# Patient Record
Sex: MALE | Race: WHITE | NOT HISPANIC OR LATINO | Employment: OTHER | URBAN - METROPOLITAN AREA
[De-identification: names, ages, dates, MRNs, and addresses within clinical notes are randomized per-mention and may not be internally consistent; named-entity substitution may affect disease eponyms.]

---

## 2018-11-06 ENCOUNTER — OFFICE VISIT (OUTPATIENT)
Dept: ORTHOPEDIC SURGERY | Facility: CLINIC | Age: 53
End: 2018-11-06

## 2018-11-06 VITALS — HEART RATE: 88 BPM | BODY MASS INDEX: 32.65 KG/M2 | HEIGHT: 69 IN | OXYGEN SATURATION: 98 % | WEIGHT: 220.46 LBS

## 2018-11-06 DIAGNOSIS — G56.31 NEUROPATHY OF RIGHT RADIAL NERVE: Primary | ICD-10-CM

## 2018-11-06 PROCEDURE — 99203 OFFICE O/P NEW LOW 30 MIN: CPT | Performed by: PHYSICIAN ASSISTANT

## 2018-11-06 RX ORDER — TESTOSTERONE CYPIONATE 200 MG/ML
INJECTION, SOLUTION INTRAMUSCULAR
COMMUNITY
Start: 2018-10-31 | End: 2022-04-12 | Stop reason: SDUPTHER

## 2018-11-06 RX ORDER — CLOPIDOGREL BISULFATE 75 MG/1
TABLET ORAL
COMMUNITY
Start: 2018-10-22 | End: 2022-04-12 | Stop reason: SDUPTHER

## 2018-11-06 RX ORDER — LISINOPRIL 20 MG/1
TABLET ORAL
COMMUNITY
Start: 2018-11-01 | End: 2022-05-23

## 2018-11-06 RX ORDER — ISOSORBIDE MONONITRATE 30 MG/1
TABLET, EXTENDED RELEASE ORAL
COMMUNITY
Start: 2018-10-22 | End: 2021-10-22

## 2018-11-06 RX ORDER — NITROGLYCERIN 0.4 MG/1
TABLET SUBLINGUAL
COMMUNITY
Start: 2018-10-23

## 2018-11-06 RX ORDER — GABAPENTIN 600 MG/1
600 TABLET ORAL 4 TIMES DAILY PRN
COMMUNITY
Start: 2018-10-25 | End: 2021-10-22 | Stop reason: SDUPTHER

## 2018-11-06 RX ORDER — LANCETS 33 GAUGE
EACH MISCELLANEOUS
COMMUNITY
Start: 2018-10-24

## 2018-11-06 RX ORDER — ATORVASTATIN CALCIUM 40 MG/1
80 TABLET, FILM COATED ORAL
COMMUNITY
Start: 2018-10-22 | End: 2022-04-12 | Stop reason: SDUPTHER

## 2018-11-06 NOTE — PROGRESS NOTES
[facility] [place] Orthopaedic Surgery Clinic Note    Subjective     Chief Complaint   Patient presents with   • Right Hand - Pain        HPI      Ibrahima Martinez is a 53 y.o. male.  Right-hand-dominant.  Patient presents to orthopedic clinic for evaluation of his right thumb.  He states 3 or 4 months ago he slept with his elbow extended and hanging off the edge of the bed.  After that he had difficulty achieving full range of motion the elbow but after a few days that resolved and then he noticed he was no longer able to extend his thumb.  He reports no history of injury or trauma.  He has no pain, numbness or tingling into the thumb or into any part of the upper extremity.  No reported fever, chills, night sweats or other constitutional symptoms.  Symptoms do affect his job as a  making it difficult for him to do any gripping, twisting or twisting maneuvers.        Past Medical History:   Diagnosis Date   • Diabetes (CMS/HCC)    • Heart disease    • Hypertension       Past Surgical History:   Procedure Laterality Date   • CARDIAC CATHETERIZATION     • CORONARY STENT PLACEMENT        Family History   Problem Relation Age of Onset   • Heart attack Father    • Hypertension Father      Social History     Social History   • Marital status:      Spouse name: N/A   • Number of children: N/A   • Years of education: N/A     Occupational History   • Not on file.     Social History Main Topics   • Smoking status: Current Every Day Smoker     Packs/day: 1.00     Types: Cigarettes   • Smokeless tobacco: Never Used   • Alcohol use No   • Drug use: No   • Sexual activity: Defer     Other Topics Concern   • Not on file     Social History Narrative   • No narrative on file      No current outpatient prescriptions on file prior to visit.     No current facility-administered medications on file prior to visit.       No Known Allergies     The following portions of the patient's history were reviewed and updated as appropriate:  "allergies, current medications, past family history, past medical history, past social history, past surgical history and problem list.    Review of Systems   Constitutional: Negative.    HENT: Negative.    Eyes: Negative.    Respiratory: Negative.    Cardiovascular: Negative.    Gastrointestinal: Negative.    Endocrine: Negative.    Genitourinary: Negative.    Musculoskeletal: Positive for arthralgias.   Skin: Negative.    Allergic/Immunologic: Negative.    Neurological: Negative.    Hematological: Negative.    Psychiatric/Behavioral: Negative.         Objective      Physical Exam  Pulse 88   Ht 174 cm (68.5\")   Wt 100 kg (220 lb 7.4 oz)   SpO2 98%   BMI 33.03 kg/m²     Body mass index is 33.03 kg/m².      GENERAL APPEARANCE: awake, alert & oriented x 3, in no acute distress and well developed, well nourished  PSYCH: normal mood and affect  LUNGS:  breathing nonlabored, no wheezing  EYES: sclera anicteric, pupils equal  CARDIOVASCULAR: palpable pulses dorsalis pedis, palpable posterior tibial bilaterally. Capillary refill less than 2 seconds  INTEGUMENTARY: skin intact, no clubbing, cyanosis  NEUROLOGIC:  Normal Sensation and reflexes           Ortho Exam  Peripheral Vascular   Bilateral Upper Extremity    No cyanotic nail beds    Pink nail beds and rapid capillary refill   Palpation    Radial Pulse - Bilaterally normal    Neurologic   Sensory: Light touch intact- Right and left hand    Left Upper Extremity    Left wrist extensors: 5/5    Left wrist flexors: 5/5    Left intrinsics: 5/5   Right Upper Extremity    Right wrist extensors: 4+/5    Right wrist flexors: 4+/5    Right intrinsics: 5/5 with exception of 0/5 EPB and EPL    Musculoskeletal   Left Elbow    Forearm supination: AROM - 90 degrees    Forearm pronation: AROM - 90 degrees   Right Elbow    Forearm supination: AROM - 90 degrees    Forearm pronation: AROM - 90 degrees     Inspection and Palpation   Right Wrist      Tenderness - none    Swelling - " none    Crepitus - none    Muscle tone - no atrophy   Left Wrist    Tenderness - none    Swelling - none    Crepitus - none    Muscle tone - no atrophy     ROJM:   Left Wrist    Flexion: AROM - 90 degrees    Extension: AROM - 90 degrees   Right Wrist    Flexion: AROM - 90 degrees    Extension: AROM - 90 degrees     Deformities, Malalignments, Discrepancies    None     Functional Testing   Right Wrist    Tinel's Sign negative    Phalen's Sign negative    Carpal Compression Test negative   Left Wrist    Tinel's Sign negative    Phalen's Sign negative    Carpal Compression Test negative       Strength and Tone    Right  strength: fair because of EPB and EPL not functioning    Left  strength: good      Imaging/Studies  Imaging Results (last 7 days)     Procedure Component Value Units Date/Time    XR Hand 3+ View Right [[id number]] Updated:  11/06/18 1416      Ordered plain film imaging of the right hand.  Images reviewed by Dr. Hernandez.  No acute bony abnormality, fracture or dislocation.  Joint spaces are preserved.  No abnormal soft tissue findings.  See chart for official report.    Assessment/Plan        ICD-10-CM ICD-9-CM   1. Neuropathy of right radial nerve G56.31 354.3       Orders Placed This Encounter   Procedures   • XR Hand 3+ View Right   • EMG & Nerve Conduction Test        -Discussion was had with patient regarding exam, imaging, assessment and treatment options.  -Based on exam today I thought it would be prudent proceed on with EMG/NCS.  -If that study is negative for radiculopathy, neuropathy/palsy recommend proceeding on with MRI.  -If it any time he notes any pain recommend over-the-counter pain medication as needed.  -At this time patient will follow-up after EMG/NCS completed.  -Question and concerns answered.    Medical Decision Making  Management Options : over-the-counter medicine  Data/Risk: radiology tests    Shirley Dick PA-C  11/06/18  3:05 PM         EMR  Dragon/Transcription disclaimer:  Much of this encounter note is an electronic transcription of spoken language to printed text. Electronic transcription of spoken language may permit erroneous, or at times, nonsensical words or phrases to be inadvertently transcribed. Although I have reviewed the note for such errors, some may still exist.

## 2018-11-20 ENCOUNTER — OFFICE VISIT (OUTPATIENT)
Dept: ORTHOPEDIC SURGERY | Facility: CLINIC | Age: 53
End: 2018-11-20

## 2018-11-20 VITALS — WEIGHT: 216.05 LBS | HEART RATE: 90 BPM | BODY MASS INDEX: 32 KG/M2 | HEIGHT: 69 IN | OXYGEN SATURATION: 99 %

## 2018-11-20 DIAGNOSIS — G56.31 RADIAL NERVE PALSY, RIGHT: Primary | ICD-10-CM

## 2018-11-20 DIAGNOSIS — G56.11 MEDIAN NERVE NEUROPATHY, RIGHT: ICD-10-CM

## 2018-11-20 PROCEDURE — 99213 OFFICE O/P EST LOW 20 MIN: CPT | Performed by: PHYSICIAN ASSISTANT

## 2018-11-20 RX ORDER — SITAGLIPTIN 50 MG/1
TABLET, FILM COATED ORAL
COMMUNITY
Start: 2018-11-15 | End: 2021-10-22

## 2018-11-20 NOTE — PROGRESS NOTES
"    [facility] [place] Orthopaedic Surgery Clinic Note    Subjective     CC: Follow-up (2 week f/u Neuropathy of right radial nerve after EMG)      CARMELO Martinez is a 53 y.o. male.  Patient returns for follow-up of his right thumb following EMG/NCS.  Patient reports no change in that he still cannot extend his right thumb.  He does have a little bit of improvement with a reduction.  Once again no history of injury or trauma that he can recall.  He denies pain numbness or tingling into the extremity.     He states that following the EMG/NCS testing that provider has artery referred him on to an orthopedic surgeon at , he does not recall his name but states he was told the provider does upper extremity surgeries.    ROS:    Constiutional:Pt denies fever, chills, nausea, or vomiting.  MSK:as above    Objective      Past Medical History  Past Medical History:   Diagnosis Date   • Diabetes (CMS/HCC)    • Heart disease    • Hypertension          Physical Exam  Pulse 90   Ht 174 cm (68.5\")   Wt 98 kg (216 lb 0.8 oz)   SpO2 99%   BMI 32.37 kg/m²     Body mass index is 32.37 kg/m².    Patient is well nourished and well developed.        Ortho Exam  Peripheral Vascular              Bilateral Upper Extremity                          No cyanotic nail beds                          Pink nail beds and rapid capillary refill              Palpation                          Radial Pulse - Bilaterally normal     Neurologic              Sensory: Light touch intact- Right and left hand                  Right Upper Extremity                          Right wrist extensors: 4+/5                          Right wrist flexors: 4+/5                          Right intrinsics: 5/5 with exception of 0/5 EPB and EPL, 3-/5 APB     Musculoskeletal              Left Elbow                          Forearm supination: AROM - 90 degrees                          Forearm pronation: AROM - 90 degrees              Right Elbow                          Forearm " supination: AROM - 90 degrees                          Forearm pronation: AROM - 90 degrees                 Inspection and Palpation              Right Wrist                               Tenderness - none                          Swelling - none                          Crepitus - none                          Muscle tone - positive thenar atrophy                ROJM:              Left Wrist                          Flexion: AROM - 90 degrees                          Extension: AROM - 90 degrees              Right Wrist                          Flexion: AROM - 90 degrees                          Extension: AROM - 90 degrees                 Deformities, Malalignments, Discrepancies                          None                 Functional Testing              Right Wrist                          Tinel's Sign negative                          Phalen's Sign negative                          Carpal Compression Test negative              Left Wrist                          Tinel's Sign negative                          Phalen's Sign negative                          Carpal Compression Test negative                            Strength and Tone                          Right  strength: weak because of EPB and EPL not functioning                          Left  strength: good    Imaging/Labs/EMG Reviewed:  Imaging Results (last 24 hours)     ** No results found for the last 24 hours. **      EMG/NCS performed at [facility] on 11/15/18 was reviewed.  Per report evidence of proximal right median neuropathy with neurogenic changes noted to APB and pronator teres.  According to the report radial and ulnar motor studies were normal and median, ulnar, radial sensory studies were normal.    Assessment:  1. Radial nerve palsy, right    2. Median nerve neuropathy, right        Plan:  1. Secondary to the findings of the exam showing radial nerve palsy and EMG/NCS study showing evidence of median nerve  neuropathy I recommended patient keeps his appointment with  for further evaluation and definitive treatment as indicated.  2. He may require an MRI but at this time we will have the provider at  to order the studies that they deem appropriate.  3. He'll follow-up with us on an as-needed basis.  4. Question and concerns answered.        Shirley Dick PA-C  11/20/18  11:44 AM

## 2018-12-07 ENCOUNTER — TRANSCRIBE ORDERS (OUTPATIENT)
Dept: ADMINISTRATIVE | Facility: HOSPITAL | Age: 53
End: 2018-12-07

## 2018-12-07 DIAGNOSIS — G56.10: Primary | ICD-10-CM

## 2018-12-11 ENCOUNTER — TRANSCRIBE ORDERS (OUTPATIENT)
Dept: ADMINISTRATIVE | Facility: HOSPITAL | Age: 53
End: 2018-12-11

## 2018-12-11 DIAGNOSIS — G56.10: Primary | ICD-10-CM

## 2018-12-18 ENCOUNTER — HOSPITAL ENCOUNTER (OUTPATIENT)
Dept: MRI IMAGING | Facility: HOSPITAL | Age: 53
Discharge: HOME | End: 2018-12-18
Attending: PSYCHIATRY & NEUROLOGY | Admitting: PSYCHIATRY & NEUROLOGY

## 2018-12-18 ENCOUNTER — HOSPITAL ENCOUNTER (OUTPATIENT)
Dept: GENERAL RADIOLOGY | Facility: HOSPITAL | Age: 53
Discharge: HOME | End: 2018-12-18

## 2018-12-18 DIAGNOSIS — G56.10: ICD-10-CM

## 2018-12-18 DIAGNOSIS — Z13.89 ENCOUNTER FOR IMAGING TO SCREEN FOR METAL PRIOR TO MAGNETIC RESONANCE IMAGING (MRI): ICD-10-CM

## 2018-12-18 PROCEDURE — 73221 MRI JOINT UPR EXTREM W/O DYE: CPT

## 2018-12-18 PROCEDURE — 70200 X-RAY EXAM OF EYE SOCKETS: CPT

## 2021-10-22 ENCOUNTER — OFFICE VISIT (OUTPATIENT)
Dept: NEUROSURGERY | Facility: CLINIC | Age: 56
End: 2021-10-22

## 2021-10-22 VITALS — HEIGHT: 69 IN | TEMPERATURE: 97.3 F | BODY MASS INDEX: 33.62 KG/M2 | WEIGHT: 227 LBS

## 2021-10-22 DIAGNOSIS — M54.16 LUMBAR RADICULOPATHY: Primary | ICD-10-CM

## 2021-10-22 DIAGNOSIS — M51.36 DDD (DEGENERATIVE DISC DISEASE), LUMBAR: ICD-10-CM

## 2021-10-22 PROCEDURE — 99204 OFFICE O/P NEW MOD 45 MIN: CPT | Performed by: NEUROLOGICAL SURGERY

## 2021-10-22 RX ORDER — GABAPENTIN 600 MG/1
600 TABLET ORAL 4 TIMES DAILY
Qty: 120 TABLET | Refills: 0 | Status: SHIPPED | OUTPATIENT
Start: 2021-10-22 | End: 2021-11-24 | Stop reason: SDUPTHER

## 2021-10-22 RX ORDER — METOPROLOL SUCCINATE 25 MG/1
25 TABLET, EXTENDED RELEASE ORAL DAILY
COMMUNITY
Start: 2021-07-28 | End: 2022-04-12 | Stop reason: SDUPTHER

## 2021-10-22 NOTE — PROGRESS NOTES
Patient: Ibrahima Martinez  : 1965    Primary Care Provider: Michael Ruiz MD    Requesting Provider: As above        History    Chief Complaint: Low back and right lower extremity pain.    History of Present Illness: Mr. Martinez is a 56-year-old unemployed  who since March of this year has had bothersome low back pain that extends into the right posterior lateral calf.  He has no left leg symptoms.  He denies any inciting or precipitating events.  He has done chiropractic.  He had some leftover gabapentin which he is taken in recent days and that has been somewhat helpful.  He had an epidural injection which helped for approximately 1.5 days.  He has no bowel or bladder dysfunction.  His symptoms are worse with virtually all activity including lying down.  He saw orthopedic spine doctors in [place] in the spring of this year.    Review of Systems   Constitutional: Negative for activity change, appetite change, chills, diaphoresis, fatigue, fever and unexpected weight change.   HENT: Positive for dental problem. Negative for congestion, drooling, ear discharge, ear pain, facial swelling, hearing loss, mouth sores, nosebleeds, postnasal drip, rhinorrhea, sinus pressure, sinus pain, sneezing, sore throat, tinnitus, trouble swallowing and voice change.    Eyes: Negative for photophobia, pain, discharge, redness, itching and visual disturbance.   Respiratory: Positive for wheezing. Negative for apnea, cough, choking, chest tightness, shortness of breath and stridor.    Cardiovascular: Negative for chest pain, palpitations and leg swelling.   Gastrointestinal: Negative for abdominal distention, abdominal pain, anal bleeding, blood in stool, constipation, diarrhea, nausea, rectal pain and vomiting.   Endocrine: Negative for cold intolerance, heat intolerance, polydipsia, polyphagia and polyuria.   Genitourinary: Negative for decreased urine volume, difficulty urinating, dysuria, enuresis, flank pain,  "frequency, genital sores, hematuria and urgency.   Musculoskeletal: Negative for arthralgias, back pain, gait problem, joint swelling, myalgias, neck pain and neck stiffness.   Skin: Negative for color change, pallor, rash and wound.   Allergic/Immunologic: Negative for environmental allergies, food allergies and immunocompromised state.   Neurological: Negative for dizziness, tremors, seizures, syncope, facial asymmetry, speech difficulty, weakness, light-headedness, numbness and headaches.   Hematological: Negative for adenopathy. Does not bruise/bleed easily.   Psychiatric/Behavioral: Negative for agitation, behavioral problems, confusion, decreased concentration, dysphoric mood, hallucinations, self-injury, sleep disturbance and suicidal ideas. The patient is not nervous/anxious and is not hyperactive.    All other systems reviewed and are negative.      The patient's past medical history, past surgical history, family history, and social history have been reviewed at length in the electronic medical record.    Physical Exam:   Temp 97.3 °F (36.3 °C)   Ht 174 cm (68.5\")   Wt 103 kg (227 lb)   BMI 34.01 kg/m²   CONSTITUTIONAL: Patient is well-nourished, pleasant and appears stated age.  CV: Heart regular rate and rhythm without murmur, rub, or gallop.  PULMONARY: Lungs are clear to ascultation.  MUSCULOSKELETAL:  Straight leg raising is negative.  Aydin's Sign is negative.  ROM in the low back is normal.  Tenderness in the back to palpation is not observed.  NEUROLOGICAL:  Orientation, memory, attention span, language function, and cognition have been examined and are intact.  Strength is intact in the lower extremities to direct testing.  Muscle tone is normal throughout.  Station and gait are normal.  Sensation is intact to light touch testing throughout.  Deep tendon reflexes are 2+ and symmetrical except the right ankle reflex which is absent.  Coordination is intact.      Medical Decision Making    Data " Review:   (All imaging studies were personally reviewed unless stated otherwise)  The patient did not bring his imaging studies.  The report for a lumbar MRI study dated 4/7/2021 suggested moderate spinal stenosis at L4-5 and a left paracentral disc extrusion at L5-S1 in addition to diffuse degenerative change.    Diagnosis:   Right lower extremity radiculopathy.    Treatment Options:   I have ordered his gabapentin, 600 mg 4 times a day.  He will follow-up with his MRI images for my review.  Further recommendations will then ensue.  He is not vaccinated and does not want to be a guinea pig.       Diagnosis Plan   1. Lumbar radiculopathy     2. DDD (degenerative disc disease), lumbar         Scribed for Peter Mireles MD by KATIE Whitten 10/22/2021 10:34 EDT      I, Dr. Mireles, personally performed the services described in the documentation, as scribed in my presence, and it is both accurate and complete.

## 2021-11-03 ENCOUNTER — OFFICE VISIT (OUTPATIENT)
Dept: NEUROSURGERY | Facility: CLINIC | Age: 56
End: 2021-11-03

## 2021-11-03 VITALS — TEMPERATURE: 96.9 F | WEIGHT: 232 LBS | BODY MASS INDEX: 34.36 KG/M2 | HEIGHT: 69 IN

## 2021-11-03 DIAGNOSIS — M51.36 DDD (DEGENERATIVE DISC DISEASE), LUMBAR: ICD-10-CM

## 2021-11-03 DIAGNOSIS — M51.16 LUMBAR DISC HERNIATION WITH RADICULOPATHY: Primary | ICD-10-CM

## 2021-11-03 PROCEDURE — 99213 OFFICE O/P EST LOW 20 MIN: CPT | Performed by: NEUROLOGICAL SURGERY

## 2021-11-03 NOTE — PROGRESS NOTES
Patient: Ibrahima Martinez  : 1965    Primary Care Provider: Michael Ruiz MD    Requesting Provider: As above        History    Chief Complaint: Low back and right lower extremity pain.    History of Present Illness: Mr. Martinez is a 56-year-old unemployed  who since March of this year has had bothersome low back pain that extends into the right posterior lateral calf.  He has no left leg symptoms.  He denies any inciting or precipitating events.  He has done chiropractic.  He had some leftover gabapentin which he is taken in recent days and that has been somewhat helpful.  He had an epidural injection which helped for approximately 1.5 days.  He has no bowel or bladder dysfunction.  His symptoms are worse with virtually all activity including lying down.  He saw orthopedic spine doctors in [place] in the spring of this year.  He has been taking gabapentin since his last visit and his symptoms are much improved.    Review of Systems   Constitutional: Negative for activity change, appetite change, chills, diaphoresis, fatigue, fever and unexpected weight change.   HENT: Positive for dental problem. Negative for congestion, drooling, ear discharge, ear pain, facial swelling, hearing loss, mouth sores, nosebleeds, postnasal drip, rhinorrhea, sinus pressure, sinus pain, sneezing, sore throat, tinnitus, trouble swallowing and voice change.    Eyes: Negative for photophobia, pain, discharge, redness, itching and visual disturbance.   Respiratory: Positive for wheezing. Negative for apnea, cough, choking, chest tightness, shortness of breath and stridor.    Cardiovascular: Negative for chest pain, palpitations and leg swelling.   Gastrointestinal: Negative for abdominal distention, abdominal pain, anal bleeding, blood in stool, constipation, diarrhea, nausea, rectal pain and vomiting.   Endocrine: Negative for cold intolerance, heat intolerance, polydipsia, polyphagia and polyuria.   Genitourinary: Negative  "for decreased urine volume, difficulty urinating, dysuria, enuresis, flank pain, frequency, genital sores, hematuria, penile discharge, penile pain, penile swelling, scrotal swelling, testicular pain and urgency.   Musculoskeletal: Negative for arthralgias, back pain, gait problem, joint swelling, myalgias, neck pain and neck stiffness.   Skin: Negative for color change, pallor, rash and wound.   Allergic/Immunologic: Negative for environmental allergies, food allergies and immunocompromised state.   Neurological: Negative for dizziness, tremors, seizures, syncope, facial asymmetry, speech difficulty, weakness, light-headedness, numbness and headaches.   Hematological: Negative for adenopathy. Does not bruise/bleed easily.   Psychiatric/Behavioral: Negative for agitation, behavioral problems, confusion, decreased concentration, dysphoric mood, hallucinations, self-injury, sleep disturbance and suicidal ideas. The patient is not nervous/anxious and is not hyperactive.    All other systems reviewed and are negative.      The patient's past medical history, past surgical history, family history, and social history have been reviewed at length in the electronic medical record.    Physical Exam:   Temp 96.9 °F (36.1 °C)   Ht 174 cm (68.5\")   Wt 105 kg (232 lb)   BMI 34.76 kg/m²   MUSCULOSKELETAL:  Straight leg raising is negative.  Aydin's Sign is negative.  Tenderness in the back to palpation is not observed.  NEUROLOGICAL:  Strength is intact in the lower extremities to direct testing.  Muscle tone is normal throughout.  Station and gait are normal.  Sensation is intact to light touch testing throughout.    Medical Decision Making    Data Review:   (All imaging studies were personally reviewed unless stated otherwise)  MRI of the lumbar spine dated 4/7/2021 demonstrates some diffuse degenerative disc disease and facet arthropathy.  There is central disc protrusion slightly more prominent rightward at L4-5 and " L5-S1.  There is ligamentous overgrowth and possibly a small synovial cyst on the right at L4-5 that narrows the recess.    Diagnosis:   1.  Lumbar radiculopathy probably emanating from the L4-5 and/or L5-S1 levels.  2.  Lumbar degenerative disc disease.  3.  Lumbar degenerative joint disease.    Treatment Options:   The patient will continue his gabapentin.  I would probably do that for several more months and then try and wean down on the medicine.  He will follow-up with his primary care provider.  If his symptoms progress then I will be happy to reevaluate him.  Should he require surgical intervention I would probably check some flexion-extension upright lateral lumbar spine x-rays prior to intervention to rule out instability.       Diagnosis Plan   1. Lumbar disc herniation with radiculopathy     2. DDD (degenerative disc disease), lumbar         Scribed for Peter Mireles MD by Soraya Andersen [facility] 11/3/2021 09:03 EDT      I, Dr. Mireles, personally performed the services described in the documentation, as scribed in my presence, and it is both accurate and complete.

## 2021-11-24 ENCOUNTER — TELEPHONE (OUTPATIENT)
Dept: NEUROSURGERY | Facility: CLINIC | Age: 56
End: 2021-11-24

## 2021-11-24 DIAGNOSIS — M54.16 LUMBAR RADICULOPATHY: ICD-10-CM

## 2021-11-24 RX ORDER — GABAPENTIN 600 MG/1
600 TABLET ORAL 4 TIMES DAILY
Qty: 120 TABLET | Refills: 0 | Status: SHIPPED | OUTPATIENT
Start: 2021-11-24 | End: 2021-12-20 | Stop reason: SDUPTHER

## 2021-11-24 NOTE — TELEPHONE ENCOUNTER
Caller: Ibrahima Martinez    Relationship: Self    Best call back number: [phone]  Requested Prescriptions:   Requested Prescriptions     Pending Prescriptions Disp Refills   • gabapentin (NEURONTIN) 600 MG tablet 120 tablet 0     Sig: Take 1 tablet by mouth 4 (Four) Times a Day.        Pharmacy where request should be sent:  [facility]    Additional details provided by patient: PATIENT STATES A PRESCRIPTION WAS NEVER RECEIVED     Does the patient have less than a 3 day supply:  [x] Yes  [] No    King AL Rep   11/24/21 11:59 EST     SENDING HIGH PRIORITY DUE TO PATIENT NOT HAVING ANY LEFT.    THANK YOU

## 2021-11-24 NOTE — TELEPHONE ENCOUNTER
Provider:  Araceli  Caller: Ibrahima  Time of call:   11:58a ([facility])  Phone #:  [phone]  Surgery:  NA  Last visit:   Office Visit with Peter Mireles MD (11/03/2021)  Next visit: LILIANA WHITMAN:       10/22/2021 Gabapentin 600MG 1965 120 30 JOSU[facility] LIV[facility]STON [facility] Pharmacist  Group  [place] [id number]    Reason for call:       Requested Prescriptions     Pending Prescriptions Disp Refills   • gabapentin (NEURONTIN) 600 MG tablet 120 tablet 0     Sig: Take 1 tablet by mouth 4 (Four) Times a Day.

## 2021-12-20 DIAGNOSIS — M54.16 LUMBAR RADICULOPATHY: ICD-10-CM

## 2021-12-20 RX ORDER — GABAPENTIN 600 MG/1
600 TABLET ORAL 4 TIMES DAILY
Qty: 120 TABLET | Refills: 1 | Status: SHIPPED | OUTPATIENT
Start: 2021-12-20 | End: 2022-03-02

## 2021-12-20 NOTE — TELEPHONE ENCOUNTER
Caller: Ibrahima Martinez    Relationship: Self    Best call back number:[phone]      Requested Prescriptions: gabapentin (NEURONTIN) 600 MG tablet       Pharmacy where request should be sent:[facility]    Additional details provided by patient:PT WAS ASKING IF HE NEEDS TO CALL EVERY MONTH TO HAVE THIS REFILLED OR CAN HE BE ON AUTOMATIC REFILL-PLEASE ADVISE THANK YOU    Does the patient have less than a 3 day supply:  [x] Yes  [] No    King Devi Rep   12/20/21 14:22 EST

## 2021-12-20 NOTE — TELEPHONE ENCOUNTER
Provider:  Chi  Caller:  Patient  Surgery:  NA  Surgery Date: NA   Last visit: Office Visit with Peter Mireles MD (11/03/2021)   Next visit: NA    Reason for call:      Please see encounter from Sabrina.   Gabapentin pending, please sign if appropriate.         Requested Prescriptions     Pending Prescriptions Disp Refills   • gabapentin (NEURONTIN) 600 MG tablet 120 tablet 0     Sig: Take 1 tablet by mouth 4 (Four) Times a Day.     ANTONETTE:    10/22/2021 Gabapentin 600MG 1965 120 30 [facility] Pharmacist  Group  [facility] 1  11/24/2021 Gabapentin 600MG 1965 120 30 LUDA VILLARREAL [facility] Pharmacist  Group  [facility] 1

## 2022-03-01 DIAGNOSIS — M54.16 LUMBAR RADICULOPATHY: ICD-10-CM

## 2022-03-01 NOTE — TELEPHONE ENCOUNTER
Provider:  Chi  Caller:  Automated refill request  Surgery:  NA  Surgery Date: NA    Last visit:  Office Visit with Peter Mireles MD (11/03/2021)  Next visit: NA    Reason for call:        Automated refill request for Gabapentin.      Requested Prescriptions     Pending Prescriptions Disp Refills   • gabapentin (NEURONTIN) 600 MG tablet [Pharmacy Med Name: GABAPENTIN 600MG] 120 tablet 0     Sig: TAKE 1 TABLET BY MOUTH FOUR TIMES DAILY     ANTONETTE:      12/22/2021 Gabapentin 600MG 1965 120 30 Elma Ferreira  PHARMACIST GROUP  [facility] 1  01/21/2022 Gabapentin 600MG 1965 120 30 Elma Ferreira [facility]  PHARMACIST GROUP  [facility] 1  01/31/2022 Testosterone Cypionate  200MG/ML/9.45MG/ML/0.2ML/  1965 10 70 Michael Ruiz  PHARMACIST GROUP  [facility] 1

## 2022-03-01 NOTE — TELEPHONE ENCOUNTER
We'd like patient to start weaning off of this. If he does not think he can do that then we would ask he see his PCP to continue management as he is no longer followed in our office. We can give him a couple refills until he can get into his PCP if needed

## 2022-03-02 RX ORDER — GABAPENTIN 600 MG/1
TABLET ORAL
Qty: 120 TABLET | Refills: 0 | Status: SHIPPED | OUTPATIENT
Start: 2022-03-02 | End: 2022-04-12 | Stop reason: SDUPTHER

## 2022-04-12 ENCOUNTER — OFFICE VISIT (OUTPATIENT)
Dept: FAMILY MEDICINE CLINIC | Facility: CLINIC | Age: 57
End: 2022-04-12

## 2022-04-12 VITALS
HEIGHT: 70 IN | DIASTOLIC BLOOD PRESSURE: 90 MMHG | OXYGEN SATURATION: 94 % | BODY MASS INDEX: 32.21 KG/M2 | SYSTOLIC BLOOD PRESSURE: 156 MMHG | WEIGHT: 225 LBS | HEART RATE: 86 BPM

## 2022-04-12 DIAGNOSIS — M54.31 NEURALGIA OF RIGHT SCIATIC NERVE: ICD-10-CM

## 2022-04-12 DIAGNOSIS — I10 PRIMARY HYPERTENSION: ICD-10-CM

## 2022-04-12 DIAGNOSIS — Z79.899 ENCOUNTER FOR LONG-TERM (CURRENT) USE OF OTHER MEDICATIONS: ICD-10-CM

## 2022-04-12 DIAGNOSIS — K08.9 POOR DENTITION: ICD-10-CM

## 2022-04-12 DIAGNOSIS — M54.16 LUMBAR RADICULOPATHY: ICD-10-CM

## 2022-04-12 DIAGNOSIS — I25.10 ATHEROSCLEROSIS OF CORONARY ARTERY OF NATIVE HEART WITHOUT ANGINA PECTORIS, UNSPECIFIED VESSEL OR LESION TYPE: Primary | ICD-10-CM

## 2022-04-12 DIAGNOSIS — M51.37 DEGENERATION OF LUMBOSACRAL INTERVERTEBRAL DISC: ICD-10-CM

## 2022-04-12 DIAGNOSIS — J43.9 PULMONARY EMPHYSEMA, UNSPECIFIED EMPHYSEMA TYPE: ICD-10-CM

## 2022-04-12 DIAGNOSIS — N18.9 CHRONIC KIDNEY DISEASE, UNSPECIFIED CKD STAGE: ICD-10-CM

## 2022-04-12 DIAGNOSIS — E29.1 MALE HYPOGONADISM: ICD-10-CM

## 2022-04-12 PROBLEM — G62.9 PERIPHERAL NEUROPATHY: Status: ACTIVE | Noted: 2022-03-23

## 2022-04-12 PROBLEM — K21.9 GASTROESOPHAGEAL REFLUX DISEASE: Status: ACTIVE | Noted: 2022-03-28

## 2022-04-12 PROBLEM — F11.90 OPIOID USE DISORDER: Status: ACTIVE | Noted: 2022-03-23

## 2022-04-12 PROBLEM — J44.9 CHRONIC OBSTRUCTIVE PULMONARY DISEASE (HCC): Status: ACTIVE | Noted: 2022-03-28

## 2022-04-12 PROBLEM — M51.379 DEGENERATION OF LUMBOSACRAL INTERVERTEBRAL DISC: Status: ACTIVE | Noted: 2022-04-12

## 2022-04-12 LAB
POC AMPHETAMINES: NEGATIVE
POC BARBITURATES: NEGATIVE
POC BENZODIAZEPHINES: NEGATIVE
POC COCAINE: NEGATIVE
POC METHADONE: POSITIVE
POC METHAMPHETAMINE SCREEN URINE: NEGATIVE
POC OPIATES: NEGATIVE
POC OXYCODONE: POSITIVE
POC PHENCYCLIDINE: NEGATIVE
POC PROPOXYPHENE: NEGATIVE
POC THC: NEGATIVE
POC TRICYCLIC ANTIDEPRESSANTS: NEGATIVE

## 2022-04-12 PROCEDURE — 80305 DRUG TEST PRSMV DIR OPT OBS: CPT | Performed by: FAMILY MEDICINE

## 2022-04-12 PROCEDURE — 99204 OFFICE O/P NEW MOD 45 MIN: CPT | Performed by: FAMILY MEDICINE

## 2022-04-12 RX ORDER — ATORVASTATIN CALCIUM 80 MG/1
80 TABLET, FILM COATED ORAL DAILY
COMMUNITY
End: 2022-05-23 | Stop reason: SDUPTHER

## 2022-04-12 RX ORDER — FUROSEMIDE 40 MG/1
TABLET ORAL
COMMUNITY
Start: 2022-04-03 | End: 2022-05-23

## 2022-04-12 RX ORDER — CLOPIDOGREL BISULFATE 75 MG/1
75 TABLET ORAL DAILY
Qty: 30 TABLET | Refills: 3 | Status: SHIPPED | OUTPATIENT
Start: 2022-04-12 | End: 2022-05-23 | Stop reason: SDUPTHER

## 2022-04-12 RX ORDER — GABAPENTIN 600 MG/1
600 TABLET ORAL 4 TIMES DAILY
Qty: 120 TABLET | Refills: 5 | Status: SHIPPED | OUTPATIENT
Start: 2022-04-12 | End: 2023-03-27 | Stop reason: SDUPTHER

## 2022-04-12 RX ORDER — TESTOSTERONE CYPIONATE 200 MG/ML
200 INJECTION, SOLUTION INTRAMUSCULAR
Qty: 2 ML | Refills: 2 | Status: SHIPPED | OUTPATIENT
Start: 2022-04-12 | End: 2022-05-23 | Stop reason: SDUPTHER

## 2022-04-12 RX ORDER — METHOCARBAMOL 500 MG/1
500 TABLET, FILM COATED ORAL 4 TIMES DAILY
COMMUNITY
Start: 2022-04-03 | End: 2022-04-12

## 2022-04-12 RX ORDER — OXYCODONE HYDROCHLORIDE 5 MG/1
TABLET ORAL
COMMUNITY
Start: 2022-04-03 | End: 2022-05-23

## 2022-04-12 RX ORDER — ATORVASTATIN CALCIUM 40 MG/1
80 TABLET, FILM COATED ORAL DAILY
Qty: 30 TABLET | Refills: 3 | Status: SHIPPED | OUTPATIENT
Start: 2022-04-12 | End: 2022-04-12

## 2022-04-12 RX ORDER — DOCUSATE SODIUM 100 MG/1
TABLET ORAL
COMMUNITY
Start: 2022-04-03

## 2022-04-12 RX ORDER — PSEUDOEPHED/ACETAMINOPH/DIPHEN 30MG-500MG
TABLET ORAL
COMMUNITY
Start: 2022-04-03

## 2022-04-12 NOTE — PROGRESS NOTES
New Patient Office Visit      Date of Visit:  2022   Patient Name: Ibrahima Martinez  : 1965   MRN: [id number]     Chief Complaint:  No chief complaint on file.      History of Present Illness: Ibrahima Martinez is a 56 y.o. male who is here today to establish care.  Patient comes in today for multiple reasons.  Somewhat of a hospital follow-up.  Patient with recent CABG.  Surgery done at .  Normal cardiologist is in [place].  Patient has multiple problems.  He has a history of hypertension, chronic kidney disease, chronic low back pain, COPD.  He also has a history of low testosterone.  Medications and medical problems reviewed.  Patient currently under the care of cardiology for his coronary artery disease.  Currently sees addiction management for his methadone refills.  Started on gabapentin by neurosurgery.  Did also need a refill on this medication.  Tico and UDS reviewed.  Symptoms controlled with current medication.  HPI      Subjective      Review of Systems:   Review of Systems   Constitutional: Negative for fatigue and fever.   HENT: Negative for congestion and ear pain.    Respiratory: Negative for apnea, cough, chest tightness and shortness of breath.    Cardiovascular: Negative for chest pain.   Gastrointestinal: Negative for abdominal pain, constipation, diarrhea and nausea.   Musculoskeletal: Negative for arthralgias.   Psychiatric/Behavioral: Negative for depressed mood and stress.       Past Medical History:   Past Medical History:   Diagnosis Date   • Back problem    • Diabetes (HCC)    • Heart disease    • Hypertension        Past Surgical History:   Past Surgical History:   Procedure Laterality Date   • CARDIAC CATHETERIZATION     • CORONARY STENT PLACEMENT     • EPIDURAL BLOCK      For right-sided sciatica pain.        Family History:   Family History   Problem Relation Age of Onset   • Heart attack Father    • Hypertension Father    • Heart disease Father    • Arthritis Mother         Social History:   Social History     Socioeconomic History   • Marital status:    Tobacco Use   • Smoking status: Former Smoker     Packs/day: 1.00     Years: 15.00     Pack years: 15.00     Types: Cigarettes     Quit date: 2022     Years since quittin.0   • Smokeless tobacco: Never Used   Vaping Use   • Vaping Use: Never used   Substance and Sexual Activity   • Alcohol use: No   • Drug use: No   • Sexual activity: Defer       Medications:     Current Outpatient Medications:   •  aspirin 81 MG tablet, Take  by mouth Daily., Disp: , Rfl:   •  atorvastatin (LIPITOR) 80 MG tablet, Take 80 mg by mouth Daily., Disp: , Rfl:   •  clopidogrel (PLAVIX) 75 MG tablet, Take 1 tablet by mouth Daily., Disp: 30 tablet, Rfl: 3  •  furosemide (LASIX) 40 MG tablet, , Disp: , Rfl:   •  gabapentin (NEURONTIN) 600 MG tablet, Take 1 tablet by mouth 4 (Four) Times a Day., Disp: 120 tablet, Rfl: 5  •  METHADONE HCL PO, Take 100 mg by mouth Every Morning., Disp: , Rfl:   •  mometasone-formoterol (DULERA 100) 100-5 MCG/ACT inhaler, Inhale 2 puffs 2 (Two) Times a Day., Disp: , Rfl:   •  nitroglycerin (NITROSTAT) 0.4 MG SL tablet, , Disp: , Rfl:   •  ONETOUCH DELICA LANCETS 33G misc, , Disp: , Rfl:   •  oxyCODONE (ROXICODONE) 5 MG immediate release tablet, , Disp: , Rfl:   •  Stool Softener 100 MG capsule, , Disp: , Rfl:   •  Testosterone Cypionate (DEPOTESTOTERONE CYPIONATE) 200 MG/ML injection, Inject 1 mL into the appropriate muscle as directed by prescriber Every 14 (Fourteen) Days., Disp: 2 mL, Rfl: 2  •  tiotropium bromide monohydrate (SPIRIVA RESPIMAT) 2.5 MCG/ACT aerosol solution inhaler, Inhale 2 puffs Daily., Disp: 1 each, Rfl: 5  •  Acetaminophen Extra Strength 500 MG tablet, , Disp: , Rfl:   •  lisinopril (PRINIVIL,ZESTRIL) 20 MG tablet, , Disp: , Rfl:   •  metFORMIN (GLUCOPHAGE) 1000 MG tablet, Take 1,000 mg by mouth 2 (Two) Times a Day With Meals., Disp: , Rfl:   •  metoprolol tartrate (LOPRESSOR) 25 MG  "tablet, Take 1.5 tablets by mouth 2 (Two) Times a Day., Disp: 90 tablet, Rfl: 3    Allergies:   No Known Allergies    Objective     Physical Exam:  Vital Signs:   Vitals:    04/12/22 1422   BP: 156/90   Pulse: 86   SpO2: 94%   Weight: 102 kg (225 lb)   Height: 177.8 cm (70\")     Body mass index is 32.28 kg/m².     Physical Exam  Vitals and nursing note reviewed.   Constitutional:       General: He is not in acute distress.     Appearance: Normal appearance. He is not ill-appearing.   HENT:      Head: Normocephalic and atraumatic.      Right Ear: Tympanic membrane and ear canal normal.      Left Ear: Tympanic membrane and ear canal normal.      Nose: Nose normal.   Cardiovascular:      Rate and Rhythm: Normal rate and regular rhythm.      Heart sounds: Normal heart sounds.   Pulmonary:      Effort: Pulmonary effort is normal.      Breath sounds: Normal breath sounds.   Neurological:      Mental Status: He is alert and oriented to person, place, and time. Mental status is at baseline.   Psychiatric:         Mood and Affect: Mood normal.       BMI is above normal parameters. Recommendations: exercise counseling/recommendations and nutrition counseling/recommendations        Assessment / Plan      Assessment/Plan:   Diagnoses and all orders for this visit:    1. Atherosclerosis of coronary artery of native heart without angina pectoris, unspecified vessel or lesion type (Primary)  -     CBC Auto Differential; Future  -     Comprehensive Metabolic Panel; Future  -     Lipid Panel; Future  -     CBC Auto Differential  -     Comprehensive Metabolic Panel  -     Lipid Panel    2. Primary hypertension  -     CBC Auto Differential; Future  -     Comprehensive Metabolic Panel; Future  -     Lipid Panel; Future  -     TSH; Future  -     CBC Auto Differential  -     Comprehensive Metabolic Panel  -     Lipid Panel  -     TSH    3. Chronic kidney disease, unspecified CKD stage  -     CBC Auto Differential; Future  -     " Comprehensive Metabolic Panel; Future  -     Lipid Panel; Future  -     CBC Auto Differential  -     Comprehensive Metabolic Panel  -     Lipid Panel    4. Neuralgia of right sciatic nerve  -     POC Urine Drug Screen, Triage    5. Degeneration of lumbosacral intervertebral disc    6. Pulmonary emphysema, unspecified emphysema type (HCC)    7. Male hypogonadism  -     Testosterone Cypionate (DEPOTESTOTERONE CYPIONATE) 200 MG/ML injection; Inject 1 mL into the appropriate muscle as directed by prescriber Every 14 (Fourteen) Days.  Dispense: 2 mL; Refill: 2  -     Testosterone; Future  -     Testosterone; Future  -     Testosterone    8. Lumbar radiculopathy  -     gabapentin (NEURONTIN) 600 MG tablet; Take 1 tablet by mouth 4 (Four) Times a Day.  Dispense: 120 tablet; Refill: 5    9. Encounter for long-term (current) use of other medications  -     POC Urine Drug Screen, Triage    10. Poor dentition  -     Ambulatory Referral to Dentistry    Other orders  -     clopidogrel (PLAVIX) 75 MG tablet; Take 1 tablet by mouth Daily.  Dispense: 30 tablet; Refill: 3  -     tiotropium bromide monohydrate (SPIRIVA RESPIMAT) 2.5 MCG/ACT aerosol solution inhaler; Inhale 2 puffs Daily.  Dispense: 1 each; Refill: 5  -     Discontinue: atorvastatin (LIPITOR) 40 MG tablet; Take 2 tablets by mouth Daily.  Dispense: 30 tablet; Refill: 3  -     metoprolol tartrate (LOPRESSOR) 25 MG tablet; Take 1.5 tablets by mouth 2 (Two) Times a Day.  Dispense: 90 tablet; Refill: 3         Medication refills given today.  Labs done to reevaluate situation after his surgery.  I want to see him back in 6 weeks for reevaluation of his testosterone.  We will check labs again at that point.  Patient also requested a referral to  dentistry for evaluation.  Poor dentition.    Follow Up:   Return in about 6 weeks (around 5/24/2022) for Next scheduled follow up.    Chavo Duenas  [facility] [place]

## 2022-04-13 ENCOUNTER — TELEPHONE (OUTPATIENT)
Dept: FAMILY MEDICINE CLINIC | Facility: CLINIC | Age: 57
End: 2022-04-13

## 2022-04-13 LAB
ALBUMIN SERPL-MCNC: 3.9 G/DL (ref 3.8–4.9)
ALBUMIN/GLOB SERPL: 1.3 {RATIO} (ref 1.2–2.2)
ALP SERPL-CCNC: 122 IU/L (ref 44–121)
ALT SERPL-CCNC: 19 IU/L (ref 0–44)
AST SERPL-CCNC: 18 IU/L (ref 0–40)
BASOPHILS # BLD AUTO: 0.1 X10E3/UL (ref 0–0.2)
BASOPHILS NFR BLD AUTO: 1 %
BILIRUB SERPL-MCNC: 0.5 MG/DL (ref 0–1.2)
BUN SERPL-MCNC: 34 MG/DL (ref 6–24)
BUN/CREAT SERPL: 17 (ref 9–20)
CALCIUM SERPL-MCNC: 9.1 MG/DL (ref 8.7–10.2)
CHLORIDE SERPL-SCNC: 102 MMOL/L (ref 96–106)
CHOLEST SERPL-MCNC: 98 MG/DL (ref 100–199)
CO2 SERPL-SCNC: 22 MMOL/L (ref 20–29)
CREAT SERPL-MCNC: 2.01 MG/DL (ref 0.76–1.27)
EGFRCR SERPLBLD CKD-EPI 2021: 38 ML/MIN/1.73
EOSINOPHIL # BLD AUTO: 0.3 X10E3/UL (ref 0–0.4)
EOSINOPHIL NFR BLD AUTO: 3 %
ERYTHROCYTE [DISTWIDTH] IN BLOOD BY AUTOMATED COUNT: 13.6 % (ref 11.6–15.4)
GLOBULIN SER CALC-MCNC: 2.9 G/DL (ref 1.5–4.5)
GLUCOSE SERPL-MCNC: 154 MG/DL (ref 65–99)
HCT VFR BLD AUTO: 31.8 % (ref 37.5–51)
HDLC SERPL-MCNC: 44 MG/DL
HGB BLD-MCNC: 10.4 G/DL (ref 13–17.7)
IMM GRANULOCYTES # BLD AUTO: 0.1 X10E3/UL (ref 0–0.1)
IMM GRANULOCYTES NFR BLD AUTO: 1 %
LDLC SERPL CALC-MCNC: 35 MG/DL (ref 0–99)
LYMPHOCYTES # BLD AUTO: 1 X10E3/UL (ref 0.7–3.1)
LYMPHOCYTES NFR BLD AUTO: 10 %
MCH RBC QN AUTO: 30.1 PG (ref 26.6–33)
MCHC RBC AUTO-ENTMCNC: 32.7 G/DL (ref 31.5–35.7)
MCV RBC AUTO: 92 FL (ref 79–97)
MONOCYTES # BLD AUTO: 0.8 X10E3/UL (ref 0.1–0.9)
MONOCYTES NFR BLD AUTO: 8 %
NEUTROPHILS # BLD AUTO: 8.3 X10E3/UL (ref 1.4–7)
NEUTROPHILS NFR BLD AUTO: 77 %
PLATELET # BLD AUTO: 423 X10E3/UL (ref 150–450)
POTASSIUM SERPL-SCNC: 4.9 MMOL/L (ref 3.5–5.2)
PROT SERPL-MCNC: 6.8 G/DL (ref 6–8.5)
RBC # BLD AUTO: 3.45 X10E6/UL (ref 4.14–5.8)
SODIUM SERPL-SCNC: 141 MMOL/L (ref 134–144)
TESTOST SERPL-MCNC: 42 NG/DL (ref 264–916)
TRIGL SERPL-MCNC: 102 MG/DL (ref 0–149)
TSH SERPL DL<=0.005 MIU/L-ACNC: 1.22 UIU/ML (ref 0.45–4.5)
VLDLC SERPL CALC-MCNC: 19 MG/DL (ref 5–40)
WBC # BLD AUTO: 10.5 X10E3/UL (ref 3.4–10.8)

## 2022-04-14 ENCOUNTER — TELEPHONE (OUTPATIENT)
Dept: FAMILY MEDICINE CLINIC | Facility: CLINIC | Age: 57
End: 2022-04-14

## 2022-04-14 NOTE — TELEPHONE ENCOUNTER
Pt called stating at his last appt it had been discussed that Dr. Duenas would change up his testosterone med. Pt states when he got to the pharmacy he picked up the same med that he has been taking.

## 2022-05-16 ENCOUNTER — LAB (OUTPATIENT)
Dept: FAMILY MEDICINE CLINIC | Facility: CLINIC | Age: 57
End: 2022-05-16

## 2022-05-16 DIAGNOSIS — I25.10 ATHEROSCLEROSIS OF CORONARY ARTERY OF NATIVE HEART WITHOUT ANGINA PECTORIS, UNSPECIFIED VESSEL OR LESION TYPE: Primary | ICD-10-CM

## 2022-05-16 DIAGNOSIS — I25.10 ATHEROSCLEROSIS OF CORONARY ARTERY OF NATIVE HEART WITHOUT ANGINA PECTORIS, UNSPECIFIED VESSEL OR LESION TYPE: ICD-10-CM

## 2022-05-16 DIAGNOSIS — E29.1 MALE HYPOGONADISM: ICD-10-CM

## 2022-05-16 PROCEDURE — 36415 COLL VENOUS BLD VENIPUNCTURE: CPT | Performed by: FAMILY MEDICINE

## 2022-05-17 LAB
ALBUMIN SERPL-MCNC: 4.1 G/DL (ref 3.8–4.9)
ALBUMIN/GLOB SERPL: 1.4 {RATIO} (ref 1.2–2.2)
ALP SERPL-CCNC: 139 IU/L (ref 44–121)
ALT SERPL-CCNC: 13 IU/L (ref 0–44)
AST SERPL-CCNC: 11 IU/L (ref 0–40)
BASOPHILS # BLD AUTO: 0.1 X10E3/UL (ref 0–0.2)
BASOPHILS NFR BLD AUTO: 1 %
BILIRUB SERPL-MCNC: 0.3 MG/DL (ref 0–1.2)
BUN SERPL-MCNC: 22 MG/DL (ref 6–24)
BUN/CREAT SERPL: 12 (ref 9–20)
CALCIUM SERPL-MCNC: 8.9 MG/DL (ref 8.7–10.2)
CHLORIDE SERPL-SCNC: 107 MMOL/L (ref 96–106)
CO2 SERPL-SCNC: 20 MMOL/L (ref 20–29)
CREAT SERPL-MCNC: 1.8 MG/DL (ref 0.76–1.27)
EGFRCR SERPLBLD CKD-EPI 2021: 43 ML/MIN/1.73
EOSINOPHIL # BLD AUTO: 0.5 X10E3/UL (ref 0–0.4)
EOSINOPHIL NFR BLD AUTO: 6 %
ERYTHROCYTE [DISTWIDTH] IN BLOOD BY AUTOMATED COUNT: 13.9 % (ref 11.6–15.4)
GLOBULIN SER CALC-MCNC: 2.9 G/DL (ref 1.5–4.5)
GLUCOSE SERPL-MCNC: 167 MG/DL (ref 65–99)
HCT VFR BLD AUTO: 39.8 % (ref 37.5–51)
HGB BLD-MCNC: 12.6 G/DL (ref 13–17.7)
IMM GRANULOCYTES # BLD AUTO: 0 X10E3/UL (ref 0–0.1)
IMM GRANULOCYTES NFR BLD AUTO: 0 %
LYMPHOCYTES # BLD AUTO: 1.5 X10E3/UL (ref 0.7–3.1)
LYMPHOCYTES NFR BLD AUTO: 17 %
MCH RBC QN AUTO: 28.6 PG (ref 26.6–33)
MCHC RBC AUTO-ENTMCNC: 31.7 G/DL (ref 31.5–35.7)
MCV RBC AUTO: 90 FL (ref 79–97)
MONOCYTES # BLD AUTO: 0.8 X10E3/UL (ref 0.1–0.9)
MONOCYTES NFR BLD AUTO: 9 %
NEUTROPHILS # BLD AUTO: 5.8 X10E3/UL (ref 1.4–7)
NEUTROPHILS NFR BLD AUTO: 67 %
PLATELET # BLD AUTO: 251 X10E3/UL (ref 150–450)
POTASSIUM SERPL-SCNC: 4.7 MMOL/L (ref 3.5–5.2)
PROT SERPL-MCNC: 7 G/DL (ref 6–8.5)
RBC # BLD AUTO: 4.41 X10E6/UL (ref 4.14–5.8)
SODIUM SERPL-SCNC: 145 MMOL/L (ref 134–144)
TESTOST SERPL-MCNC: 595 NG/DL (ref 264–916)
WBC # BLD AUTO: 8.7 X10E3/UL (ref 3.4–10.8)

## 2022-05-23 ENCOUNTER — OFFICE VISIT (OUTPATIENT)
Dept: FAMILY MEDICINE CLINIC | Facility: CLINIC | Age: 57
End: 2022-05-23

## 2022-05-23 VITALS
DIASTOLIC BLOOD PRESSURE: 106 MMHG | BODY MASS INDEX: 31.64 KG/M2 | SYSTOLIC BLOOD PRESSURE: 170 MMHG | WEIGHT: 221 LBS | OXYGEN SATURATION: 96 % | HEIGHT: 70 IN | HEART RATE: 72 BPM

## 2022-05-23 DIAGNOSIS — E11.9 TYPE 2 DIABETES MELLITUS WITHOUT COMPLICATION, WITHOUT LONG-TERM CURRENT USE OF INSULIN: Primary | ICD-10-CM

## 2022-05-23 DIAGNOSIS — N18.9 CHRONIC KIDNEY DISEASE, UNSPECIFIED CKD STAGE: ICD-10-CM

## 2022-05-23 DIAGNOSIS — M54.16 LUMBAR RADICULOPATHY: ICD-10-CM

## 2022-05-23 DIAGNOSIS — E29.1 MALE HYPOGONADISM: ICD-10-CM

## 2022-05-23 PROCEDURE — 99214 OFFICE O/P EST MOD 30 MIN: CPT | Performed by: FAMILY MEDICINE

## 2022-05-23 RX ORDER — TESTOSTERONE CYPIONATE 200 MG/ML
200 INJECTION, SOLUTION INTRAMUSCULAR
Qty: 10 ML | Refills: 0 | Status: SHIPPED | OUTPATIENT
Start: 2022-05-23 | End: 2022-11-07

## 2022-05-23 RX ORDER — CLOPIDOGREL BISULFATE 75 MG/1
75 TABLET ORAL DAILY
Qty: 90 TABLET | Refills: 1 | Status: SHIPPED | OUTPATIENT
Start: 2022-05-23 | End: 2022-11-07

## 2022-05-23 RX ORDER — ALBUTEROL SULFATE 90 UG/1
2 AEROSOL, METERED RESPIRATORY (INHALATION) EVERY 4 HOURS PRN
Qty: 18 G | Refills: 2 | Status: SHIPPED | OUTPATIENT
Start: 2022-05-23

## 2022-05-23 RX ORDER — ATORVASTATIN CALCIUM 80 MG/1
80 TABLET, FILM COATED ORAL DAILY
Qty: 90 TABLET | Refills: 1 | Status: SHIPPED | OUTPATIENT
Start: 2022-05-23 | End: 2022-11-07

## 2022-05-23 RX ORDER — METOPROLOL TARTRATE 50 MG/1
50 TABLET, FILM COATED ORAL 2 TIMES DAILY
Qty: 180 TABLET | Refills: 1 | Status: SHIPPED | OUTPATIENT
Start: 2022-05-23 | End: 2022-11-07

## 2022-05-23 NOTE — PROGRESS NOTES
Follow Up Office Visit      Date of Visit:  2022   Patient Name: Ibrahima Martinez  : 1965   MRN: [id number]     Chief Complaint:    Chief Complaint   Patient presents with   • go over labs       History of Present Illness: Ibrahima Martinez is a 57 y.o. male who is here today for follow up.  Patient here for follow-up.        Subjective      Review of Systems:   Review of Systems   Constitutional: Negative for fatigue and fever.   HENT: Negative for congestion and ear pain.    Respiratory: Negative for apnea, cough, chest tightness and shortness of breath.    Cardiovascular: Negative for chest pain.   Gastrointestinal: Negative for abdominal pain, constipation, diarrhea and nausea.   Musculoskeletal: Negative for arthralgias.   Psychiatric/Behavioral: Negative for depressed mood and stress.       Past Medical History:   Past Medical History:   Diagnosis Date   • Back problem    • Diabetes (HCC)    • Heart disease    • Hypertension        Past Surgical History:   Past Surgical History:   Procedure Laterality Date   • CARDIAC CATHETERIZATION     • CORONARY STENT PLACEMENT     • EPIDURAL BLOCK      For right-sided sciatica pain.        Family History:   Family History   Problem Relation Age of Onset   • Heart attack Father    • Hypertension Father    • Heart disease Father    • Arthritis Mother        Social History:   Social History     Socioeconomic History   • Marital status:    Tobacco Use   • Smoking status: Former Smoker     Packs/day: 1.00     Years: 15.00     Pack years: 15.00     Types: Cigarettes     Quit date: 2022     Years since quittin.1   • Smokeless tobacco: Never Used   Vaping Use   • Vaping Use: Never used   Substance and Sexual Activity   • Alcohol use: No   • Drug use: No   • Sexual activity: Defer       Medications:     Current Outpatient Medications:   •  aspirin 81 MG tablet, Take  by mouth Daily., Disp: , Rfl:   •  atorvastatin (LIPITOR) 80 MG tablet, Take 1 tablet by  "mouth Daily., Disp: 90 tablet, Rfl: 1  •  clopidogrel (PLAVIX) 75 MG tablet, Take 1 tablet by mouth Daily., Disp: 90 tablet, Rfl: 1  •  metoprolol tartrate (LOPRESSOR) 50 MG tablet, Take 1 tablet by mouth 2 (Two) Times a Day., Disp: 180 tablet, Rfl: 1  •  mometasone-formoterol (DULERA 100) 100-5 MCG/ACT inhaler, Inhale 2 puffs 2 (Two) Times a Day., Disp: 1 each, Rfl: 5  •  Testosterone Cypionate (DEPOTESTOTERONE CYPIONATE) 200 MG/ML injection, Inject 1 mL into the appropriate muscle as directed by prescriber Every 14 (Fourteen) Days., Disp: 10 mL, Rfl: 0  •  Acetaminophen Extra Strength 500 MG tablet, , Disp: , Rfl:   •  albuterol sulfate  (90 Base) MCG/ACT inhaler, Inhale 2 puffs Every 4 (Four) Hours As Needed for Wheezing., Disp: 18 g, Rfl: 2  •  gabapentin (NEURONTIN) 600 MG tablet, Take 1 tablet by mouth 4 (Four) Times a Day., Disp: 120 tablet, Rfl: 5  •  METHADONE HCL PO, Take 100 mg by mouth Every Morning., Disp: , Rfl:   •  nitroglycerin (NITROSTAT) 0.4 MG SL tablet, , Disp: , Rfl:   •  ONETOUCH DELICA LANCETS 33G misc, , Disp: , Rfl:   •  Stool Softener 100 MG capsule, , Disp: , Rfl:   •  tiotropium bromide monohydrate (SPIRIVA RESPIMAT) 2.5 MCG/ACT aerosol solution inhaler, Inhale 2 puffs Daily., Disp: 1 each, Rfl: 5    Allergies:   No Known Allergies    Objective     Physical Exam:  Vital Signs:   Vitals:    05/23/22 1349   BP: (!) 170/106   Pulse: 72   SpO2: 96%   Weight: 100 kg (221 lb)   Height: 177.8 cm (70\")     Body mass index is 31.71 kg/m².     Physical Exam  Vitals and nursing note reviewed.   Constitutional:       General: He is not in acute distress.     Appearance: Normal appearance. He is not ill-appearing.   HENT:      Head: Normocephalic and atraumatic.      Right Ear: Tympanic membrane and ear canal normal.      Left Ear: Tympanic membrane and ear canal normal.      Nose: Nose normal.   Cardiovascular:      Rate and Rhythm: Normal rate and regular rhythm.      Heart sounds: Normal " heart sounds.   Pulmonary:      Effort: Pulmonary effort is normal.      Breath sounds: Normal breath sounds.   Neurological:      Mental Status: He is alert and oriented to person, place, and time. Mental status is at baseline.   Psychiatric:         Mood and Affect: Mood normal.         Procedures    BMI is >= 30 and <= 34.9 (Class 1 obesity). The following options were offered after discussion: exercise counseling/recommendations and nutrition counseling/recommendations       Assessment / Plan      Assessment/Plan:   Diagnoses and all orders for this visit:    1. Type 2 diabetes mellitus without complication, without long-term current use of insulin (HCC) (Primary)  -     CBC Auto Differential; Future  -     Comprehensive Metabolic Panel; Future  -     Lipid Panel; Future  -     Hemoglobin A1c; Future    2. Male hypogonadism  -     Testosterone Cypionate (DEPOTESTOTERONE CYPIONATE) 200 MG/ML injection; Inject 1 mL into the appropriate muscle as directed by prescriber Every 14 (Fourteen) Days.  Dispense: 10 mL; Refill: 0    3. Chronic kidney disease, unspecified CKD stage    4. Lumbar radiculopathy    Other orders  -     metoprolol tartrate (LOPRESSOR) 50 MG tablet; Take 1 tablet by mouth 2 (Two) Times a Day.  Dispense: 180 tablet; Refill: 1  -     atorvastatin (LIPITOR) 80 MG tablet; Take 1 tablet by mouth Daily.  Dispense: 90 tablet; Refill: 1  -     clopidogrel (PLAVIX) 75 MG tablet; Take 1 tablet by mouth Daily.  Dispense: 90 tablet; Refill: 1  -     albuterol sulfate  (90 Base) MCG/ACT inhaler; Inhale 2 puffs Every 4 (Four) Hours As Needed for Wheezing.  Dispense: 18 g; Refill: 2  -     mometasone-formoterol (DULERA 100) 100-5 MCG/ACT inhaler; Inhale 2 puffs 2 (Two) Times a Day.  Dispense: 1 each; Refill: 5         Recent labs show his renal function to be stable.  His testosterone level is appropriate.  His blood pressure is still high so we are going to increase his dose to 50 mg of the metoprolol  twice daily.  Refilled his Dulera Plavix and gave albuterol.    Follow Up:   Return in about 3 months (around 8/23/2022) for Recheck.    Chavo Duenas  [facility] [place]

## 2022-09-20 ENCOUNTER — TELEPHONE (OUTPATIENT)
Dept: FAMILY MEDICINE CLINIC | Facility: CLINIC | Age: 57
End: 2022-09-20

## 2022-10-03 ENCOUNTER — TELEPHONE (OUTPATIENT)
Dept: FAMILY MEDICINE CLINIC | Facility: CLINIC | Age: 57
End: 2022-10-03

## 2022-11-05 DIAGNOSIS — E29.1 MALE HYPOGONADISM: ICD-10-CM

## 2022-11-07 RX ORDER — CLOPIDOGREL BISULFATE 75 MG/1
TABLET ORAL
Qty: 90 TABLET | Refills: 0 | Status: SHIPPED | OUTPATIENT
Start: 2022-11-07 | End: 2023-02-21 | Stop reason: SDUPTHER

## 2022-11-07 RX ORDER — ATORVASTATIN CALCIUM 80 MG/1
TABLET, FILM COATED ORAL
Qty: 90 TABLET | Refills: 0 | Status: SHIPPED | OUTPATIENT
Start: 2022-11-07 | End: 2023-02-21 | Stop reason: SDUPTHER

## 2022-11-07 RX ORDER — METOPROLOL TARTRATE 50 MG/1
TABLET, FILM COATED ORAL
Qty: 180 TABLET | Refills: 0 | Status: SHIPPED | OUTPATIENT
Start: 2022-11-07 | End: 2023-02-21 | Stop reason: SDUPTHER

## 2022-11-07 RX ORDER — TESTOSTERONE CYPIONATE 200 MG/ML
INJECTION, SOLUTION INTRAMUSCULAR
Qty: 10 ML | Refills: 0 | Status: SHIPPED | OUTPATIENT
Start: 2022-11-07 | End: 2023-03-27 | Stop reason: SDUPTHER

## 2023-02-17 ENCOUNTER — TELEPHONE (OUTPATIENT)
Dept: FAMILY MEDICINE CLINIC | Facility: CLINIC | Age: 58
End: 2023-02-17
Payer: COMMERCIAL

## 2023-02-21 ENCOUNTER — TELEPHONE (OUTPATIENT)
Dept: FAMILY MEDICINE CLINIC | Facility: CLINIC | Age: 58
End: 2023-02-21
Payer: COMMERCIAL

## 2023-02-21 RX ORDER — ATORVASTATIN CALCIUM 80 MG/1
80 TABLET, FILM COATED ORAL DAILY
Qty: 90 TABLET | Refills: 0 | Status: SHIPPED | OUTPATIENT
Start: 2023-02-21

## 2023-02-21 RX ORDER — METOPROLOL TARTRATE 50 MG/1
50 TABLET, FILM COATED ORAL 2 TIMES DAILY
Qty: 180 TABLET | Refills: 0 | Status: SHIPPED | OUTPATIENT
Start: 2023-02-21

## 2023-02-21 RX ORDER — CLOPIDOGREL BISULFATE 75 MG/1
75 TABLET ORAL DAILY
Qty: 90 TABLET | Refills: 0 | Status: SHIPPED | OUTPATIENT
Start: 2023-02-21

## 2023-02-21 NOTE — TELEPHONE ENCOUNTER
Informed patient he needed an appointment to see  to get re-established on his controlled medications. I scheduled him for his next available but he will be out of his other medications before then. Wants them sent to [facility].

## 2023-03-06 ENCOUNTER — OFFICE VISIT (OUTPATIENT)
Dept: FAMILY MEDICINE CLINIC | Facility: CLINIC | Age: 58
End: 2023-03-06
Payer: COMMERCIAL

## 2023-03-06 VITALS
HEART RATE: 76 BPM | OXYGEN SATURATION: 95 % | DIASTOLIC BLOOD PRESSURE: 90 MMHG | BODY MASS INDEX: 29.35 KG/M2 | WEIGHT: 205 LBS | SYSTOLIC BLOOD PRESSURE: 154 MMHG | HEIGHT: 70 IN

## 2023-03-06 DIAGNOSIS — E11.65 TYPE 2 DIABETES MELLITUS WITH HYPERGLYCEMIA, WITHOUT LONG-TERM CURRENT USE OF INSULIN: Primary | ICD-10-CM

## 2023-03-06 DIAGNOSIS — E29.1 MALE HYPOGONADISM: ICD-10-CM

## 2023-03-06 DIAGNOSIS — M54.16 LUMBAR RADICULOPATHY: ICD-10-CM

## 2023-03-06 DIAGNOSIS — Z79.899 HIGH RISK MEDICATION USE: ICD-10-CM

## 2023-03-06 DIAGNOSIS — I10 HYPERTENSION, ESSENTIAL: ICD-10-CM

## 2023-03-06 DIAGNOSIS — E78.2 HYPERLIPIDEMIA, MIXED: ICD-10-CM

## 2023-03-06 DIAGNOSIS — Z12.5 PROSTATE CANCER SCREENING: ICD-10-CM

## 2023-03-06 LAB
POC AMPHETAMINES: NEGATIVE
POC BARBITURATES: NEGATIVE
POC BENZODIAZEPHINES: NEGATIVE
POC COCAINE: NEGATIVE
POC METHADONE: POSITIVE
POC METHAMPHETAMINE SCREEN URINE: NEGATIVE
POC OPIATES: NEGATIVE
POC OXYCODONE: NEGATIVE
POC PHENCYCLIDINE: NEGATIVE
POC PROPOXYPHENE: NEGATIVE
POC THC: NEGATIVE
POC TRICYCLIC ANTIDEPRESSANTS: NEGATIVE

## 2023-03-06 PROCEDURE — 80305 DRUG TEST PRSMV DIR OPT OBS: CPT | Performed by: PHYSICIAN ASSISTANT

## 2023-03-06 PROCEDURE — 99214 OFFICE O/P EST MOD 30 MIN: CPT | Performed by: PHYSICIAN ASSISTANT

## 2023-03-06 PROCEDURE — 36415 COLL VENOUS BLD VENIPUNCTURE: CPT | Performed by: PHYSICIAN ASSISTANT

## 2023-03-06 NOTE — PROGRESS NOTES
"Chief Complaint  Med Refill    Subjective          Ibrahima Martinez presents to [facility]  History of Present Illness  Patient in today wanting to get refills on his testosterone and his gabapentin for chronic lower back pain.   He had to cancel his upcoming appointment with his regular PCP and wanted to see about getting a short fill until he was able to reschedule.  He is overdue for fasting labs and would like to get those done today.  He states has been off the testosterone for about 6 weeks and sweating symptoms and fatigue have returned. He states he just recently ran out of gabapentin as was not having to take four times a day every day. He states he does check his bp at home and it is in normal range- states always goes up when here. Denies any chest pain or shortness of breath. States was diagnosed with daibetes but was taken off medication last year. He admits does not check his sugar often. He states has never had colonoscopy and declines the cscpy and the cologuard today.   Hypertension  This is a chronic problem. Pertinent negatives include no chest pain or shortness of breath. Current antihypertension treatment includes beta blockers.   Diabetes  He presents for his follow-up diabetic visit. He has type 2 diabetes mellitus. Pertinent negatives for hypoglycemia include no dizziness. Pertinent negatives for diabetes include no chest pain, no fatigue, no polydipsia, no polyphagia and no polyuria. Eye exam is not current.       Objective   Vital Signs:   /90 (BP Location: Left arm, Patient Position: Sitting, Cuff Size: Large Adult)   Pulse 76   Ht 177.8 cm (70\")   Wt 93 kg (205 lb)   SpO2 95%   BMI 29.41 kg/m²     Body mass index is 29.41 kg/m².    Review of Systems   Constitutional: Negative for fatigue.   Respiratory: Negative for cough and shortness of breath.    Cardiovascular: Negative for chest pain.   Gastrointestinal: Negative for abdominal pain, blood in " stool, diarrhea, nausea and vomiting.   Endocrine: Negative for polydipsia, polyphagia and polyuria.   Musculoskeletal: Positive for back pain.   Neurological: Negative for dizziness and headache.       Past History:  Medical History: has a past medical history of Back problem, Diabetes (HCC), Heart disease, and Hypertension.   Surgical History: has a past surgical history that includes Cardiac catheterization; Coronary stent placement; and Epidural block injection (2021).   Family History: family history includes Arthritis in his mother; Heart attack in his father; Heart disease in his father; Hypertension in his father.   Social History: reports that he has quit smoking. His smoking use included cigarettes. He has a 15.00 pack-year smoking history. He has never used smokeless tobacco. He reports that he does not drink alcohol and does not use drugs.      Current Outpatient Medications:   •  Acetaminophen Extra Strength 500 MG tablet, , Disp: , Rfl:   •  albuterol sulfate  (90 Base) MCG/ACT inhaler, Inhale 2 puffs Every 4 (Four) Hours As Needed for Wheezing., Disp: 18 g, Rfl: 2  •  aspirin 81 MG tablet, Take  by mouth Daily., Disp: , Rfl:   •  atorvastatin (LIPITOR) 80 MG tablet, Take 1 tablet by mouth Daily., Disp: 90 tablet, Rfl: 0  •  clopidogrel (PLAVIX) 75 MG tablet, Take 1 tablet by mouth Daily., Disp: 90 tablet, Rfl: 0  •  gabapentin (NEURONTIN) 600 MG tablet, Take 1 tablet by mouth 4 (Four) Times a Day., Disp: 120 tablet, Rfl: 5  •  metoprolol tartrate (LOPRESSOR) 50 MG tablet, Take 1 tablet by mouth 2 (Two) Times a Day., Disp: 180 tablet, Rfl: 0  •  mometasone-formoterol (DULERA 100) 100-5 MCG/ACT inhaler, Inhale 2 puffs 2 (Two) Times a Day., Disp: 1 each, Rfl: 5  •  nitroglycerin (NITROSTAT) 0.4 MG SL tablet, , Disp: , Rfl:   •  ONETOUCH DELICA LANCETS 33G misc, , Disp: , Rfl:   •  Oxymetazoline HCl (NASAL SPRAY NA), into the nostril(s) as directed by provider., Disp: , Rfl:   •  Stool Softener 100  MG capsule, , Disp: , Rfl:   •  Testosterone Cypionate (DEPOTESTOTERONE CYPIONATE) 200 MG/ML injection, INJECT 1 ML INTRAMUSCULAR AS DIRECTED BY PRESCRIBER EVERY 14 (FOURTEEN) DAYS., Disp: 10 mL, Rfl: 0  •  tiotropium bromide monohydrate (SPIRIVA RESPIMAT) 2.5 MCG/ACT aerosol solution inhaler, Inhale 2 puffs Daily., Disp: 1 each, Rfl: 5  Allergies: Patient has no known allergies.    Physical Exam  Constitutional:       Appearance: Normal appearance.   HENT:      Right Ear: Tympanic membrane normal.      Left Ear: Tympanic membrane normal.      Mouth/Throat:      Pharynx: Oropharynx is clear.   Eyes:      Conjunctiva/sclera: Conjunctivae normal.      Pupils: Pupils are equal, round, and reactive to light.   Cardiovascular:      Rate and Rhythm: Normal rate and regular rhythm.      Heart sounds: Normal heart sounds.   Pulmonary:      Effort: Pulmonary effort is normal.      Breath sounds: Normal breath sounds.   Abdominal:      Palpations: Abdomen is soft.      Tenderness: There is no abdominal tenderness.   Musculoskeletal:      Comments: Walks with nml gait. Tenderness noted to lower back and into legs.    Neurological:      Mental Status: He is oriented to person, place, and time.   Psychiatric:         Mood and Affect: Mood normal.         Behavior: Behavior normal.             Assessment and Plan   Diagnoses and all orders for this visit:    1. Type 2 diabetes mellitus with hyperglycemia, without long-term current use of insulin (HCC) (Primary)  -     Hemoglobin A1c; Future  -     Hemoglobin A1c  Will check hga1c today. Encouraged healthy diet and exercise. Will check fasting labs today.   2. Lumbar radiculopathy  I discussed medication refill with PCP and he will need to see him in office for refill so appointment was scheduled. Urine drug screen and ANTONETTE today.   3. Male hypogonadism  -     Testosterone; Future  -     Testosterone  Will check testosterone level . I discussed with PCP and he will need to see  him in the office for refill so appointment was scheduled.  Have scheduled f/up appt to discuss further medication refills.   4. Hypertension, essential  -     CBC & Differential; Future  -     Comprehensive Metabolic Panel; Future  -     TSH; Future  -     CBC & Differential  -     Comprehensive Metabolic Panel  -     TSH  Patients states he monitors his bp at home and is in normal range so does not feel needs to have any adjustment to medication. Recommend for him to keep bp log and f/up at next appt. RTC prior if staying elevated.   5. Hyperlipidemia, mixed  -     Lipid Panel; Future  -     Lipid Panel  Fasting lipid panel today- he is taking atorvastatin.   6. Prostate cancer screening  -     PSA Screen; Future  -     PSA Screen    7. High risk medication use  -     POC Urine Drug Screen, Triage; Future            Follow Up   No follow-ups on file.  Patient was given instructions and counseling regarding his condition or for health maintenance advice. Please see specific information pulled into the AVS if appropriate.     Yandy Cleveland PA-C

## 2023-03-07 LAB
ALBUMIN SERPL-MCNC: 4.4 G/DL (ref 3.8–4.9)
ALBUMIN/GLOB SERPL: 1.5 {RATIO} (ref 1.2–2.2)
ALP SERPL-CCNC: 132 IU/L (ref 44–121)
ALT SERPL-CCNC: 18 IU/L (ref 0–44)
AST SERPL-CCNC: 14 IU/L (ref 0–40)
BASOPHILS # BLD AUTO: 0 X10E3/UL (ref 0–0.2)
BASOPHILS NFR BLD AUTO: 1 %
BILIRUB SERPL-MCNC: 0.5 MG/DL (ref 0–1.2)
BUN SERPL-MCNC: 34 MG/DL (ref 6–24)
BUN/CREAT SERPL: 19 (ref 9–20)
CALCIUM SERPL-MCNC: 9.5 MG/DL (ref 8.7–10.2)
CHLORIDE SERPL-SCNC: 103 MMOL/L (ref 96–106)
CHOLEST SERPL-MCNC: 100 MG/DL (ref 100–199)
CO2 SERPL-SCNC: 22 MMOL/L (ref 20–29)
CREAT SERPL-MCNC: 1.79 MG/DL (ref 0.76–1.27)
EGFRCR SERPLBLD CKD-EPI 2021: 43 ML/MIN/1.73
EOSINOPHIL # BLD AUTO: 0.2 X10E3/UL (ref 0–0.4)
EOSINOPHIL NFR BLD AUTO: 3 %
ERYTHROCYTE [DISTWIDTH] IN BLOOD BY AUTOMATED COUNT: 13.1 % (ref 11.6–15.4)
GLOBULIN SER CALC-MCNC: 2.9 G/DL (ref 1.5–4.5)
GLUCOSE SERPL-MCNC: 184 MG/DL (ref 70–99)
HBA1C MFR BLD: 6.5 % (ref 4.8–5.6)
HCT VFR BLD AUTO: 47.1 % (ref 37.5–51)
HDLC SERPL-MCNC: 42 MG/DL
HGB BLD-MCNC: 15.9 G/DL (ref 13–17.7)
IMM GRANULOCYTES # BLD AUTO: 0 X10E3/UL (ref 0–0.1)
IMM GRANULOCYTES NFR BLD AUTO: 0 %
LDLC SERPL CALC-MCNC: 38 MG/DL (ref 0–99)
LYMPHOCYTES # BLD AUTO: 1.3 X10E3/UL (ref 0.7–3.1)
LYMPHOCYTES NFR BLD AUTO: 15 %
MCH RBC QN AUTO: 31.9 PG (ref 26.6–33)
MCHC RBC AUTO-ENTMCNC: 33.8 G/DL (ref 31.5–35.7)
MCV RBC AUTO: 95 FL (ref 79–97)
MONOCYTES # BLD AUTO: 0.6 X10E3/UL (ref 0.1–0.9)
MONOCYTES NFR BLD AUTO: 7 %
NEUTROPHILS # BLD AUTO: 6.6 X10E3/UL (ref 1.4–7)
NEUTROPHILS NFR BLD AUTO: 74 %
PLATELET # BLD AUTO: 166 X10E3/UL (ref 150–450)
POTASSIUM SERPL-SCNC: 4.8 MMOL/L (ref 3.5–5.2)
PROT SERPL-MCNC: 7.3 G/DL (ref 6–8.5)
PSA SERPL-MCNC: 0.9 NG/ML (ref 0–4)
RBC # BLD AUTO: 4.98 X10E6/UL (ref 4.14–5.8)
SODIUM SERPL-SCNC: 141 MMOL/L (ref 134–144)
TESTOST SERPL-MCNC: 91 NG/DL (ref 264–916)
TRIGL SERPL-MCNC: 109 MG/DL (ref 0–149)
TSH SERPL DL<=0.005 MIU/L-ACNC: 0.62 UIU/ML (ref 0.45–4.5)
VLDLC SERPL CALC-MCNC: 20 MG/DL (ref 5–40)
WBC # BLD AUTO: 8.8 X10E3/UL (ref 3.4–10.8)

## 2023-03-08 DIAGNOSIS — R74.8 ELEVATED ALKALINE PHOSPHATASE LEVEL: Primary | ICD-10-CM

## 2023-03-27 ENCOUNTER — OFFICE VISIT (OUTPATIENT)
Dept: FAMILY MEDICINE CLINIC | Facility: CLINIC | Age: 58
End: 2023-03-27
Payer: COMMERCIAL

## 2023-03-27 VITALS
HEART RATE: 69 BPM | SYSTOLIC BLOOD PRESSURE: 146 MMHG | WEIGHT: 198 LBS | OXYGEN SATURATION: 96 % | HEIGHT: 70 IN | DIASTOLIC BLOOD PRESSURE: 80 MMHG | BODY MASS INDEX: 28.35 KG/M2

## 2023-03-27 DIAGNOSIS — M54.16 LUMBAR RADICULOPATHY: ICD-10-CM

## 2023-03-27 DIAGNOSIS — I25.10 ATHEROSCLEROSIS OF CORONARY ARTERY OF NATIVE HEART WITHOUT ANGINA PECTORIS, UNSPECIFIED VESSEL OR LESION TYPE: ICD-10-CM

## 2023-03-27 DIAGNOSIS — B35.1 NAIL FUNGUS: ICD-10-CM

## 2023-03-27 DIAGNOSIS — J43.9 PULMONARY EMPHYSEMA, UNSPECIFIED EMPHYSEMA TYPE: ICD-10-CM

## 2023-03-27 DIAGNOSIS — K21.00 GASTROESOPHAGEAL REFLUX DISEASE WITH ESOPHAGITIS WITHOUT HEMORRHAGE: ICD-10-CM

## 2023-03-27 DIAGNOSIS — G60.9 HEREDITARY PERIPHERAL NEUROPATHY: Primary | ICD-10-CM

## 2023-03-27 DIAGNOSIS — M51.37 DEGENERATION OF LUMBOSACRAL INTERVERTEBRAL DISC: ICD-10-CM

## 2023-03-27 DIAGNOSIS — E29.1 MALE HYPOGONADISM: ICD-10-CM

## 2023-03-27 DIAGNOSIS — I10 PRIMARY HYPERTENSION: ICD-10-CM

## 2023-03-27 PROCEDURE — 99214 OFFICE O/P EST MOD 30 MIN: CPT | Performed by: FAMILY MEDICINE

## 2023-03-27 PROCEDURE — 3079F DIAST BP 80-89 MM HG: CPT | Performed by: FAMILY MEDICINE

## 2023-03-27 PROCEDURE — 1159F MED LIST DOCD IN RCRD: CPT | Performed by: FAMILY MEDICINE

## 2023-03-27 PROCEDURE — 3077F SYST BP >= 140 MM HG: CPT | Performed by: FAMILY MEDICINE

## 2023-03-27 PROCEDURE — 1160F RVW MEDS BY RX/DR IN RCRD: CPT | Performed by: FAMILY MEDICINE

## 2023-03-27 PROCEDURE — 3044F HG A1C LEVEL LT 7.0%: CPT | Performed by: FAMILY MEDICINE

## 2023-03-27 RX ORDER — GABAPENTIN 600 MG/1
600 TABLET ORAL 4 TIMES DAILY
Qty: 120 TABLET | Refills: 5 | Status: SHIPPED | OUTPATIENT
Start: 2023-03-27

## 2023-03-27 RX ORDER — TERBINAFINE HYDROCHLORIDE 250 MG/1
250 TABLET ORAL
Qty: 45 TABLET | Refills: 0 | Status: SHIPPED | OUTPATIENT
Start: 2023-03-27

## 2023-03-27 RX ORDER — TESTOSTERONE CYPIONATE 200 MG/ML
INJECTION, SOLUTION INTRAMUSCULAR
Qty: 10 ML | Refills: 0 | Status: SHIPPED | OUTPATIENT
Start: 2023-03-27

## 2023-03-27 NOTE — PROGRESS NOTES
Office Note     Name: Ibrahima Martinez    : 1965     MRN: [id number]     Chief Complaint  Med Refill (Needs gabapentin and testosterone)    Subjective     History of Present Illness:  Ibrahima Martinez is a 57 y.o. male who presents today for for testosterone injection, and gabapentin 600 4 times daily keeping his cold feeling in his both feet.  I was not taking anyone who is on controlled substances but I believe get him through couple months to get into [facility].  He knows not why they referred him to urology but did consider to DrManuel Would refill his testosterone.  Total PSA equal 0.9 within normal limits    Review of Systems:   Review of Systems    Past Medical History:   Past Medical History:   Diagnosis Date   • Back problem    • Diabetes (HCC)    • Heart disease    • Hypertension        Past Surgical History:   Past Surgical History:   Procedure Laterality Date   • CARDIAC CATHETERIZATION     • CORONARY STENT PLACEMENT     • EPIDURAL BLOCK      For right-sided sciatica pain.        Family History:   Family History   Problem Relation Age of Onset   • Heart attack Father    • Hypertension Father    • Heart disease Father    • Arthritis Mother        Social History:   Social History     Socioeconomic History   • Marital status:    Tobacco Use   • Smoking status: Former     Packs/day: 1.00     Years: 15.00     Pack years: 15.00     Types: Cigarettes   • Smokeless tobacco: Never   Vaping Use   • Vaping Use: Never used   Substance and Sexual Activity   • Alcohol use: No   • Drug use: No   • Sexual activity: Defer       Immunizations:   There is no immunization history on file for this patient.     Medications:     Current Outpatient Medications:   •  gabapentin (NEURONTIN) 600 MG tablet, Take 1 tablet by mouth 4 (Four) Times a Day., Disp: 120 tablet, Rfl: 5  •  Testosterone Cypionate (DEPOTESTOTERONE CYPIONATE) 200 MG/ML injection, 1 ml every 10 days , im, Disp: 10 mL, Rfl: 0  •  tiotropium bromide  "monohydrate (SPIRIVA RESPIMAT) 2.5 MCG/ACT aerosol solution inhaler, Inhale 2 puffs Daily., Disp: 1 each, Rfl: 5  •  Acetaminophen Extra Strength 500 MG tablet, , Disp: , Rfl:   •  albuterol sulfate  (90 Base) MCG/ACT inhaler, Inhale 2 puffs Every 4 (Four) Hours As Needed for Wheezing., Disp: 18 g, Rfl: 2  •  aspirin 81 MG tablet, Take  by mouth Daily., Disp: , Rfl:   •  atorvastatin (LIPITOR) 80 MG tablet, Take 1 tablet by mouth Daily., Disp: 90 tablet, Rfl: 0  •  clopidogrel (PLAVIX) 75 MG tablet, Take 1 tablet by mouth Daily., Disp: 90 tablet, Rfl: 0  •  metoprolol tartrate (LOPRESSOR) 50 MG tablet, Take 1 tablet by mouth 2 (Two) Times a Day., Disp: 180 tablet, Rfl: 0  •  mometasone-formoterol (DULERA 100) 100-5 MCG/ACT inhaler, Inhale 2 puffs 2 (Two) Times a Day., Disp: 1 each, Rfl: 5  •  nitroglycerin (NITROSTAT) 0.4 MG SL tablet, , Disp: , Rfl:   •  ONETOUCH DELICA LANCETS 33G misc, , Disp: , Rfl:   •  Oxymetazoline HCl (NASAL SPRAY NA), into the nostril(s) as directed by provider., Disp: , Rfl:   •  Stool Softener 100 MG capsule, , Disp: , Rfl:   •  terbinafine (lamiSIL) 250 MG tablet, Take 1 tablet by mouth Every Other Day As Needed (fungus)., Disp: 45 tablet, Rfl: 0    Allergies:   No Known Allergies    Objective     Vital Signs  /80 (BP Location: Left arm, Patient Position: Sitting, Cuff Size: Large Adult)   Pulse 69   Ht 177.8 cm (70\")   Wt 89.8 kg (198 lb)   SpO2 96%   BMI 28.41 kg/m²   Estimated body mass index is 28.41 kg/m² as calculated from the following:    Height as of this encounter: 177.8 cm (70\").    Weight as of this encounter: 89.8 kg (198 lb).          Physical Exam  Vitals and nursing note reviewed.   Constitutional:       Appearance: Normal appearance. He is obese.   HENT:      Head: Normocephalic.      Right Ear: External ear normal.      Left Ear: External ear normal.      Nose: Nose normal.   Eyes:      Pupils: Pupils are equal, round, and reactive to light. "   Cardiovascular:      Rate and Rhythm: Normal rate and regular rhythm.      Pulses: Normal pulses.      Heart sounds: Normal heart sounds.   Pulmonary:      Effort: Pulmonary effort is normal.      Breath sounds: Normal breath sounds.   Musculoskeletal:      Cervical back: Normal range of motion and neck supple.   Skin:     General: Skin is warm and dry.             Comments: The nail he hit on the bed frame, and lose it, started back in and for some reason and [place] near the nailbed saying its not growing but anyway he is right fifth toe he is got a fungus   Neurological:      Mental Status: He is alert.   Psychiatric:         Mood and Affect: Mood normal.          Procedures     Assessment and Plan     1. Male hypogonadism  Testosterone cypionate refilled x1 bottle equal to 10 cc it should provide him 10 weeks of therapy until he gets into see either the urologist or Dr. Duenas  - Testosterone Cypionate (DEPOTESTOTERONE CYPIONATE) 200 MG/ML injection; 1 ml every 10 days , im  Dispense: 10 mL; Refill: 0    2. Nail fungus  Have him take 90 days of Lamisil every other day due to his renal failure      4. Gastroesophageal reflux disease with esophagitis without hemorrhage  Controlled    5. Primary hypertension  Semicontrolled    6. Atherosclerosis of coronary artery of native heart without angina pectoris, unspecified vessel or lesion type  Living on borrowed time, smoking abuse    7. Degeneration of lumbosacral intervertebral disc  DDD, he has gotten off his methadone 100     8. Pulmonary emphysema, unspecified emphysema type (HCC)  Of his use rescue Hailer as needed and Spiriva twice daily as needed    9. Lumbar radiculopathy  Started out and right foot down gone to left, freezing  - gabapentin (NEURONTIN) 600 MG tablet; Take 1 tablet by mouth 4 (Four) Times a Day.  Dispense: 120 tablet; Refill: 5       Follow Up  Return stevan.    Jamie TORREZ [facility]  [address] [id number] [phone]

## 2023-05-04 ENCOUNTER — OFFICE VISIT (OUTPATIENT)
Dept: GASTROENTEROLOGY | Facility: CLINIC | Age: 58
End: 2023-05-04
Payer: COMMERCIAL

## 2023-05-04 VITALS
HEART RATE: 70 BPM | TEMPERATURE: 97.5 F | SYSTOLIC BLOOD PRESSURE: 156 MMHG | HEIGHT: 70 IN | WEIGHT: 221.8 LBS | OXYGEN SATURATION: 97 % | BODY MASS INDEX: 31.75 KG/M2 | DIASTOLIC BLOOD PRESSURE: 108 MMHG

## 2023-05-04 DIAGNOSIS — R74.8 ELEVATED ALKALINE PHOSPHATASE LEVEL: Primary | ICD-10-CM

## 2023-05-04 NOTE — PROGRESS NOTES
GASTROENTEROLOGY OFFICE NOTE    Ibrahima Martinez  [id number]  1965    CARE TEAM  Patient Care Team:  Jamie Kelly MD as PCP - General (Family Medicine)    Referring Provider: Yandy Cleveland PA-C    Chief Complaint   Patient presents with   • Elevated Hepatic Enzymes        HISTORY OF PRESENT ILLNESS:   Ibrahima Martinez is a 57 y.o. male as a referral from Ms. Cleveland regarding elevated alkaline phosphatase level.  Elevated alkaline phosphatase has been present since 4/12/2022.  He has a history of elevated AST and ALT approximately 1 year ago around the time he was admitted at  and had CABG x 6 with JUAN clipping 3/28/2022. Most recent CMP with normal AST and ALT.     He reports rare alcohol use. He reports his weight fluctuates between 198 and 220 pounds.     He was unsure of reason of appointment.  He thought he was coming to discuss testosterone and prostate.    He reports history of colonoscopy years ago per Dr. Arnold but mentions he has not had repeat colonoscopy around age 50-55.  Past Medical History:   Diagnosis Date   • Back problem    • Diabetes    • Heart disease    • Hypertension         Past Surgical History:   Procedure Laterality Date   • CARDIAC CATHETERIZATION     • CORONARY ARTERY BYPASS GRAFT  03/28/2022    [place]ima to LAD, reverse saphenous vein graft to 1st diagonal, reverse saphenous vein graft to ramus intermeduiusand reverse saphenous vein graft sequential to PDA then to first right posterior lateral branch artery, then to the second posterolateral artery (Dr Yogi Mcgregor   • CORONARY STENT PLACEMENT     • EPIDURAL BLOCK  2021    For right-sided sciatica pain.         Current Outpatient Medications on File Prior to Visit   Medication Sig   • Acetaminophen Extra Strength 500 MG tablet    • albuterol sulfate  (90 Base) MCG/ACT inhaler Inhale 2 puffs Every 4 (Four) Hours As Needed for Wheezing.   • aspirin 81 MG tablet Take  by mouth Daily.   • atorvastatin (LIPITOR) 80 MG tablet Take 1  "tablet by mouth Daily.   • clopidogrel (PLAVIX) 75 MG tablet Take 1 tablet by mouth Daily.   • gabapentin (NEURONTIN) 600 MG tablet Take 1 tablet by mouth 4 (Four) Times a Day.   • metoprolol tartrate (LOPRESSOR) 50 MG tablet Take 1 tablet by mouth 2 (Two) Times a Day.   • mometasone-formoterol (DULERA 100) 100-5 MCG/ACT inhaler Inhale 2 puffs 2 (Two) Times a Day.   • nitroglycerin (NITROSTAT) 0.4 MG SL tablet    • ONETOUCH DELICA LANCETS 33G misc    • Oxymetazoline HCl (NASAL SPRAY NA) into the nostril(s) as directed by provider.   • Stool Softener 100 MG capsule    • terbinafine (lamiSIL) 250 MG tablet Take 1 tablet by mouth Every Other Day As Needed (fungus).   • Testosterone Cypionate (DEPOTESTOTERONE CYPIONATE) 200 MG/ML injection 1 ml every 10 days , im   • tiotropium bromide monohydrate (SPIRIVA RESPIMAT) 2.5 MCG/ACT aerosol solution inhaler Inhale 2 puffs Daily.     No current facility-administered medications on file prior to visit.       No Known Allergies    Family History   Problem Relation Age of Onset   • Heart attack Father    • Hypertension Father    • Heart disease Father    • Arthritis Mother        Social History     Socioeconomic History   • Marital status:    Tobacco Use   • Smoking status: Former     Packs/day: 1.00     Years: 15.00     Pack years: 15.00     Types: Cigarettes   • Smokeless tobacco: Never   Vaping Use   • Vaping Use: Never used   Substance and Sexual Activity   • Alcohol use: No   • Drug use: Never   • Sexual activity: Defer       PHYSICAL EXAM   BP (!) 156/108 (BP Location: Left arm, Patient Position: Sitting, Cuff Size: Adult)   Pulse 70   Temp 97.5 °F (36.4 °C) (Infrared)   Ht 177.8 cm (70\")   Wt 101 kg (221 lb 12.8 oz)   SpO2 97%   BMI 31.82 kg/m²   Physical Exam  Constitutional:       General: He is not in acute distress.     Appearance: He is not toxic-appearing.   HENT:      Head: Normocephalic and atraumatic. No contusion.      Right Ear: External ear " normal.      Left Ear: External ear normal.   Eyes:      General: Lids are normal. No scleral icterus.        Right eye: No discharge.         Left eye: No discharge.      Extraocular Movements: Extraocular movements intact.   Neck:      Trachea: Trachea normal.      Comments: No visible mass  No visible adenopathy  Cardiovascular:      Rate and Rhythm: Normal rate.   Pulmonary:      Effort: No respiratory distress.      Comments: Symmetrical expansion    Musculoskeletal:      Comments: Symmetrical movement of upper extremities  Symmetrical movement of lower extremities  No visible deformities   Skin:     General: Skin is warm and dry.      Coloration: Skin is not jaundiced.   Neurological:      General: No focal deficit present.      Mental Status: He is alert and oriented to person, place, and time.   Psychiatric:         Mood and Affect: Mood normal.         Behavior: Behavior normal.         Thought Content: Thought content normal.     Results Review:    CMP        5/16/2022    09:44 3/6/2023    11:04   CMP   Glucose 167   184     BUN 22   34     Creatinine 1.80   1.79     Sodium 145   141     Potassium 4.7   4.8     Chloride 107   103     Calcium 8.9   9.5     Total Protein 7.0   7.3     Albumin 4.1   4.4     Globulin 2.9   2.9     Total Bilirubin 0.3   0.5     Alkaline Phosphatase 139   132     AST (SGOT) 11   14     ALT (SGPT) 13   18     BUN/Creatinine Ratio 12   19       CBC        5/16/2022    09:44 3/6/2023    11:04   CBC   WBC 8.7   8.8     RBC 4.41   4.98     Hemoglobin 12.6   15.9     Hematocrit 39.8   47.1     MCV 90   95     MCH 28.6   31.9     MCHC 31.7   33.8     RDW 13.9   13.1     Platelets 251   166         ASSESSMENT / PLAN  1. Elevated alkaline phosphatase level  - send for labs as below  - alkaline phosphatase is not specific to the hepatobiliary system and can be elevated for many different reasons   - recommend weight loss via dietary changes and exercise to determine if alkaline  phosphatase improves.   - if labs are abnormal, will discuss next step in evaluation which would likely be US of the liver.   - Gamma GT  - 5' Nucleotidase; Future  - Mitochondrial Antibodies, M2; Future  - Alkaline Phosphatase, Isoenzymes; Future    - we discussed screening colonoscopy but he did not want to schedule at this time. He may contact the office if he would like to schedule screening colonoscopy.     - recommend he consider discussing low testosterone with primary care office to determine if he is supposed to see urology      Return in about 3 months (around 8/4/2023).    Celina Rene, APRN  05/04/2023

## 2023-05-26 ENCOUNTER — OFFICE VISIT (OUTPATIENT)
Dept: FAMILY MEDICINE CLINIC | Facility: CLINIC | Age: 58
End: 2023-05-26
Payer: COMMERCIAL

## 2023-05-26 VITALS
WEIGHT: 223 LBS | OXYGEN SATURATION: 97 % | SYSTOLIC BLOOD PRESSURE: 160 MMHG | HEART RATE: 83 BPM | BODY MASS INDEX: 31.92 KG/M2 | HEIGHT: 70 IN | DIASTOLIC BLOOD PRESSURE: 80 MMHG

## 2023-05-26 DIAGNOSIS — I10 PRIMARY HYPERTENSION: ICD-10-CM

## 2023-05-26 DIAGNOSIS — I25.10 ATHEROSCLEROSIS OF CORONARY ARTERY OF NATIVE HEART WITHOUT ANGINA PECTORIS, UNSPECIFIED VESSEL OR LESION TYPE: Primary | ICD-10-CM

## 2023-05-26 DIAGNOSIS — E29.1 MALE HYPOGONADISM: ICD-10-CM

## 2023-05-26 PROCEDURE — 3044F HG A1C LEVEL LT 7.0%: CPT | Performed by: FAMILY MEDICINE

## 2023-05-26 PROCEDURE — 3077F SYST BP >= 140 MM HG: CPT | Performed by: FAMILY MEDICINE

## 2023-05-26 PROCEDURE — 99214 OFFICE O/P EST MOD 30 MIN: CPT | Performed by: FAMILY MEDICINE

## 2023-05-26 PROCEDURE — 3079F DIAST BP 80-89 MM HG: CPT | Performed by: FAMILY MEDICINE

## 2023-05-26 RX ORDER — METOPROLOL TARTRATE 50 MG/1
50 TABLET, FILM COATED ORAL 2 TIMES DAILY
Qty: 180 TABLET | Refills: 0 | Status: SHIPPED | OUTPATIENT
Start: 2023-05-26

## 2023-05-26 RX ORDER — CLOPIDOGREL BISULFATE 75 MG/1
75 TABLET ORAL DAILY
Qty: 90 TABLET | Refills: 0 | Status: SHIPPED | OUTPATIENT
Start: 2023-05-26

## 2023-05-26 RX ORDER — ATORVASTATIN CALCIUM 80 MG/1
80 TABLET, FILM COATED ORAL DAILY
Qty: 90 TABLET | Refills: 0 | Status: SHIPPED | OUTPATIENT
Start: 2023-05-26

## 2023-05-26 RX ORDER — TESTOSTERONE CYPIONATE 200 MG/ML
INJECTION, SOLUTION INTRAMUSCULAR
Qty: 10 ML | Refills: 0 | Status: SHIPPED | OUTPATIENT
Start: 2023-05-26

## 2023-05-27 LAB — TESTOST SERPL-MCNC: 965 NG/DL (ref 264–916)

## 2023-05-28 NOTE — PROGRESS NOTES
Follow Up Office Visit      Date of Visit:  2023   Patient Name: Ibrahima Martinez  : 1965   MRN: [id number]     Chief Complaint:    Chief Complaint   Patient presents with   • DISCUSS TESTOSTERONE       History of Present Illness: Ibrahima Martinez is a 58 y.o. male who is here today for follow up.  Checking appropriate labs today.  Patient with low testosterone.  Needs refills.  Patient also needs refills on his hypertension and CAD medication.  Conditions are stable.        Subjective      Review of Systems:   Review of Systems   Constitutional: Negative for fatigue and fever.   HENT: Negative for congestion and ear pain.    Respiratory: Negative for apnea, cough, chest tightness and shortness of breath.    Cardiovascular: Negative for chest pain.   Gastrointestinal: Negative for abdominal pain, constipation, diarrhea and nausea.   Musculoskeletal: Negative for arthralgias.   Psychiatric/Behavioral: Negative for depressed mood and stress.       Past Medical History:   Past Medical History:   Diagnosis Date   • Back problem    • Diabetes    • Heart disease    • Hypertension        Past Surgical History:   Past Surgical History:   Procedure Laterality Date   • CARDIAC CATHETERIZATION     • CORONARY ARTERY BYPASS GRAFT  2022    [place] to LAD, reverse saphenous vein graft to 1st diagonal, reverse saphenous vein graft to ramus intermeduiusand reverse saphenous vein graft sequential to PDA then to first right posterior lateral branch artery, then to the second posterolateral artery (Dr Yogi Mcgregor   • CORONARY STENT PLACEMENT     • EPIDURAL BLOCK      For right-sided sciatica pain.        Family History:   Family History   Problem Relation Age of Onset   • Heart attack Father    • Hypertension Father    • Heart disease Father    • Arthritis Mother        Social History:   Social History     Socioeconomic History   • Marital status:    Tobacco Use   • Smoking status: Former     Packs/day: 1.00      "Years: 15.00     Pack years: 15.00     Types: Cigarettes   • Smokeless tobacco: Never   Vaping Use   • Vaping Use: Never used   Substance and Sexual Activity   • Alcohol use: No   • Drug use: Never   • Sexual activity: Defer       Medications:     Current Outpatient Medications:   •  atorvastatin (LIPITOR) 80 MG tablet, Take 1 tablet by mouth Daily., Disp: 90 tablet, Rfl: 0  •  clopidogrel (PLAVIX) 75 MG tablet, Take 1 tablet by mouth Daily., Disp: 90 tablet, Rfl: 0  •  metoprolol tartrate (LOPRESSOR) 50 MG tablet, Take 1 tablet by mouth 2 (Two) Times a Day., Disp: 180 tablet, Rfl: 0  •  Testosterone Cypionate (DEPOTESTOTERONE CYPIONATE) 200 MG/ML injection, 1 ml every 10 days , im, Disp: 10 mL, Rfl: 0  •  Acetaminophen Extra Strength 500 MG tablet, , Disp: , Rfl:   •  albuterol sulfate  (90 Base) MCG/ACT inhaler, Inhale 2 puffs Every 4 (Four) Hours As Needed for Wheezing., Disp: 18 g, Rfl: 2  •  aspirin 81 MG tablet, Take  by mouth Daily., Disp: , Rfl:   •  gabapentin (NEURONTIN) 600 MG tablet, Take 1 tablet by mouth 4 (Four) Times a Day., Disp: 120 tablet, Rfl: 5  •  mometasone-formoterol (DULERA 100) 100-5 MCG/ACT inhaler, Inhale 2 puffs 2 (Two) Times a Day., Disp: 1 each, Rfl: 5  •  nitroglycerin (NITROSTAT) 0.4 MG SL tablet, , Disp: , Rfl:   •  ONETOUCH DELICA LANCETS 33G misc, , Disp: , Rfl:   •  Oxymetazoline HCl (NASAL SPRAY NA), into the nostril(s) as directed by provider., Disp: , Rfl:   •  Stool Softener 100 MG capsule, , Disp: , Rfl:   •  terbinafine (lamiSIL) 250 MG tablet, Take 1 tablet by mouth Every Other Day As Needed (fungus)., Disp: 45 tablet, Rfl: 0  •  tiotropium bromide monohydrate (SPIRIVA RESPIMAT) 2.5 MCG/ACT aerosol solution inhaler, Inhale 2 puffs Daily., Disp: 1 each, Rfl: 5    Allergies:   No Known Allergies    Objective     Physical Exam:  Vital Signs:   Vitals:    05/26/23 0904   BP: 160/80   Pulse: 83   SpO2: 97%   Weight: 101 kg (223 lb)   Height: 177.8 cm (70\")     Body mass " index is 32 kg/m².     Physical Exam  Vitals and nursing note reviewed.   Constitutional:       General: He is not in acute distress.     Appearance: Normal appearance. He is not ill-appearing.   HENT:      Head: Normocephalic and atraumatic.      Right Ear: Tympanic membrane and ear canal normal.      Left Ear: Tympanic membrane and ear canal normal.      Nose: Nose normal.   Cardiovascular:      Rate and Rhythm: Normal rate and regular rhythm.      Heart sounds: Normal heart sounds.   Pulmonary:      Effort: Pulmonary effort is normal.      Breath sounds: Normal breath sounds.   Neurological:      Mental Status: He is alert and oriented to person, place, and time. Mental status is at baseline.   Psychiatric:         Mood and Affect: Mood normal.         Procedures      Assessment / Plan      Assessment/Plan:   Diagnoses and all orders for this visit:    1. Atherosclerosis of coronary artery of native heart without angina pectoris, unspecified vessel or lesion type (Primary)  -     clopidogrel (PLAVIX) 75 MG tablet; Take 1 tablet by mouth Daily.  Dispense: 90 tablet; Refill: 0  -     atorvastatin (LIPITOR) 80 MG tablet; Take 1 tablet by mouth Daily.  Dispense: 90 tablet; Refill: 0  -     metoprolol tartrate (LOPRESSOR) 50 MG tablet; Take 1 tablet by mouth 2 (Two) Times a Day.  Dispense: 180 tablet; Refill: 0    2. Male hypogonadism  -     Cancel: Testosterone; Future  -     Testosterone Cypionate (DEPOTESTOTERONE CYPIONATE) 200 MG/ML injection; 1 ml every 10 days , im  Dispense: 10 mL; Refill: 0  -     Testosterone    3. Primary hypertension  -     clopidogrel (PLAVIX) 75 MG tablet; Take 1 tablet by mouth Daily.  Dispense: 90 tablet; Refill: 0  -     atorvastatin (LIPITOR) 80 MG tablet; Take 1 tablet by mouth Daily.  Dispense: 90 tablet; Refill: 0  -     metoprolol tartrate (LOPRESSOR) 50 MG tablet; Take 1 tablet by mouth 2 (Two) Times a Day.  Dispense: 180 tablet; Refill: 0         Continue current medication for  his CAD and hypertension.  Continue his testosterone replacement.  He has come in regularly for his appointments and labs.  He also will need a refill of gabapentin fairly soon.  He will call and needs refills.    Follow Up:   No follow-ups on file.    Chavo Duenas  [facility]

## 2023-05-31 ENCOUNTER — TELEPHONE (OUTPATIENT)
Dept: FAMILY MEDICINE CLINIC | Facility: CLINIC | Age: 58
End: 2023-05-31

## 2023-05-31 DIAGNOSIS — E29.1 MALE HYPOGONADISM: ICD-10-CM

## 2023-05-31 NOTE — TELEPHONE ENCOUNTER
Caller: Ibrahima Martinez    Relationship: Self    Best call back number: [phone]   What test was performed: LAB WORK    When was the test performed: 05.26.23  Where was the test performed: IN OFFICE    Additional notes: CALL WITH RESULTS

## 2023-06-01 RX ORDER — SILDENAFIL 100 MG/1
100 TABLET, FILM COATED ORAL DAILY PRN
Qty: 30 TABLET | Refills: 1 | Status: SHIPPED | OUTPATIENT
Start: 2023-06-01

## 2023-06-01 RX ORDER — TESTOSTERONE CYPIONATE 200 MG/ML
INJECTION, SOLUTION INTRAMUSCULAR
Qty: 10 ML | Refills: 1 | Status: SHIPPED | OUTPATIENT
Start: 2023-06-01

## 2023-06-01 NOTE — TELEPHONE ENCOUNTER
Called pt to let know and pt states he is going out of town with his wife for 13 weeks. Can you please call in more Testerone and pt states you were going to call in YouTern or Viagra as well . Thank you

## 2023-06-01 NOTE — TELEPHONE ENCOUNTER
I did a letter.   May not have got to him yet.   Read the plan to him at the bottom of the letter.

## 2023-08-28 DIAGNOSIS — M54.16 LUMBAR RADICULOPATHY: ICD-10-CM

## 2023-08-28 DIAGNOSIS — I10 PRIMARY HYPERTENSION: ICD-10-CM

## 2023-08-28 DIAGNOSIS — I25.10 ATHEROSCLEROSIS OF CORONARY ARTERY OF NATIVE HEART WITHOUT ANGINA PECTORIS, UNSPECIFIED VESSEL OR LESION TYPE: ICD-10-CM

## 2023-08-28 RX ORDER — FUROSEMIDE 40 MG/1
40 TABLET ORAL DAILY
COMMUNITY
End: 2023-08-28 | Stop reason: SDUPTHER

## 2023-08-28 RX ORDER — CLOPIDOGREL BISULFATE 75 MG/1
75 TABLET ORAL DAILY
Qty: 90 TABLET | Refills: 0 | Status: SHIPPED | OUTPATIENT
Start: 2023-08-28

## 2023-08-28 RX ORDER — ATORVASTATIN CALCIUM 80 MG/1
80 TABLET, FILM COATED ORAL DAILY
Qty: 90 TABLET | Refills: 0 | Status: SHIPPED | OUTPATIENT
Start: 2023-08-28

## 2023-08-28 RX ORDER — LISINOPRIL 20 MG/1
20 TABLET ORAL DAILY
COMMUNITY
End: 2023-08-28 | Stop reason: SDUPTHER

## 2023-08-28 RX ORDER — METOPROLOL TARTRATE 50 MG/1
50 TABLET, FILM COATED ORAL 2 TIMES DAILY
Qty: 180 TABLET | Refills: 0 | Status: SHIPPED | OUTPATIENT
Start: 2023-08-28

## 2023-08-28 RX ORDER — LISINOPRIL 20 MG/1
20 TABLET ORAL DAILY
Qty: 90 TABLET | Refills: 1 | Status: SHIPPED | OUTPATIENT
Start: 2023-08-28

## 2023-08-28 RX ORDER — GABAPENTIN 600 MG/1
600 TABLET ORAL 4 TIMES DAILY
Qty: 120 TABLET | Refills: 5 | Status: SHIPPED | OUTPATIENT
Start: 2023-08-28

## 2023-08-28 RX ORDER — FUROSEMIDE 40 MG/1
40 TABLET ORAL DAILY
Qty: 120 TABLET | Refills: 3 | Status: SHIPPED | OUTPATIENT
Start: 2023-08-28

## 2023-08-28 NOTE — TELEPHONE ENCOUNTER
Rx Refill Note  Requested Prescriptions     Pending Prescriptions Disp Refills    clopidogrel (PLAVIX) 75 MG tablet 90 tablet 0     Sig: Take 1 tablet by mouth Daily.    metoprolol tartrate (LOPRESSOR) 50 MG tablet 180 tablet 0     Sig: Take 1 tablet by mouth 2 (Two) Times a Day.    atorvastatin (LIPITOR) 80 MG tablet 90 tablet 0     Sig: Take 1 tablet by mouth Daily.    furosemide (LASIX) 40 MG tablet       Sig: Take 1 tablet by mouth Daily.    gabapentin (NEURONTIN) 600 MG tablet 120 tablet 5     Sig: Take 1 tablet by mouth 4 (Four) Times a Day.    lisinopril (PRINIVIL,ZESTRIL) 20 MG tablet       Sig: Take 1 tablet by mouth Daily.      Last office visit with prescribing clinician: 5/26/2023   Last telemedicine visit with prescribing clinician: Visit date not found   Next office visit with prescribing clinician: Visit date not found                         Would you like a call back once the refill request has been completed: [] Yes [] No    If the office needs to give you a call back, can they leave a voicemail: [] Yes [] No    Elma Loco MA  08/28/23, 16:24 EDT

## 2023-08-28 NOTE — TELEPHONE ENCOUNTER
Caller: JULIANNA HOLMAN    Relationship: Spouse    Best call back number:[phone]     Requested Prescriptions:   Requested Prescriptions     Pending Prescriptions Disp Refills    clopidogrel (PLAVIX) 75 MG tablet 90 tablet 0     Sig: Take 1 tablet by mouth Daily.    metoprolol tartrate (LOPRESSOR) 50 MG tablet 180 tablet 0     Sig: Take 1 tablet by mouth 2 (Two) Times a Day.    atorvastatin (LIPITOR) 80 MG tablet 90 tablet 0     Sig: Take 1 tablet by mouth Daily.    furosemide (LASIX) 40 MG tablet       Sig: Take 1 tablet by mouth Daily.    gabapentin (NEURONTIN) 600 MG tablet 120 tablet 5     Sig: Take 1 tablet by mouth 4 (Four) Times a Day.    lisinopril (PRINIVIL,ZESTRIL) 20 MG tablet       Sig: Take 1 tablet by mouth Daily.        [facility] [address] [phone] [facility] [phone] [facility]     Last office visit with prescribing clinician: 5/26/2023   Last telemedicine visit with prescribing clinician: Visit date not found   Next office visit with prescribing clinician: Visit date not found     Additional details provided by patient: OUT OF MEDICATION     Does the patient have less than a 3 day supply:  [x] Yes  [] No    Would you like a call back once the refill request has been completed: [] Yes [x] No    If the office needs to give you a call back, can they leave a voicemail: [] Yes [x] No    King Damon   08/28/23 12:42 EDT

## 2023-10-09 DIAGNOSIS — M54.16 LUMBAR RADICULOPATHY: ICD-10-CM

## 2023-10-10 RX ORDER — GABAPENTIN 600 MG/1
600 TABLET ORAL 4 TIMES DAILY
Qty: 120 TABLET | Refills: 4 | Status: SHIPPED | OUTPATIENT
Start: 2023-10-10

## 2023-10-11 ENCOUNTER — OFFICE VISIT (OUTPATIENT)
Dept: FAMILY MEDICINE CLINIC | Facility: CLINIC | Age: 58
End: 2023-10-11
Payer: COMMERCIAL

## 2023-10-11 VITALS
BODY MASS INDEX: 21.78 KG/M2 | OXYGEN SATURATION: 97 % | HEART RATE: 72 BPM | HEIGHT: 70 IN | DIASTOLIC BLOOD PRESSURE: 88 MMHG | WEIGHT: 152.13 LBS | SYSTOLIC BLOOD PRESSURE: 136 MMHG

## 2023-10-11 DIAGNOSIS — I25.10 ATHEROSCLEROSIS OF CORONARY ARTERY OF NATIVE HEART WITHOUT ANGINA PECTORIS, UNSPECIFIED VESSEL OR LESION TYPE: ICD-10-CM

## 2023-10-11 DIAGNOSIS — I10 PRIMARY HYPERTENSION: Primary | ICD-10-CM

## 2023-10-11 DIAGNOSIS — M17.12 PRIMARY OSTEOARTHRITIS OF LEFT KNEE: ICD-10-CM

## 2023-10-11 DIAGNOSIS — E29.1 MALE HYPOGONADISM: ICD-10-CM

## 2023-10-11 DIAGNOSIS — J43.9 PULMONARY EMPHYSEMA, UNSPECIFIED EMPHYSEMA TYPE: ICD-10-CM

## 2023-10-11 RX ORDER — CLOPIDOGREL BISULFATE 75 MG/1
75 TABLET ORAL DAILY
Qty: 90 TABLET | Refills: 1 | Status: SHIPPED | OUTPATIENT
Start: 2023-10-11

## 2023-10-11 RX ORDER — METOPROLOL TARTRATE 50 MG/1
50 TABLET, FILM COATED ORAL 2 TIMES DAILY
Qty: 180 TABLET | Refills: 1 | Status: SHIPPED | OUTPATIENT
Start: 2023-10-11

## 2023-10-11 RX ORDER — ATORVASTATIN CALCIUM 80 MG/1
80 TABLET, FILM COATED ORAL DAILY
Qty: 90 TABLET | Refills: 1 | Status: SHIPPED | OUTPATIENT
Start: 2023-10-11

## 2023-10-11 RX ORDER — TESTOSTERONE CYPIONATE 200 MG/ML
INJECTION, SOLUTION INTRAMUSCULAR
Qty: 10 ML | Refills: 1 | Status: SHIPPED | OUTPATIENT
Start: 2023-10-11

## 2023-10-12 RX ORDER — TRIAMCINOLONE ACETONIDE 40 MG/ML
40 INJECTION, SUSPENSION INTRA-ARTICULAR; INTRAMUSCULAR
Status: COMPLETED | OUTPATIENT
Start: 2023-10-12 | End: 2023-10-12

## 2023-10-12 RX ADMIN — TRIAMCINOLONE ACETONIDE 40 MG: 40 INJECTION, SUSPENSION INTRA-ARTICULAR; INTRAMUSCULAR at 07:07

## 2023-10-12 NOTE — PROGRESS NOTES
Follow Up Office Visit      Date of Visit:  10/11/2023   Patient Name: Ibrahima Martinez  : 1965   MRN: [id number]     Chief Complaint:    Chief Complaint   Patient presents with    Right Hip Pain    Left Knee Pan       History of Present Illness: Ibrahima Martinez is a 58 y.o. male who is here today for follow up.  Patient can in today for a follow-up on osteoarthritis as well as recheck on hypertension hypogonadism emphysema and CAD.  Arthritis worse.  Other conditions stable.  Patient due for blood work.  Patient due for medication refills.        Subjective      Review of Systems:   Review of Systems   Constitutional:  Negative for fatigue and fever.   HENT:  Negative for congestion and ear pain.    Respiratory:  Negative for apnea, cough, chest tightness and shortness of breath.    Cardiovascular:  Negative for chest pain.   Gastrointestinal:  Negative for abdominal pain, constipation, diarrhea and nausea.   Musculoskeletal:  Positive for arthralgias.   Psychiatric/Behavioral:  Negative for depressed mood and stress.        Past Medical History:   Past Medical History:   Diagnosis Date    Back problem     Diabetes     Heart disease     Hypertension        Past Surgical History:   Past Surgical History:   Procedure Laterality Date    CARDIAC CATHETERIZATION      CORONARY ARTERY BYPASS GRAFT  2022    Lima to LAD, reverse saphenous vein graft to 1st diagonal, reverse saphenous vein graft to ramus intermeduiusand reverse saphenous vein graft sequential to PDA then to first right posterior lateral branch artery, then to the second posterolateral artery (Dr Yogi Mcgregor    CORONARY STENT PLACEMENT      EPIDURAL BLOCK      For right-sided sciatica pain.        Family History:   Family History   Problem Relation Age of Onset    Heart attack Father     Hypertension Father     Heart disease Father     Arthritis Mother        Social History:   Social History     Socioeconomic History    Marital status:     Tobacco Use    Smoking status: Former     Packs/day: 1.00     Years: 15.00     Additional pack years: 0.00     Total pack years: 15.00     Types: Cigarettes    Smokeless tobacco: Never   Vaping Use    Vaping Use: Never used   Substance and Sexual Activity    Alcohol use: No    Drug use: Never    Sexual activity: Defer       Medications:     Current Outpatient Medications:     Acetaminophen Extra Strength 500 MG tablet, , Disp: , Rfl:     albuterol sulfate  (90 Base) MCG/ACT inhaler, Inhale 2 puffs Every 4 (Four) Hours As Needed for Wheezing., Disp: 18 g, Rfl: 2    aspirin 81 MG tablet, Take  by mouth Daily., Disp: , Rfl:     atorvastatin (LIPITOR) 80 MG tablet, Take 1 tablet by mouth Daily., Disp: 90 tablet, Rfl: 1    clopidogrel (PLAVIX) 75 MG tablet, Take 1 tablet by mouth Daily., Disp: 90 tablet, Rfl: 1    furosemide (LASIX) 40 MG tablet, Take 1 tablet by mouth Daily., Disp: 120 tablet, Rfl: 3    gabapentin (NEURONTIN) 600 MG tablet, TAKE 1 TABLET BY MOUTH FOUR TIMES DAILY, Disp: 120 tablet, Rfl: 4    metoprolol tartrate (LOPRESSOR) 50 MG tablet, Take 1 tablet by mouth 2 (Two) Times a Day., Disp: 180 tablet, Rfl: 1    mometasone-formoterol (DULERA 100) 100-5 MCG/ACT inhaler, Inhale 2 puffs 2 (Two) Times a Day., Disp: 1 each, Rfl: 5    nitroglycerin (NITROSTAT) 0.4 MG SL tablet, , Disp: , Rfl:     ONETOUCH DELICA LANCETS 33G misc, , Disp: , Rfl:     Oxymetazoline HCl (NASAL SPRAY NA), into the nostril(s) as directed by provider., Disp: , Rfl:     sildenafil (Viagra) 100 MG tablet, Take 1 tablet by mouth Daily As Needed for Erectile Dysfunction., Disp: 30 tablet, Rfl: 1    Stool Softener 100 MG capsule, , Disp: , Rfl:     Testosterone Cypionate (DEPOTESTOTERONE CYPIONATE) 200 MG/ML injection, 1 ml every 14 days , im, Disp: 10 mL, Rfl: 1    tiotropium bromide monohydrate (SPIRIVA RESPIMAT) 2.5 MCG/ACT aerosol solution inhaler, Inhale 2 puffs Daily., Disp: 1 each, Rfl: 5    Allergies:   No Known  "Allergies    Objective     Physical Exam:  Vital Signs:   Vitals:    10/11/23 1422   BP: 136/88   Pulse: 72   SpO2: 97%   Weight: 69 kg (152 lb 2 oz)   Height: 177.8 cm (70\")     Body mass index is 21.83 kg/mý.     Physical Exam  Vitals and nursing note reviewed.   Constitutional:       General: He is not in acute distress.     Appearance: Normal appearance. He is not ill-appearing.   HENT:      Head: Normocephalic and atraumatic.      Right Ear: Tympanic membrane and ear canal normal.      Left Ear: Tympanic membrane and ear canal normal.      Nose: Nose normal.   Cardiovascular:      Rate and Rhythm: Normal rate and regular rhythm.      Heart sounds: Normal heart sounds.   Pulmonary:      Effort: Pulmonary effort is normal.      Breath sounds: Normal breath sounds.   Neurological:      Mental Status: He is alert and oriented to person, place, and time. Mental status is at baseline.   Psychiatric:         Mood and Affect: Mood normal.         Arthrocentesis    Date/Time: 10/12/2023 7:07 AM    Performed by: Chavo Duenas MD  Authorized by: Chavo Duenas MD  Indications: pain   Body area: knee  Joint: left knee  Local anesthesia used: yes  Anesthesia: local infiltration    Anesthesia:  Local anesthesia used: yes  Local Anesthetic: lidocaine 1% without epinephrine  Anesthetic total: 3 mL    Sedation:  Patient sedated: no    Preparation: Patient was prepped and draped in the usual sterile fashion.  Needle size: 22 G  Ultrasound guidance: no  Approach: medial  Meds administered: 40 mg triamcinolone acetonide 40 MG/ML  Patient tolerance: patient tolerated the procedure well with no immediate complications            Assessment / Plan      Assessment/Plan:   Diagnoses and all orders for this visit:    1. Primary hypertension (Primary)  -     CBC Auto Differential; Future  -     Comprehensive Metabolic Panel; Future  -     Lipid Panel; Future  -     TSH; Future  -     metoprolol tartrate (LOPRESSOR) 50 MG " tablet; Take 1 tablet by mouth 2 (Two) Times a Day.  Dispense: 180 tablet; Refill: 1  -     clopidogrel (PLAVIX) 75 MG tablet; Take 1 tablet by mouth Daily.  Dispense: 90 tablet; Refill: 1  -     atorvastatin (LIPITOR) 80 MG tablet; Take 1 tablet by mouth Daily.  Dispense: 90 tablet; Refill: 1    2. Male hypogonadism  -     Testosterone; Future  -     Testosterone Cypionate (DEPOTESTOTERONE CYPIONATE) 200 MG/ML injection; 1 ml every 14 days , im  Dispense: 10 mL; Refill: 1    3. Atherosclerosis of coronary artery of native heart without angina pectoris, unspecified vessel or lesion type  -     metoprolol tartrate (LOPRESSOR) 50 MG tablet; Take 1 tablet by mouth 2 (Two) Times a Day.  Dispense: 180 tablet; Refill: 1  -     clopidogrel (PLAVIX) 75 MG tablet; Take 1 tablet by mouth Daily.  Dispense: 90 tablet; Refill: 1  -     atorvastatin (LIPITOR) 80 MG tablet; Take 1 tablet by mouth Daily.  Dispense: 90 tablet; Refill: 1    4. Pulmonary emphysema, unspecified emphysema type  -     tiotropium bromide monohydrate (SPIRIVA RESPIMAT) 2.5 MCG/ACT aerosol solution inhaler; Inhale 2 puffs Daily.  Dispense: 1 each; Refill: 5    Other orders  -     Arthrocentesis         Placed orders for future labs.  Refill chronic medication.  Rechecking testosterone.  Injection in the left knee today.    Follow Up:   No follow-ups on file.    Chavo Duenas  [facility]

## 2023-10-18 ENCOUNTER — TELEPHONE (OUTPATIENT)
Dept: FAMILY MEDICINE CLINIC | Facility: CLINIC | Age: 58
End: 2023-10-18
Payer: COMMERCIAL

## 2023-10-18 DIAGNOSIS — M25.559 HIP PAIN, UNSPECIFIED LATERALITY: Primary | ICD-10-CM

## 2023-10-18 NOTE — TELEPHONE ENCOUNTER
Caller: Ibrahima Martinez    Relationship: Self    Best call back number: [phone]     What medication are you requesting: RIGHT HIP INJECTION DISCUSSED AT LAST VISIT AND PRESCRIPTION FOR LEGS     What are your current symptoms: RESTLESS LEGS AND LACK OF SLEEP     If a prescription is needed, what is your preferred pharmacy and phone number: [facility] [address] - [phone]  - [phone] FX     Additional notes: PATIENT WAS LAST SEEN AND DISCUSSED AN INJECTION IN HIS HIP THAT HE IS WANTING TO GET SCHEDULED.     PATIENT STATES HE IS EXPERIENCING RESTLESS LEGS AND IS REQUESTING A PRESCRIPTION TO HELP.     PLEASE CALL PATIENT BACK, IF NO ANSWER LEAVE A DETAILED MESSAGE.

## 2023-10-21 RX ORDER — ROPINIROLE 0.25 MG/1
0.25 TABLET, FILM COATED ORAL NIGHTLY
Qty: 30 TABLET | Refills: 1 | Status: SHIPPED | OUTPATIENT
Start: 2023-10-21

## 2023-10-25 NOTE — TELEPHONE ENCOUNTER
HUB TO RELAY: LVM to advise: Placed referral for Ortho.  Gave ropinirole for restless leg syndrome. Advised pt to call back for any questions or concerns.

## 2023-10-31 ENCOUNTER — OFFICE VISIT (OUTPATIENT)
Dept: ORTHOPEDIC SURGERY | Facility: CLINIC | Age: 58
End: 2023-10-31
Payer: COMMERCIAL

## 2023-10-31 VITALS
SYSTOLIC BLOOD PRESSURE: 164 MMHG | WEIGHT: 152.12 LBS | DIASTOLIC BLOOD PRESSURE: 94 MMHG | BODY MASS INDEX: 21.78 KG/M2 | HEIGHT: 70 IN

## 2023-10-31 DIAGNOSIS — M76.01 GLUTEAL TENDINITIS OF RIGHT BUTTOCK: ICD-10-CM

## 2023-10-31 DIAGNOSIS — M25.551 RIGHT HIP PAIN: Primary | ICD-10-CM

## 2023-10-31 DIAGNOSIS — M70.61 GREATER TROCHANTERIC BURSITIS OF RIGHT HIP: ICD-10-CM

## 2023-10-31 RX ORDER — BUPIVACAINE HYDROCHLORIDE 5 MG/ML
4 INJECTION, SOLUTION EPIDURAL; INTRACAUDAL
Status: COMPLETED | OUTPATIENT
Start: 2023-10-31 | End: 2023-10-31

## 2023-10-31 RX ORDER — TRIAMCINOLONE ACETONIDE 40 MG/ML
2 INJECTION, SUSPENSION INTRA-ARTICULAR; INTRAMUSCULAR
Status: COMPLETED | OUTPATIENT
Start: 2023-10-31 | End: 2023-10-31

## 2023-10-31 RX ORDER — LIDOCAINE HYDROCHLORIDE 20 MG/ML
4 INJECTION, SOLUTION INFILTRATION; PERINEURAL
Status: COMPLETED | OUTPATIENT
Start: 2023-10-31 | End: 2023-10-31

## 2023-10-31 RX ADMIN — BUPIVACAINE HYDROCHLORIDE 4 ML: 5 INJECTION, SOLUTION EPIDURAL; INTRACAUDAL at 13:25

## 2023-10-31 RX ADMIN — TRIAMCINOLONE ACETONIDE 2 ML: 40 INJECTION, SUSPENSION INTRA-ARTICULAR; INTRAMUSCULAR at 13:25

## 2023-10-31 RX ADMIN — LIDOCAINE HYDROCHLORIDE 4 ML: 20 INJECTION, SOLUTION INFILTRATION; PERINEURAL at 13:25

## 2023-10-31 NOTE — PROGRESS NOTES
[facility] [place] [facility] Office Visit     Office Visit       Date: 10/31/2023   Patient Name: Ibrahima Martinez  MRN: [id number]  YOB: 1965    Referring Physician: Chavo Duenas MD     Chief Complaint:   Chief Complaint   Patient presents with    Right Hip - Pain       History of Present Illness:   Ibrahima Martinez is a 58 y.o. male who presents with right hip pain for 10 year(s). Onset atraumatic and gradual in nature. Pain is localized to gluteal region and is a 8/10 on the pain scale.Pain is described as stabbing and shooting. Associated symptoms include pain.  The pain is worse with walking, standing, sitting, climbing stairs, sleeping, working, and lying on affected side; pain medication and/or NSAID improve the pain. Previous treatments have included: cane/walker since symptom onset. Although some transient relief was reported with these interventions, these conservative measures have failed and symptoms have persisted. The patient is limited in daily activities and has had a significant decrease in quality of life as a result. He denies fevers, chills, or constitutional symptoms.    Subjective   Review of Systems: Review of Systems   Constitutional:  Negative for chills, fever, unexpected weight gain and unexpected weight loss.   HENT:  Negative for congestion, postnasal drip and rhinorrhea.    Eyes:  Negative for blurred vision.   Respiratory:  Negative for shortness of breath.    Cardiovascular:  Negative for leg swelling.   Gastrointestinal:  Negative for abdominal pain, nausea and vomiting.   Genitourinary:  Negative for difficulty urinating.   Musculoskeletal:  Positive for arthralgias. Negative for gait problem, joint swelling and myalgias.   Skin:  Negative for skin lesions and wound.   Neurological:  Negative for dizziness, weakness, light-headedness and numbness.   Hematological:  Does not bruise/bleed easily.   Psychiatric/Behavioral:  Negative for  depressed mood.         I have reviewed the following portions of the patient's history:History of Present Illness and review of systems.    Past Medical History:   Past Medical History:   Diagnosis Date    Back problem     Diabetes     Heart disease     Hypertension        Past Surgical History:   Past Surgical History:   Procedure Laterality Date    CARDIAC CATHETERIZATION      CORONARY ARTERY BYPASS GRAFT  03/28/2022    [place]ima to LAD, reverse saphenous vein graft to 1st diagonal, reverse saphenous vein graft to ramus intermeduiusand reverse saphenous vein graft sequential to PDA then to first right posterior lateral branch artery, then to the second posterolateral artery (Dr Yogi Mcgregor    CORONARY STENT PLACEMENT      EPIDURAL BLOCK  2021    For right-sided sciatica pain.        Family History:   Family History   Problem Relation Age of Onset    Heart attack Father     Hypertension Father     Heart disease Father     Arthritis Mother        Social History:   Social History     Socioeconomic History    Marital status:    Tobacco Use    Smoking status: Former     Packs/day: 1.00     Years: 15.00     Additional pack years: 0.00     Total pack years: 15.00     Types: Cigarettes    Smokeless tobacco: Never   Vaping Use    Vaping Use: Never used   Substance and Sexual Activity    Alcohol use: No    Drug use: Never    Sexual activity: Defer       Medications:   Current Outpatient Medications:     Acetaminophen Extra Strength 500 MG tablet, , Disp: , Rfl:     albuterol sulfate  (90 Base) MCG/ACT inhaler, Inhale 2 puffs Every 4 (Four) Hours As Needed for Wheezing., Disp: 18 g, Rfl: 2    aspirin 81 MG tablet, Take  by mouth Daily., Disp: , Rfl:     atorvastatin (LIPITOR) 80 MG tablet, Take 1 tablet by mouth Daily., Disp: 90 tablet, Rfl: 1    clopidogrel (PLAVIX) 75 MG tablet, Take 1 tablet by mouth Daily., Disp: 90 tablet, Rfl: 1    furosemide (LASIX) 40 MG tablet, Take 1 tablet by mouth Daily., Disp: 120  "tablet, Rfl: 3    gabapentin (NEURONTIN) 600 MG tablet, TAKE 1 TABLET BY MOUTH FOUR TIMES DAILY, Disp: 120 tablet, Rfl: 4    metoprolol tartrate (LOPRESSOR) 50 MG tablet, Take 1 tablet by mouth 2 (Two) Times a Day., Disp: 180 tablet, Rfl: 1    mometasone-formoterol (DULERA 100) 100-5 MCG/ACT inhaler, Inhale 2 puffs 2 (Two) Times a Day., Disp: 1 each, Rfl: 5    nitroglycerin (NITROSTAT) 0.4 MG SL tablet, , Disp: , Rfl:     ONETOUCH DELICA LANCETS 33G misc, , Disp: , Rfl:     Oxymetazoline HCl (NASAL SPRAY NA), into the nostril(s) as directed by provider., Disp: , Rfl:     rOPINIRole (REQUIP) 0.25 MG tablet, Take 1 tablet by mouth Every Night. Take 1 hour before bedtime., Disp: 30 tablet, Rfl: 1    sildenafil (Viagra) 100 MG tablet, Take 1 tablet by mouth Daily As Needed for Erectile Dysfunction., Disp: 30 tablet, Rfl: 1    Stool Softener 100 MG capsule, , Disp: , Rfl:     Testosterone Cypionate (DEPOTESTOTERONE CYPIONATE) 200 MG/ML injection, 1 ml every 14 days , im, Disp: 10 mL, Rfl: 1    tiotropium bromide monohydrate (SPIRIVA RESPIMAT) 2.5 MCG/ACT aerosol solution inhaler, Inhale 2 puffs Daily., Disp: 1 each, Rfl: 5    Allergies: No Known Allergies    I reviewed the patient's chief complaint, history of present illness, review of systems, past medical history, surgical history, family history, social history, medications and allergy list.     Objective    Vital Signs:   Vitals:    10/31/23 1300   BP: 164/94   Weight: 69 kg (152 lb 1.9 oz)   Height: 177.8 cm (70\")     Body mass index is 21.83 kg/m².   BMI is within normal parameters. No other follow-up for BMI required.     Patient reports that he is a former smoker. He quit smoking in and has not resumed smoking since that time.  This behavior was applauded and she was encouraged to continue in smoking cessation.  We will continue to monitor at subsequent visits.    Ortho Exam:  Constitutional: General Appearance: healthy-appearing and NAD.   Psychiatric: " Orientation: oriented to time, place, and person. Mood and Affect: normal affect and mood and active and alert.   Gait and Station: Appearance: ambulating with no assistive devices and limp.   Hip/Pelvis Appearance: Inspection: normal axial alignment and pelvis level.   Hips: Bony Palpation Right: tenderness of the greater trochanter. Bony Palpation Left: no tenderness of the iliac crest, the ASIS, the PSIS, the pubic tubercle, the sciatic notch, the ischial tuberosity, the SI joint, or the greater trochanter. Passive Range of Motion Right: no flexion contracture, hamstring tightness popliteal angle, or pain with motion and normal, flexion normal, extension normal, internal rotation normal, and external rotation normal. Passive Range of Motion Left: no flexion contracture, hamstring tightness popliteal angle, or pain with motion and normal, flexion normal, extension normal, internal rotation normal, and external rotation normal. Strength Right: normal 5/5. Strength Left: normal 5/5.   Skin: Right Lower Extremity: normal. Left Lower Extremity: normal.   right hip exam:   Normal hip contour without evidence of ecchymosis or swelling.   Range of motion of the hip shows pain only is exacerbated with Mac test, Straight Leg Raise.   Negative log roll, FADIR.   Hip flexion 110°, internal rotation 10°, external rotation 60°.   Tenderness to palpation greatest at the greater trochanter region.   Mild tenderness along the iliotibial band.   Mild tenderness at the gluteal region.   Negative tenderness at the ischial tuberosity.   Negative SI joint tenderness to palpation.  left hip exam:   Normal hip contour without evidence of swelling or echymosis.   Full range of motion without exacerbation of pain.   Negative tenderness to palpation throughout.    Results Review:   Imaging Results (Last 24 Hours)       Procedure Component Value Units Date/Time    XR Hip With or Without Pelvis 2 - 3 View Right [[id number]] Resulted:  10/31/23 1254     Updated: 10/31/23 1300        I personally reviewed the radiographs of the right hip from 10/31/2023.  No acute fracture or dislocation.  Mild symmetric arthrosis noted in the bilateral hip joints on the AP view.    Procedures    Assessment / Plan    Assessment/Plan:   Diagnoses and all orders for this visit:    1. Right hip pain (Primary)  -     XR Hip With or Without Pelvis 2 - 3 View Right    2. Gluteal tendinitis of right buttock    3. Greater trochanteric bursitis of right hip    Other orders  -     - Large Joint Arthrocentesis: R greater trochanteric bursa    Right hip pain that has been on and off for multiple years.  Localizes pain to the gluteal and greater trochanteric region.  Previous treatments include gabapentin.  Radiographs show mild symmetric arthrosis in the hip joint..  History and exam more consistent with gluteal tendinitis and greater trochanteric bursitis.  Given his history of chronic kidney disease stage IIIb, he must avoid NSAIDs.  Therefore, recommended that we treat him conservatively with a home exercise program of stretches provided by me.  I would also recommend injecting corticosteroid into the bursa in the lateral hip.  Risk and benefits of this procedure were discussed and he is elected to proceed.  Tolerated procedure well.  See procedure note.  Follow-up will be as his symptoms dictate.    Previous imaging studies reviewed: 10/31/2023-radiographs of the right hip.    Previous laboratory results reviewed: 3/6/2023-hemoglobin A1c 6.5%.  Creatinine 1.79, EGFR 43.    Previous documentation reviewed: 10/11/2023-Chavo Duenas MD-office visit.    Follow Up:   Return if symptoms worsen or fail to improve.      Harshil Ag MD  [facility]

## 2023-10-31 NOTE — PROGRESS NOTES
Procedure   - Large Joint Arthrocentesis: R greater trochanteric bursa on 10/31/2023 1:25 PM  Indications: pain  Details: 21 G needle, lateral approach  Medications: 4 mL bupivacaine (PF) 0.5 %; 4 mL lidocaine 2%; 2 mL triamcinolone acetonide 40 MG/ML  Outcome: tolerated well, no immediate complications  Procedure, treatment alternatives, risks and benefits explained, specific risks discussed. Consent was given by the patient. Immediately prior to procedure a time out was called to verify the correct patient, procedure, equipment, support staff and site/side marked as required. Patient was prepped and draped in the usual sterile fashion.

## 2023-12-28 RX ORDER — ROPINIROLE 0.25 MG/1
0.25 TABLET, FILM COATED ORAL NIGHTLY
Qty: 30 TABLET | Refills: 1 | Status: SHIPPED | OUTPATIENT
Start: 2023-12-28

## 2023-12-28 NOTE — TELEPHONE ENCOUNTER
Caller: Ibrahima Martinez    Relationship: Self    Best call back number: [phone]     Requested Prescriptions:   Requested Prescriptions     Pending Prescriptions Disp Refills    rOPINIRole (REQUIP) 0.25 MG tablet 30 tablet 1     Sig: Take 1 tablet by mouth Every Night. Take 1 hour before bedtime.        Pharmacy where request should be sent: [facility] [address] [phone] [facility] [phone]      Last office visit with prescribing clinician: 10/11/2023   Last telemedicine visit with prescribing clinician: Visit date not found   Next office visit with prescribing clinician: 1/11/2024     Additional details provided by patient: PATIENT HAS 1 DAY LEFT OF THIS MEDICATION.    Does the patient have less than a 3 day supply:  [x] Yes  [] No    Would you like a call back once the refill request has been completed: [x] Yes [] No    If the office needs to give you a call back, can they leave a voicemail: [x] Yes [] No    [employer]   12/28/23 13:04 EST

## 2024-01-11 ENCOUNTER — OFFICE VISIT (OUTPATIENT)
Dept: FAMILY MEDICINE CLINIC | Facility: CLINIC | Age: 59
End: 2024-01-11
Payer: COMMERCIAL

## 2024-01-11 VITALS
DIASTOLIC BLOOD PRESSURE: 122 MMHG | WEIGHT: 210 LBS | HEART RATE: 91 BPM | HEIGHT: 70 IN | OXYGEN SATURATION: 94 % | BODY MASS INDEX: 30.06 KG/M2 | SYSTOLIC BLOOD PRESSURE: 200 MMHG

## 2024-01-11 DIAGNOSIS — I10 PRIMARY HYPERTENSION: ICD-10-CM

## 2024-01-11 DIAGNOSIS — M25.562 CHRONIC PAIN OF BOTH KNEES: ICD-10-CM

## 2024-01-11 DIAGNOSIS — M54.16 LUMBAR RADICULOPATHY: ICD-10-CM

## 2024-01-11 DIAGNOSIS — G89.29 CHRONIC PAIN OF BOTH KNEES: ICD-10-CM

## 2024-01-11 DIAGNOSIS — E11.65 TYPE 2 DIABETES MELLITUS WITH HYPERGLYCEMIA, WITHOUT LONG-TERM CURRENT USE OF INSULIN: Primary | ICD-10-CM

## 2024-01-11 DIAGNOSIS — E29.1 MALE HYPOGONADISM: ICD-10-CM

## 2024-01-11 DIAGNOSIS — M25.561 CHRONIC PAIN OF BOTH KNEES: ICD-10-CM

## 2024-01-11 LAB
EXPIRATION DATE: ABNORMAL
HBA1C MFR BLD: 7.4 % (ref 4.5–5.7)
Lab: ABNORMAL

## 2024-01-11 RX ORDER — GABAPENTIN 600 MG/1
600 TABLET ORAL 4 TIMES DAILY
Qty: 120 TABLET | Refills: 4 | Status: SHIPPED | OUTPATIENT
Start: 2024-01-11

## 2024-01-11 RX ORDER — ALBUTEROL SULFATE 90 UG/1
2 AEROSOL, METERED RESPIRATORY (INHALATION) EVERY 4 HOURS PRN
Qty: 18 G | Refills: 2 | Status: SHIPPED | OUTPATIENT
Start: 2024-01-11

## 2024-01-11 RX ORDER — ROPINIROLE 0.25 MG/1
0.25 TABLET, FILM COATED ORAL NIGHTLY
Qty: 90 TABLET | Refills: 1 | Status: SHIPPED | OUTPATIENT
Start: 2024-01-11

## 2024-01-11 NOTE — PROGRESS NOTES
Follow Up Office Visit      Date of Visit:  2024   Patient Name: Ibrahima Martinez  : 1965   MRN: [id number]     Chief Complaint:    Chief Complaint   Patient presents with    Osteoarthritis    Diabetes       History of Present Illness: Ibrahima Martinez is a 58 y.o. male who is here today for follow up.  Multiple issues discussed today.  Patient with osteoarthritis is not controlled.  Has right hip pain and bilateral knee pain.  We had done the injections and he had minimal relief.  He is seeing Ortho and they did 1 hip injection and there was minimal relief.  We are going to need to lilly-ray his knees.  Patient also with diabetes it is not completely controlled.  A1c more elevated.  Blood pressure also elevated today.        Subjective      Review of Systems:   Review of Systems   Constitutional:  Negative for fatigue and fever.   HENT:  Negative for congestion and ear pain.    Respiratory:  Negative for apnea, cough, chest tightness and shortness of breath.    Cardiovascular:  Negative for chest pain.   Gastrointestinal:  Negative for abdominal pain, constipation, diarrhea and nausea.   Musculoskeletal:  Positive for arthralgias.   Psychiatric/Behavioral:  Negative for depressed mood and stress.        Past Medical History:   Past Medical History:   Diagnosis Date    Back problem     Diabetes     Heart disease     Hypertension        Past Surgical History:   Past Surgical History:   Procedure Laterality Date    CARDIAC CATHETERIZATION      CORONARY ARTERY BYPASS GRAFT  2022    Lima to LAD, reverse saphenous vein graft to 1st diagonal, reverse saphenous vein graft to ramus intermeduiusand reverse saphenous vein graft sequential to PDA then to first right posterior lateral branch artery, then to the second posterolateral artery (Dr Yogi Mcgregor    CORONARY STENT PLACEMENT      EPIDURAL BLOCK      For right-sided sciatica pain.        Family History:   Family History   Problem Relation Age of Onset     Heart attack Father     Hypertension Father     Heart disease Father     Arthritis Mother        Social History:   Social History     Socioeconomic History    Marital status:    Tobacco Use    Smoking status: Former     Packs/day: 1.00     Years: 15.00     Additional pack years: 0.00     Total pack years: 15.00     Types: Cigarettes    Smokeless tobacco: Never   Vaping Use    Vaping Use: Never used   Substance and Sexual Activity    Alcohol use: No    Drug use: Never    Sexual activity: Defer       Medications:     Current Outpatient Medications:     albuterol sulfate  (90 Base) MCG/ACT inhaler, Inhale 2 puffs Every 4 (Four) Hours As Needed for Wheezing., Disp: 18 g, Rfl: 2    gabapentin (NEURONTIN) 600 MG tablet, Take 1 tablet by mouth 4 (Four) Times a Day., Disp: 120 tablet, Rfl: 4    rOPINIRole (REQUIP) 0.25 MG tablet, Take 1 tablet by mouth Every Night. Take 1 hour before bedtime., Disp: 90 tablet, Rfl: 1    Acetaminophen Extra Strength 500 MG tablet, , Disp: , Rfl:     aspirin 81 MG tablet, Take  by mouth Daily., Disp: , Rfl:     atorvastatin (LIPITOR) 80 MG tablet, Take 1 tablet by mouth Daily., Disp: 90 tablet, Rfl: 1    clopidogrel (PLAVIX) 75 MG tablet, Take 1 tablet by mouth Daily., Disp: 90 tablet, Rfl: 1    furosemide (LASIX) 40 MG tablet, Take 1 tablet by mouth Daily., Disp: 120 tablet, Rfl: 3    metoprolol tartrate (LOPRESSOR) 50 MG tablet, Take 1 tablet by mouth 2 (Two) Times a Day., Disp: 180 tablet, Rfl: 1    mometasone-formoterol (DULERA 100) 100-5 MCG/ACT inhaler, Inhale 2 puffs 2 (Two) Times a Day., Disp: 1 each, Rfl: 5    nitroglycerin (NITROSTAT) 0.4 MG SL tablet, , Disp: , Rfl:     ONETOUCH DELICA LANCETS 33G misc, , Disp: , Rfl:     Oxymetazoline HCl (NASAL SPRAY NA), into the nostril(s) as directed by provider., Disp: , Rfl:     sildenafil (Viagra) 100 MG tablet, Take 1 tablet by mouth Daily As Needed for Erectile Dysfunction., Disp: 30 tablet, Rfl: 1    Stool Softener 100 MG  "capsule, , Disp: , Rfl:     Testosterone Cypionate (DEPOTESTOTERONE CYPIONATE) 200 MG/ML injection, 1 ml every 14 days , im, Disp: 10 mL, Rfl: 1    tiotropium bromide monohydrate (SPIRIVA RESPIMAT) 2.5 MCG/ACT aerosol solution inhaler, Inhale 2 puffs Daily., Disp: 1 each, Rfl: 5    Allergies:   No Known Allergies    Objective     Physical Exam:  Vital Signs:   Vitals:    01/11/24 1325   BP: (!) 200/122   Pulse: 91   SpO2: 94%   Weight: 95.3 kg (210 lb)   Height: 177.8 cm (70\")     Body mass index is 30.13 kg/m².     Physical Exam  Vitals and nursing note reviewed.   Constitutional:       General: He is not in acute distress.     Appearance: Normal appearance. He is not ill-appearing.   HENT:      Head: Normocephalic and atraumatic.      Right Ear: Tympanic membrane and ear canal normal.      Left Ear: Tympanic membrane and ear canal normal.      Nose: Nose normal.   Cardiovascular:      Rate and Rhythm: Normal rate and regular rhythm.      Heart sounds: Normal heart sounds.   Pulmonary:      Effort: Pulmonary effort is normal.      Breath sounds: Normal breath sounds.   Neurological:      Mental Status: He is alert and oriented to person, place, and time. Mental status is at baseline.   Psychiatric:         Mood and Affect: Mood normal.         Procedures      Assessment / Plan      Assessment/Plan:   Diagnoses and all orders for this visit:    1. Type 2 diabetes mellitus with hyperglycemia, without long-term current use of insulin (Primary)  -     POC Glycosylated Hemoglobin (Hb A1C)  -     gabapentin (NEURONTIN) 600 MG tablet; Take 1 tablet by mouth 4 (Four) Times a Day.  Dispense: 120 tablet; Refill: 4    2. Chronic pain of both knees  -     XR Knee 1 or 2 View Bilateral; Future  -     Ambulatory Referral to Orthopedic Surgery  -     Ambulatory Referral to Orthopedic Surgery  -     gabapentin (NEURONTIN) 600 MG tablet; Take 1 tablet by mouth 4 (Four) Times a Day.  Dispense: 120 tablet; Refill: 4    3. Lumbar " radiculopathy  -     gabapentin (NEURONTIN) 600 MG tablet; Take 1 tablet by mouth 4 (Four) Times a Day.  Dispense: 120 tablet; Refill: 4    4. Primary hypertension  -     CBC Auto Differential  -     Comprehensive Metabolic Panel  -     Lipid Panel  -     TSH    5. Male hypogonadism  -     Testosterone; Future    Other orders  -     albuterol sulfate  (90 Base) MCG/ACT inhaler; Inhale 2 puffs Every 4 (Four) Hours As Needed for Wheezing.  Dispense: 18 g; Refill: 2  -     rOPINIRole (REQUIP) 0.25 MG tablet; Take 1 tablet by mouth Every Night. Take 1 hour before bedtime.  Dispense: 90 tablet; Refill: 1         Will place future orders for testosterone.  Refilled medications for his lumbar radiculopathy.  Making referral for Ortho to relook at his hip and also look at his knees.  We had tried to do an injection in the knees and there was minimal relief.  Blood work for his hypertension and diabetes as well.  Blood sugars have gone up some and he will need to watch his sugars more closely will probably have to use medication.    Follow Up:   No follow-ups on file.    Chavo Duenas  [facility] [place]

## 2024-01-12 LAB
ALBUMIN SERPL-MCNC: 4.3 G/DL (ref 3.8–4.9)
ALBUMIN/GLOB SERPL: 1.9 {RATIO} (ref 1.2–2.2)
ALP SERPL-CCNC: 111 IU/L (ref 44–121)
ALT SERPL-CCNC: 16 IU/L (ref 0–44)
AST SERPL-CCNC: 14 IU/L (ref 0–40)
BASOPHILS # BLD AUTO: 0.1 X10E3/UL (ref 0–0.2)
BASOPHILS NFR BLD AUTO: 1 %
BILIRUB SERPL-MCNC: 0.8 MG/DL (ref 0–1.2)
BUN SERPL-MCNC: 20 MG/DL (ref 6–24)
BUN/CREAT SERPL: 14 (ref 9–20)
CALCIUM SERPL-MCNC: 9 MG/DL (ref 8.7–10.2)
CHLORIDE SERPL-SCNC: 103 MMOL/L (ref 96–106)
CHOLEST SERPL-MCNC: 99 MG/DL (ref 100–199)
CO2 SERPL-SCNC: 26 MMOL/L (ref 20–29)
CREAT SERPL-MCNC: 1.45 MG/DL (ref 0.76–1.27)
EGFRCR SERPLBLD CKD-EPI 2021: 56 ML/MIN/1.73
EOSINOPHIL # BLD AUTO: 0.3 X10E3/UL (ref 0–0.4)
EOSINOPHIL NFR BLD AUTO: 2 %
ERYTHROCYTE [DISTWIDTH] IN BLOOD BY AUTOMATED COUNT: 15.2 % (ref 11.6–15.4)
GLOBULIN SER CALC-MCNC: 2.3 G/DL (ref 1.5–4.5)
GLUCOSE SERPL-MCNC: 216 MG/DL (ref 70–99)
HCT VFR BLD AUTO: 51.4 % (ref 37.5–51)
HDLC SERPL-MCNC: 51 MG/DL
HGB BLD-MCNC: 17.3 G/DL (ref 13–17.7)
IMM GRANULOCYTES # BLD AUTO: 0.1 X10E3/UL (ref 0–0.1)
IMM GRANULOCYTES NFR BLD AUTO: 1 %
LDLC SERPL CALC-MCNC: 25 MG/DL (ref 0–99)
LYMPHOCYTES # BLD AUTO: 1.2 X10E3/UL (ref 0.7–3.1)
LYMPHOCYTES NFR BLD AUTO: 11 %
MCH RBC QN AUTO: 31.1 PG (ref 26.6–33)
MCHC RBC AUTO-ENTMCNC: 33.7 G/DL (ref 31.5–35.7)
MCV RBC AUTO: 92 FL (ref 79–97)
MONOCYTES # BLD AUTO: 0.7 X10E3/UL (ref 0.1–0.9)
MONOCYTES NFR BLD AUTO: 7 %
NEUTROPHILS # BLD AUTO: 8.2 X10E3/UL (ref 1.4–7)
NEUTROPHILS NFR BLD AUTO: 78 %
PLATELET # BLD AUTO: 158 X10E3/UL (ref 150–450)
POTASSIUM SERPL-SCNC: 4 MMOL/L (ref 3.5–5.2)
PROT SERPL-MCNC: 6.6 G/DL (ref 6–8.5)
RBC # BLD AUTO: 5.57 X10E6/UL (ref 4.14–5.8)
SODIUM SERPL-SCNC: 146 MMOL/L (ref 134–144)
TRIGL SERPL-MCNC: 136 MG/DL (ref 0–149)
TSH SERPL DL<=0.005 MIU/L-ACNC: 1 UIU/ML (ref 0.45–4.5)
VLDLC SERPL CALC-MCNC: 23 MG/DL (ref 5–40)
WBC # BLD AUTO: 10.4 X10E3/UL (ref 3.4–10.8)

## 2024-01-23 ENCOUNTER — OFFICE VISIT (OUTPATIENT)
Dept: ORTHOPEDIC SURGERY | Facility: CLINIC | Age: 59
End: 2024-01-23
Payer: COMMERCIAL

## 2024-01-23 VITALS
SYSTOLIC BLOOD PRESSURE: 164 MMHG | WEIGHT: 210 LBS | HEIGHT: 70 IN | DIASTOLIC BLOOD PRESSURE: 90 MMHG | BODY MASS INDEX: 30.06 KG/M2

## 2024-01-23 DIAGNOSIS — M25.562 PAIN IN BOTH KNEES, UNSPECIFIED CHRONICITY: Primary | ICD-10-CM

## 2024-01-23 DIAGNOSIS — M11.20 CHONDROCALCINOSIS: ICD-10-CM

## 2024-01-23 DIAGNOSIS — M17.0 BILATERAL PRIMARY OSTEOARTHRITIS OF KNEE: ICD-10-CM

## 2024-01-23 DIAGNOSIS — M25.561 PAIN IN BOTH KNEES, UNSPECIFIED CHRONICITY: Primary | ICD-10-CM

## 2024-01-23 PROCEDURE — 1160F RVW MEDS BY RX/DR IN RCRD: CPT | Performed by: STUDENT IN AN ORGANIZED HEALTH CARE EDUCATION/TRAINING PROGRAM

## 2024-01-23 PROCEDURE — 20610 DRAIN/INJ JOINT/BURSA W/O US: CPT | Performed by: STUDENT IN AN ORGANIZED HEALTH CARE EDUCATION/TRAINING PROGRAM

## 2024-01-23 PROCEDURE — 99214 OFFICE O/P EST MOD 30 MIN: CPT | Performed by: STUDENT IN AN ORGANIZED HEALTH CARE EDUCATION/TRAINING PROGRAM

## 2024-01-23 PROCEDURE — 1159F MED LIST DOCD IN RCRD: CPT | Performed by: STUDENT IN AN ORGANIZED HEALTH CARE EDUCATION/TRAINING PROGRAM

## 2024-01-23 PROCEDURE — 3080F DIAST BP >= 90 MM HG: CPT | Performed by: STUDENT IN AN ORGANIZED HEALTH CARE EDUCATION/TRAINING PROGRAM

## 2024-01-23 PROCEDURE — 3077F SYST BP >= 140 MM HG: CPT | Performed by: STUDENT IN AN ORGANIZED HEALTH CARE EDUCATION/TRAINING PROGRAM

## 2024-01-23 RX ORDER — LIDOCAINE HYDROCHLORIDE 10 MG/ML
4 INJECTION, SOLUTION EPIDURAL; INFILTRATION; INTRACAUDAL; PERINEURAL
Status: COMPLETED | OUTPATIENT
Start: 2024-01-23 | End: 2024-01-23

## 2024-01-23 RX ORDER — TRIAMCINOLONE ACETONIDE 40 MG/ML
2 INJECTION, SUSPENSION INTRA-ARTICULAR; INTRAMUSCULAR
Status: COMPLETED | OUTPATIENT
Start: 2024-01-23 | End: 2024-01-23

## 2024-01-23 RX ORDER — BUPIVACAINE HYDROCHLORIDE 5 MG/ML
4 INJECTION, SOLUTION EPIDURAL; INTRACAUDAL
Status: COMPLETED | OUTPATIENT
Start: 2024-01-23 | End: 2024-01-23

## 2024-01-23 RX ADMIN — BUPIVACAINE HYDROCHLORIDE 4 ML: 5 INJECTION, SOLUTION EPIDURAL; INTRACAUDAL at 09:18

## 2024-01-23 RX ADMIN — TRIAMCINOLONE ACETONIDE 2 ML: 40 INJECTION, SUSPENSION INTRA-ARTICULAR; INTRAMUSCULAR at 09:18

## 2024-01-23 RX ADMIN — LIDOCAINE HYDROCHLORIDE 4 ML: 10 INJECTION, SOLUTION EPIDURAL; INFILTRATION; INTRACAUDAL; PERINEURAL at 09:18

## 2024-01-23 NOTE — PROGRESS NOTES
[facility]     Office Visit       Date: 01/23/2024   Patient Name: Ibrahima Martinez  MRN: [id number]  YOB: 1965    Referring Physician: No ref. provider found     Chief Complaint:   Chief Complaint   Patient presents with    Right Knee - Pain    Left Knee - Pain       History of Present Illness:   Ibrahima Martinez is a 58 y.o. male who presents with bilateral knee pain for 1 year(s). Onset atraumatic and gradual in nature. Pain is localized to the medial joint line and is a 6/10 on the pain scale. Pain is described as dull, aching, throbbing, and shooting. Associated symptoms include pain, popping, and giving way/buckling. The pain is worse with walking, standing, sleeping, working, leisure, and lying on affected side; nothing make it better. Previous treatments have included: cane/walker and NSAIDS since symptom onset. Although some transient relief was reported with these interventions, these conservative measures have failed and symptoms have persisted. The patient is limited in daily activities and has had a significant decrease in quality of life as a result. He denies fevers, chills, or constitutional symptoms.    Subjective   Review of Systems: Review of Systems   Constitutional:  Negative for chills, fever, unexpected weight gain and unexpected weight loss.   HENT:  Negative for congestion, postnasal drip and rhinorrhea.    Eyes:  Negative for blurred vision.   Respiratory:  Negative for shortness of breath.    Cardiovascular:  Negative for leg swelling.   Gastrointestinal:  Negative for abdominal pain, nausea and vomiting.   Genitourinary:  Negative for difficulty urinating.   Musculoskeletal:  Positive for arthralgias. Negative for gait problem, joint swelling and myalgias.   Skin:  Negative for skin lesions and wound.   Neurological:  Negative for dizziness, weakness, light-headedness and numbness.   Hematological:  Does not  bruise/bleed easily.   Psychiatric/Behavioral:  Negative for depressed mood.         I have reviewed the following portions of the patient's history:History of Present Illness and review of systems.    Past Medical History:   Past Medical History:   Diagnosis Date    Back problem     Diabetes     Heart disease     Hypertension        Past Surgical History:   Past Surgical History:   Procedure Laterality Date    CARDIAC CATHETERIZATION      CORONARY ARTERY BYPASS GRAFT  03/28/2022    [place]ima to LAD, reverse saphenous vein graft to 1st diagonal, reverse saphenous vein graft to ramus intermeduiusand reverse saphenous vein graft sequential to PDA then to first right posterior lateral branch artery, then to the second posterolateral artery (Dr Yogi Mcgregor    CORONARY STENT PLACEMENT      EPIDURAL BLOCK  2021    For right-sided sciatica pain.        Family History:   Family History   Problem Relation Age of Onset    Heart attack Father     Hypertension Father     Heart disease Father     Arthritis Mother        Social History:   Social History     Socioeconomic History    Marital status:    Tobacco Use    Smoking status: Former     Packs/day: 1.00     Years: 15.00     Additional pack years: 0.00     Total pack years: 15.00     Types: Cigarettes    Smokeless tobacco: Never   Vaping Use    Vaping Use: Never used   Substance and Sexual Activity    Alcohol use: No    Drug use: Never    Sexual activity: Defer       Medications:   Current Outpatient Medications:     Acetaminophen Extra Strength 500 MG tablet, , Disp: , Rfl:     albuterol sulfate  (90 Base) MCG/ACT inhaler, Inhale 2 puffs Every 4 (Four) Hours As Needed for Wheezing., Disp: 18 g, Rfl: 2    aspirin 81 MG tablet, Take  by mouth Daily., Disp: , Rfl:     atorvastatin (LIPITOR) 80 MG tablet, Take 1 tablet by mouth Daily., Disp: 90 tablet, Rfl: 1    clopidogrel (PLAVIX) 75 MG tablet, Take 1 tablet by mouth Daily., Disp: 90 tablet, Rfl: 1    furosemide  "(LASIX) 40 MG tablet, Take 1 tablet by mouth Daily., Disp: 120 tablet, Rfl: 3    gabapentin (NEURONTIN) 600 MG tablet, Take 1 tablet by mouth 4 (Four) Times a Day., Disp: 120 tablet, Rfl: 4    metoprolol tartrate (LOPRESSOR) 50 MG tablet, Take 1 tablet by mouth 2 (Two) Times a Day., Disp: 180 tablet, Rfl: 1    mometasone-formoterol (DULERA 100) 100-5 MCG/ACT inhaler, Inhale 2 puffs 2 (Two) Times a Day., Disp: 1 each, Rfl: 5    nitroglycerin (NITROSTAT) 0.4 MG SL tablet, , Disp: , Rfl:     ONETOUCH DELICA LANCETS 33G misc, , Disp: , Rfl:     Oxymetazoline HCl (NASAL SPRAY NA), into the nostril(s) as directed by provider., Disp: , Rfl:     rOPINIRole (REQUIP) 0.25 MG tablet, Take 1 tablet by mouth Every Night. Take 1 hour before bedtime., Disp: 90 tablet, Rfl: 1    sildenafil (Viagra) 100 MG tablet, Take 1 tablet by mouth Daily As Needed for Erectile Dysfunction., Disp: 30 tablet, Rfl: 1    Stool Softener 100 MG capsule, , Disp: , Rfl:     Testosterone Cypionate (DEPOTESTOTERONE CYPIONATE) 200 MG/ML injection, 1 ml every 14 days , im, Disp: 10 mL, Rfl: 1    tiotropium bromide monohydrate (SPIRIVA RESPIMAT) 2.5 MCG/ACT aerosol solution inhaler, Inhale 2 puffs Daily., Disp: 1 each, Rfl: 5    Allergies: No Known Allergies    I reviewed the patient's chief complaint, history of present illness, review of systems, past medical history, surgical history, family history, social history, medications and allergy list.     Objective    Vital Signs:   Vitals:    01/23/24 0848   BP: 164/90   Weight: 95.3 kg (210 lb)   Height: 177.8 cm (70\")     Body mass index is 30.13 kg/m².   BMI is >= 30 and <35. (Class 1 Obesity). The following options were offered after discussion;: exercise counseling/recommendations and nutrition counseling/recommendations     Patient reports that he is a former smoker. He quit smoking and has not resumed smoking since that time.  This behavior was applauded and she was encouraged to continue in smoking " cessation.  We will continue to monitor at subsequent visits.    Ortho Exam:  Constitutional: General Appearance: healthy-appearing, NAD, and normal body habitus.   Gait and Station: Appearance: limp and antalgic gait and ambulates with no assitive devices.   Skin: Right Lower Extremity: normal. Left Lower Extremity: normal.   Psychiatric: Orientation: oriented to time, place, and person. Mood and Affect: normal mood and affect and active and alert.   Bilateral knee exam:   There is swelling and effusion but no warmth or erythema of the knee.    The skin is clear.    Overall the alignment of the knee is varus   The patient has 5/5 strength with ankle plantar flexion, dorsiflexion, inversion, eversion, and great toe extension.    Sensation is grossly present to light touch in the superficial peroneal, deep peroneal, and tibial nerve distributions.    The dorsalis pedis pulse is 2+ and the foot is well perfused with brisk capillary refill.   Active ROM is 0° to 110°.   Passive ROM is 0° to 110°.   The knee shows no gross instability to varus/valgus stress testing, anterior/posterior drawer sign, and Lachman testing.    There is tenderness to palpation over the medial/lateral joint line. Patellar grind test is positive.   Hip ROM is full and painless.    Results Review:   Imaging Results (Last 24 Hours)       Procedure Component Value Units Date/Time    XR Knee 4+ View Bilateral [[id number]] Resulted: 01/23/24 0827     Updated: 01/23/24 0841        I personally reviewed and interpreted radiographs of the bilateral knees from 1/23/2024.  No acute fracture or dislocation.  Tricompartmental degenerative changes are noted in both knees as well as chondrocalcinosis of both menisci.    Procedures    Assessment / Plan    Assessment/Plan:   Diagnoses and all orders for this visit:    1. Pain in both knees, unspecified chronicity (Primary)  -     XR Knee 4+ View Bilateral    2. Bilateral primary osteoarthritis of knee    3.  Chondrocalcinosis    Other orders  -     - Large Joint Arthrocentesis: bilateral knee    Bilateral knee pain worse on the left compared to right.  He is tender along the lateral joint line of the left knee and the medial joint line of the right knee.  Radiographs of both knees show tricompartmental degenerative changes as well as chondrocalcinosis of both the medial and lateral menisci.  I provided him with a copy of his radiographs and we discussed them in detail. We discussed a comprehensive nonoperative treatment approach to knee osteoarthritis. This includes topical and oral anti-inflammatory medication, physical therapy, bracing, activity modification, and periodic as needed corticosteroid or viscosupplementation injections. Today, we will start treatment with ultrasound-guided corticosteroid injections into both knees to control pain.  Encouraged him to be active.  Can continue with gabapentin and acetaminophen extra strength.  Should avoid NSAIDs including Aleve and ibuprofen given his history of chronic kidney disease stage IIIa.  I will see him back in 3 months to monitor his response.    Previous imaging studies reviewed: 1/23/2024-radiographs of the bilateral knees.    Previous laboratory results reviewed: 1/11/2024-creatinine 1.45, EGFR 56.  1/11/2024-hemoglobin A1c 7.4%.    Follow Up:   Return in about 3 months (around 4/23/2024) for Recheck.      Harshil Ag MD  [facility]

## 2024-01-23 NOTE — PROGRESS NOTES
Procedure   - Large Joint Arthrocentesis: bilateral knee on 1/23/2024 9:18 AM  Indications: pain  Details: 22 G needle, anterolateral approach  Medications (Right): 4 mL bupivacaine (PF) 0.5 %; 4 mL lidocaine PF 1% 1 %; 2 mL triamcinolone acetonide 40 MG/ML  Medications (Left): 4 mL bupivacaine (PF) 0.5 %; 4 mL lidocaine PF 1% 1 %; 2 mL triamcinolone acetonide 40 MG/ML  Outcome: tolerated well, no immediate complications  Procedure, treatment alternatives, risks and benefits explained, specific risks discussed. Consent was given by the patient. Immediately prior to procedure a time out was called to verify the correct patient, procedure, equipment, support staff and site/side marked as required. Patient was prepped and draped in the usual sterile fashion.

## 2024-01-30 ENCOUNTER — OFFICE VISIT (OUTPATIENT)
Dept: ORTHOPEDIC SURGERY | Facility: CLINIC | Age: 59
End: 2024-01-30
Payer: COMMERCIAL

## 2024-01-30 VITALS — WEIGHT: 210 LBS | HEIGHT: 70 IN | BODY MASS INDEX: 30.06 KG/M2

## 2024-01-30 DIAGNOSIS — M76.01 GLUTEAL TENDINITIS OF RIGHT BUTTOCK: Primary | ICD-10-CM

## 2024-01-30 PROCEDURE — 20610 DRAIN/INJ JOINT/BURSA W/O US: CPT | Performed by: STUDENT IN AN ORGANIZED HEALTH CARE EDUCATION/TRAINING PROGRAM

## 2024-01-30 RX ORDER — BUPIVACAINE HYDROCHLORIDE 2.5 MG/ML
4 INJECTION, SOLUTION EPIDURAL; INFILTRATION; INTRACAUDAL
Status: COMPLETED | OUTPATIENT
Start: 2024-01-30 | End: 2024-01-30

## 2024-01-30 RX ORDER — LIDOCAINE HYDROCHLORIDE 10 MG/ML
4 INJECTION, SOLUTION EPIDURAL; INFILTRATION; INTRACAUDAL; PERINEURAL
Status: COMPLETED | OUTPATIENT
Start: 2024-01-30 | End: 2024-01-30

## 2024-01-30 RX ORDER — TRIAMCINOLONE ACETONIDE 40 MG/ML
2 INJECTION, SUSPENSION INTRA-ARTICULAR; INTRAMUSCULAR
Status: COMPLETED | OUTPATIENT
Start: 2024-01-30 | End: 2024-01-30

## 2024-01-30 RX ADMIN — BUPIVACAINE HYDROCHLORIDE 4 ML: 2.5 INJECTION, SOLUTION EPIDURAL; INFILTRATION; INTRACAUDAL at 14:40

## 2024-01-30 RX ADMIN — TRIAMCINOLONE ACETONIDE 2 ML: 40 INJECTION, SUSPENSION INTRA-ARTICULAR; INTRAMUSCULAR at 14:40

## 2024-01-30 RX ADMIN — LIDOCAINE HYDROCHLORIDE 4 ML: 10 INJECTION, SOLUTION EPIDURAL; INFILTRATION; INTRACAUDAL; PERINEURAL at 14:40

## 2024-01-30 NOTE — PROGRESS NOTES
Procedure   - Large Joint Arthrocentesis: R greater trochanteric bursa on 1/30/2024 2:40 PM  Indications: pain  Details: 22 G needle, lateral approach  Medications: 4 mL bupivacaine (PF) 0.25 %; 2 mL triamcinolone acetonide 40 MG/ML; 4 mL lidocaine PF 1% 1 %  Outcome: tolerated well, no immediate complications  Procedure, treatment alternatives, risks and benefits explained, specific risks discussed. Consent was given by the patient. Immediately prior to procedure a time out was called to verify the correct patient, procedure, equipment, support staff and site/side marked as required. Patient was prepped and draped in the usual sterile fashion.        58-year-old male with right lateral hip pain from gluteal tendinitis.  Here today for ultrasound-guided injection into the Subglute max bursa.  Had initial relief that has since resolved from prior injection.  Risks and benefits of this procedure were discussed.  He elected to proceed and tolerated procedure well.  See procedure note.  I will have him follow-up in 4 to 6 weeks to monitor his response.  If symptoms not completely improved, would look to obtain additional imaging of the hip.

## 2024-02-09 ENCOUNTER — LAB (OUTPATIENT)
Dept: FAMILY MEDICINE CLINIC | Facility: CLINIC | Age: 59
End: 2024-02-09
Payer: COMMERCIAL

## 2024-02-09 ENCOUNTER — TELEPHONE (OUTPATIENT)
Dept: FAMILY MEDICINE CLINIC | Facility: CLINIC | Age: 59
End: 2024-02-09
Payer: COMMERCIAL

## 2024-02-09 DIAGNOSIS — E11.65 TYPE 2 DIABETES MELLITUS WITH HYPERGLYCEMIA, WITHOUT LONG-TERM CURRENT USE OF INSULIN: Primary | ICD-10-CM

## 2024-02-09 RX ORDER — LANCETS 30 GAUGE
1 EACH MISCELLANEOUS DAILY
Qty: 100 EACH | Refills: 5 | Status: SHIPPED | OUTPATIENT
Start: 2024-02-09

## 2024-02-09 RX ORDER — LANCETS 30 GAUGE
EACH MISCELLANEOUS
COMMUNITY
End: 2024-02-09 | Stop reason: SDUPTHER

## 2024-02-09 RX ORDER — BLOOD-GLUCOSE METER
1 KIT MISCELLANEOUS DAILY
COMMUNITY
End: 2024-02-09 | Stop reason: SDUPTHER

## 2024-02-09 RX ORDER — BLOOD-GLUCOSE METER
1 KIT MISCELLANEOUS DAILY
Qty: 1 EACH | Refills: 0 | Status: SHIPPED | OUTPATIENT
Start: 2024-02-09

## 2024-02-09 RX ORDER — BLOOD SUGAR DIAGNOSTIC
1 STRIP MISCELLANEOUS DAILY
Qty: 100 STRIP | Refills: 5 | Status: SHIPPED | OUTPATIENT
Start: 2024-02-09

## 2024-02-09 NOTE — TELEPHONE ENCOUNTER
Caller: Cayla Martinez    Relationship: Self    Best call back number: [phone]     What medication are you requesting: GLUCOMETER AND SUPPLIES    What are your current symptoms: DIABETES    How long have you been experiencing symptoms: YEARS    Have you had these symptoms before:    [x] Yes  [] No    Have you been treated for these symptoms before:   [x] Yes  [] No    If a prescription is needed, what is your preferred pharmacy and phone number:    [address] [phone] [facility] [phone]  [phone]     Additional notes:  CAYLA  ASKS FOR A NEW MACHINE WITH SUPPLIES. HIS IS OLD AND STRIPS ARE OUTDATED.

## 2024-02-09 NOTE — TELEPHONE ENCOUNTER
Caller: Cayla Martinez    Relationship: Self    Best call back number: [phone]     Caller requesting test results: CAYLA    What test was performed: TESTOSTERONE    When was the test performed: [id number]    Where was the test performed: OFFICE     Additional notes: JUST RECEIVED THE RESULTS BUT NOTHING IS MENTIONED ABOUT THE TESTOSTERONE TEST.

## 2024-02-09 NOTE — TELEPHONE ENCOUNTER
Patient contacted and told to come this afternoon for testosterone lab draw, it didn't get completed at lab

## 2024-03-05 ENCOUNTER — OFFICE VISIT (OUTPATIENT)
Dept: ORTHOPEDIC SURGERY | Facility: CLINIC | Age: 59
End: 2024-03-05
Payer: COMMERCIAL

## 2024-03-05 VITALS
SYSTOLIC BLOOD PRESSURE: 158 MMHG | WEIGHT: 210.1 LBS | DIASTOLIC BLOOD PRESSURE: 88 MMHG | BODY MASS INDEX: 30.08 KG/M2 | HEIGHT: 70 IN

## 2024-03-05 DIAGNOSIS — M76.01 GLUTEAL TENDINITIS OF RIGHT BUTTOCK: Primary | ICD-10-CM

## 2024-03-05 PROCEDURE — 99213 OFFICE O/P EST LOW 20 MIN: CPT | Performed by: STUDENT IN AN ORGANIZED HEALTH CARE EDUCATION/TRAINING PROGRAM

## 2024-03-05 PROCEDURE — 1160F RVW MEDS BY RX/DR IN RCRD: CPT | Performed by: STUDENT IN AN ORGANIZED HEALTH CARE EDUCATION/TRAINING PROGRAM

## 2024-03-05 PROCEDURE — 3077F SYST BP >= 140 MM HG: CPT | Performed by: STUDENT IN AN ORGANIZED HEALTH CARE EDUCATION/TRAINING PROGRAM

## 2024-03-05 PROCEDURE — 3079F DIAST BP 80-89 MM HG: CPT | Performed by: STUDENT IN AN ORGANIZED HEALTH CARE EDUCATION/TRAINING PROGRAM

## 2024-03-05 PROCEDURE — 1159F MED LIST DOCD IN RCRD: CPT | Performed by: STUDENT IN AN ORGANIZED HEALTH CARE EDUCATION/TRAINING PROGRAM

## 2024-03-05 RX ORDER — IBUPROFEN 800 MG/1
TABLET ORAL
COMMUNITY
Start: 2024-02-26

## 2024-03-05 NOTE — PROGRESS NOTES
[facility] [place] [facility] Office Follow Up Visit     Office Follow Up      Date: 03/05/2024   Patient Name: Ibrahima Martinez  MRN: [id number]  YOB: 1965    Referring Physician: No ref. provider found     Chief Complaint:   Chief Complaint   Patient presents with    Follow-up     5 week f/u; Gluteal tendinitis of right buttock     History of Present Illness: Ibrahima Martinez is a 58 y.o. male who is here today for follow up on right lateral hip pain.  We have been treating him for gluteal tendinitis.  His pain though is still 6/10.  He is needing a cane to ambulate up at times.  At last visit, we injected the hip and ultrasound-guidance with corticosteroid.  This did not provide significant relief of symptoms.  He has had no further injury.    Subjective   Review of Systems: Review of Systems   Constitutional:  Negative for chills, fever, unexpected weight gain and unexpected weight loss.   HENT:  Negative for congestion, postnasal drip and rhinorrhea.    Eyes:  Negative for blurred vision.   Respiratory:  Negative for shortness of breath.    Cardiovascular:  Negative for leg swelling.   Gastrointestinal:  Negative for abdominal pain, nausea and vomiting.   Genitourinary:  Negative for difficulty urinating.   Musculoskeletal:  Positive for arthralgias. Negative for gait problem, joint swelling and myalgias.   Skin:  Negative for skin lesions and wound.   Neurological:  Negative for dizziness, weakness, light-headedness and numbness.   Hematological:  Does not bruise/bleed easily.   Psychiatric/Behavioral:  Negative for depressed mood.         Medications:   Current Outpatient Medications:     Acetaminophen Extra Strength 500 MG tablet, , Disp: , Rfl:     albuterol sulfate  (90 Base) MCG/ACT inhaler, Inhale 2 puffs Every 4 (Four) Hours As Needed for Wheezing., Disp: 18 g, Rfl: 2    aspirin 81 MG tablet, Take  by mouth Daily., Disp: , Rfl:     atorvastatin (LIPITOR) 80  MG tablet, Take 1 tablet by mouth Daily., Disp: 90 tablet, Rfl: 1    clopidogrel (PLAVIX) 75 MG tablet, Take 1 tablet by mouth Daily., Disp: 90 tablet, Rfl: 1    furosemide (LASIX) 40 MG tablet, Take 1 tablet by mouth Daily., Disp: 120 tablet, Rfl: 3    gabapentin (NEURONTIN) 600 MG tablet, Take 1 tablet by mouth 4 (Four) Times a Day., Disp: 120 tablet, Rfl: 4    Glucose Blood (Blood Glucose Test Strips 333) strip, 1 strip by In Vitro route Daily., Disp: 100 strip, Rfl: 5    glucose monitor monitoring kit, Use 1 each Daily. Test fingerstick blood sugar daily prn diabetes, Disp: 1 each, Rfl: 0    ibuprofen (ADVIL,MOTRIN) 800 MG tablet, , Disp: , Rfl:     Lancets misc, Use 1 Lancet Daily., Disp: 100 each, Rfl: 5    metoprolol tartrate (LOPRESSOR) 50 MG tablet, Take 1 tablet by mouth 2 (Two) Times a Day., Disp: 180 tablet, Rfl: 1    mometasone-formoterol (DULERA 100) 100-5 MCG/ACT inhaler, Inhale 2 puffs 2 (Two) Times a Day., Disp: 1 each, Rfl: 5    nitroglycerin (NITROSTAT) 0.4 MG SL tablet, , Disp: , Rfl:     ONETOUCH DELICA LANCETS 33G misc, , Disp: , Rfl:     Oxymetazoline HCl (NASAL SPRAY NA), into the nostril(s) as directed by provider., Disp: , Rfl:     rOPINIRole (REQUIP) 0.25 MG tablet, Take 1 tablet by mouth Every Night. Take 1 hour before bedtime., Disp: 90 tablet, Rfl: 1    sildenafil (Viagra) 100 MG tablet, Take 1 tablet by mouth Daily As Needed for Erectile Dysfunction., Disp: 30 tablet, Rfl: 1    Stool Softener 100 MG capsule, , Disp: , Rfl:     Testosterone Cypionate (DEPOTESTOTERONE CYPIONATE) 200 MG/ML injection, 1 ml every 14 days , im, Disp: 10 mL, Rfl: 1    tiotropium bromide monohydrate (SPIRIVA RESPIMAT) 2.5 MCG/ACT aerosol solution inhaler, Inhale 2 puffs Daily., Disp: 1 each, Rfl: 5    Allergies: No Known Allergies    I have reviewed and updated the patient's chief complaint, history of present illness, review of systems, past medical history, surgical history, family history, social history,  "medications and allergy list as appropriate.     Objective    Vital Signs:   Vitals:    03/05/24 1342   BP: 158/88   Weight: 95.3 kg (210 lb 1.6 oz)   Height: 177.8 cm (70\")     Body mass index is 30.15 kg/m².  BMI is >= 30 and <35. (Class 1 Obesity). The following options were offered after discussion;: exercise counseling/recommendations and nutrition counseling/recommendations     Patient reports that he is a former smoker. He quit smoking and has not resumed smoking since that time.  This behavior was applauded and she was encouraged to continue in smoking cessation.  We will continue to monitor at subsequent visits.     Ortho Exam:    Gait and Station: Appearance: ambulating with no assistive devices and limp.   Hip/Pelvis Appearance: Inspection: normal axial alignment and pelvis level.   Hips: Bony Palpation Right: tenderness of the greater trochanter. Bony Palpation Left: no tenderness of the iliac crest, the ASIS, the PSIS, the pubic tubercle, the sciatic notch, the ischial tuberosity, the SI joint, or the greater trochanter. Passive Range of Motion Right: no flexion contracture, hamstring tightness popliteal angle, or pain with motion and normal, flexion normal, extension normal, internal rotation normal, and external rotation normal. Passive Range of Motion Left: no flexion contracture, hamstring tightness popliteal angle, or pain with motion and normal, flexion normal, extension normal, internal rotation normal, and external rotation normal. Strength Right: normal 5/5. Strength Left: normal 5/5.   Skin: Right Lower Extremity: normal. Left Lower Extremity: normal.   right hip exam:   Normal hip contour without evidence of ecchymosis or swelling.   Range of motion of the hip shows pain only is exacerbated with Mac test, Straight Leg Raise.   Negative log roll, FADIR.   Hip flexion 110°, internal rotation 10°, external rotation 60°.   Tenderness to palpation greatest at the greater trochanter region. "   Mild tenderness along the iliotibial band.   Mild tenderness at the gluteal region.   Negative tenderness at the ischial tuberosity.   Negative SI joint tenderness to palpation.    Results Review:   Imaging Results (Last 24 Hours)       ** No results found for the last 24 hours. **            Procedures    Assessment / Plan    Assessment/Plan:   Diagnoses and all orders for this visit:    1. Gluteal tendinitis of right buttock (Primary)  -     MRI Hip Right Without Contrast; Future    Follow-up on right hip pain from gluteal tendinitis  Continues to voice lateral and posterior hip pain  No significant improvement with corticosteroid injection into the Sublinox bursa at last visit  Pain worsened by manipulation of the hip, weightbearing activity  Concerns must be present for glue medius tendon tear  Recommend MRI right hip to rule out tear or other derangement, aid in surgical planning  Follow-up after MRI to discuss    Follow Up:   Return for MRI RIGHT HIP FOLLOW UP.      Harshil Ag MD  [facility]

## 2024-03-22 DIAGNOSIS — M76.01 GLUTEAL TENDINITIS OF RIGHT BUTTOCK: ICD-10-CM

## 2024-04-02 ENCOUNTER — HOSPITAL ENCOUNTER (OUTPATIENT)
Dept: MRI IMAGING | Facility: HOSPITAL | Age: 59
Discharge: HOME | End: 2024-04-02
Admitting: STUDENT IN AN ORGANIZED HEALTH CARE EDUCATION/TRAINING PROGRAM
Payer: COMMERCIAL

## 2024-04-02 PROCEDURE — 73721 MRI JNT OF LWR EXTRE W/O DYE: CPT

## 2024-04-03 ENCOUNTER — TELEPHONE (OUTPATIENT)
Dept: FAMILY MEDICINE CLINIC | Facility: CLINIC | Age: 59
End: 2024-04-03
Payer: COMMERCIAL

## 2024-04-03 NOTE — TELEPHONE ENCOUNTER
Letter went out on 02/17/24 and stated it was low and needs appointment to discuss. Patient has upcoming appointment. He requested I resend letter out. sent

## 2024-04-03 NOTE — TELEPHONE ENCOUNTER
Patient is requesting Dr. Duenas review his testosterone lab result from 2/9 and would like a copy of this result mailed to his house.   Can we please review this lab? Thank you!

## 2024-04-11 ENCOUNTER — OFFICE VISIT (OUTPATIENT)
Dept: FAMILY MEDICINE CLINIC | Facility: CLINIC | Age: 59
End: 2024-04-11
Payer: COMMERCIAL

## 2024-04-11 VITALS
SYSTOLIC BLOOD PRESSURE: 230 MMHG | HEIGHT: 69 IN | OXYGEN SATURATION: 97 % | DIASTOLIC BLOOD PRESSURE: 110 MMHG | HEART RATE: 86 BPM | WEIGHT: 200 LBS | BODY MASS INDEX: 29.62 KG/M2

## 2024-04-11 DIAGNOSIS — Z79.899 ENCOUNTER FOR LONG-TERM (CURRENT) USE OF OTHER MEDICATIONS: ICD-10-CM

## 2024-04-11 DIAGNOSIS — E29.1 MALE HYPOGONADISM: ICD-10-CM

## 2024-04-11 DIAGNOSIS — E11.65 TYPE 2 DIABETES MELLITUS WITH HYPERGLYCEMIA, WITHOUT LONG-TERM CURRENT USE OF INSULIN: Primary | ICD-10-CM

## 2024-04-11 DIAGNOSIS — I10 PRIMARY HYPERTENSION: ICD-10-CM

## 2024-04-11 LAB
EXPIRATION DATE: ABNORMAL
EXPIRATION DATE: NORMAL
HBA1C MFR BLD: 7 % (ref 4.5–5.7)
Lab: ABNORMAL
Lab: NORMAL
POC AMPHETAMINES: NEGATIVE
POC BARBITURATES: NEGATIVE
POC BENZODIAZEPHINES: NEGATIVE
POC COCAINE: NEGATIVE
POC CREATININE URINE: 50
POC METHADONE: NEGATIVE
POC METHAMPHETAMINE SCREEN URINE: NEGATIVE
POC MICROALBUMIN URINE: 150
POC OPIATES: NEGATIVE
POC OXYCODONE: POSITIVE
POC PHENCYCLIDINE: NEGATIVE
POC PROPOXYPHENE: NEGATIVE
POC THC: NEGATIVE
POC TRICYCLIC ANTIDEPRESSANTS: NEGATIVE

## 2024-04-11 RX ORDER — TADALAFIL 20 MG/1
20 TABLET ORAL DAILY PRN
Qty: 15 TABLET | Refills: 5 | Status: SHIPPED | OUTPATIENT
Start: 2024-04-11

## 2024-04-11 RX ORDER — TESTOSTERONE CYPIONATE 200 MG/ML
INJECTION, SOLUTION INTRAMUSCULAR
Qty: 10 ML | Refills: 1 | Status: SHIPPED | OUTPATIENT
Start: 2024-04-11

## 2024-04-11 NOTE — PROGRESS NOTES
Follow Up Office Visit      Date of Visit:  2024   Patient Name: Ibrahima Martinez  : 1965   MRN: [id number]     Chief Complaint:    Chief Complaint   Patient presents with    Low testosterone    Diabetes       History of Present Illness: Ibrahima Martinez is a 58 y.o. male who is here today for follow up.  Patient seen today for recheck on testosterone and diabetes.  Sugar is somewhat improved.  Testosterone level has greatly decreased when we change the level medication.  We also discussed his blood pressure today.  Wife is a nurse and he checks it often and does not get this way unless he is here in the office.  Whitecoat syndrome.        Subjective      Review of Systems:   Review of Systems   Constitutional:  Negative for fatigue and fever.   HENT:  Negative for congestion and ear pain.    Respiratory:  Negative for apnea, cough, chest tightness and shortness of breath.    Cardiovascular:  Negative for chest pain.   Gastrointestinal:  Negative for abdominal pain, constipation, diarrhea and nausea.   Musculoskeletal:  Negative for arthralgias.   Psychiatric/Behavioral:  Negative for depressed mood and stress.        Past Medical History:   Past Medical History:   Diagnosis Date    Back problem     Diabetes     Heart disease     Hypertension        Past Surgical History:   Past Surgical History:   Procedure Laterality Date    CARDIAC CATHETERIZATION      CORONARY ARTERY BYPASS GRAFT  2022    [place] to LAD, reverse saphenous vein graft to 1st diagonal, reverse saphenous vein graft to ramus intermeduiusand reverse saphenous vein graft sequential to PDA then to first right posterior lateral branch artery, then to the second posterolateral artery (Dr Yogi Mcgregor    CORONARY STENT PLACEMENT      EPIDURAL BLOCK      For right-sided sciatica pain.        Family History:   Family History   Problem Relation Age of Onset    Heart attack Father     Hypertension Father     Heart disease Father      Arthritis Mother        Social History:   Social History     Socioeconomic History    Marital status:    Tobacco Use    Smoking status: Some Days     Current packs/day: 1.00     Average packs/day: 1 pack/day for 15.0 years (15.0 ttl pk-yrs)     Types: Cigarettes    Smokeless tobacco: Never   Vaping Use    Vaping status: Never Used   Substance and Sexual Activity    Alcohol use: No    Drug use: Never    Sexual activity: Defer       Medications:     Current Outpatient Medications:     Testosterone Cypionate (DEPOTESTOTERONE CYPIONATE) 200 MG/ML injection, 1 ml every 10 days , im, Disp: 10 mL, Rfl: 1    Acetaminophen Extra Strength 500 MG tablet, , Disp: , Rfl:     albuterol sulfate  (90 Base) MCG/ACT inhaler, Inhale 2 puffs Every 4 (Four) Hours As Needed for Wheezing., Disp: 18 g, Rfl: 2    aspirin 81 MG tablet, Take  by mouth Daily., Disp: , Rfl:     atorvastatin (LIPITOR) 80 MG tablet, Take 1 tablet by mouth Daily., Disp: 90 tablet, Rfl: 1    clopidogrel (PLAVIX) 75 MG tablet, Take 1 tablet by mouth Daily., Disp: 90 tablet, Rfl: 1    furosemide (LASIX) 40 MG tablet, Take 1 tablet by mouth Daily., Disp: 120 tablet, Rfl: 3    gabapentin (NEURONTIN) 600 MG tablet, Take 1 tablet by mouth 4 (Four) Times a Day., Disp: 120 tablet, Rfl: 4    Glucose Blood (Blood Glucose Test Strips 333) strip, 1 strip by In Vitro route Daily., Disp: 100 strip, Rfl: 5    glucose monitor monitoring kit, Use 1 each Daily. Test fingerstick blood sugar daily prn diabetes, Disp: 1 each, Rfl: 0    ibuprofen (ADVIL,MOTRIN) 800 MG tablet, , Disp: , Rfl:     Lancets misc, Use 1 Lancet Daily., Disp: 100 each, Rfl: 5    metoprolol tartrate (LOPRESSOR) 50 MG tablet, Take 1 tablet by mouth 2 (Two) Times a Day., Disp: 180 tablet, Rfl: 1    mometasone-formoterol (DULERA 100) 100-5 MCG/ACT inhaler, Inhale 2 puffs 2 (Two) Times a Day., Disp: 1 each, Rfl: 5    nitroglycerin (NITROSTAT) 0.4 MG SL tablet, , Disp: , Rfl:     ONETOUCH DELICA  "LANCETS 33G misc, , Disp: , Rfl:     Oxymetazoline HCl (NASAL SPRAY NA), into the nostril(s) as directed by provider., Disp: , Rfl:     rOPINIRole (REQUIP) 0.25 MG tablet, Take 1 tablet by mouth Every Night. Take 1 hour before bedtime., Disp: 90 tablet, Rfl: 1    sildenafil (Viagra) 100 MG tablet, Take 1 tablet by mouth Daily As Needed for Erectile Dysfunction., Disp: 30 tablet, Rfl: 1    Stool Softener 100 MG capsule, , Disp: , Rfl:     tadalafil (Cialis) 20 MG tablet, Take 1 tablet by mouth Daily As Needed for Erectile Dysfunction., Disp: 15 tablet, Rfl: 5    tiotropium bromide monohydrate (SPIRIVA RESPIMAT) 2.5 MCG/ACT aerosol solution inhaler, Inhale 2 puffs Daily., Disp: 1 each, Rfl: 5    Allergies:   No Known Allergies    Objective     Physical Exam:  Vital Signs:   Vitals:    04/11/24 1323   BP: (!) 230/110   Pulse: 86   SpO2: 97%   Weight: 90.7 kg (200 lb)   Height: 175.3 cm (69\")     Body mass index is 29.53 kg/m².     Physical Exam  Vitals and nursing note reviewed.   Constitutional:       General: He is not in acute distress.     Appearance: Normal appearance. He is not ill-appearing.   HENT:      Head: Normocephalic and atraumatic.      Right Ear: Tympanic membrane and ear canal normal.      Left Ear: Tympanic membrane and ear canal normal.      Nose: Nose normal.   Cardiovascular:      Rate and Rhythm: Normal rate and regular rhythm.      Heart sounds: Normal heart sounds.   Pulmonary:      Effort: Pulmonary effort is normal.      Breath sounds: Normal breath sounds.   Neurological:      Mental Status: He is alert and oriented to person, place, and time. Mental status is at baseline.   Psychiatric:         Mood and Affect: Mood normal.         Procedures      Assessment / Plan      Assessment/Plan:   Diagnoses and all orders for this visit:    1. Type 2 diabetes mellitus with hyperglycemia, without long-term current use of insulin (Primary)  -     POC Glycosylated Hemoglobin (Hb A1C)  -     POCT " microalbumin    2. Male hypogonadism  -     Testosterone Cypionate (DEPOTESTOTERONE CYPIONATE) 200 MG/ML injection; 1 ml every 10 days , im  Dispense: 10 mL; Refill: 1    Other orders  -     tadalafil (Cialis) 20 MG tablet; Take 1 tablet by mouth Daily As Needed for Erectile Dysfunction.  Dispense: 15 tablet; Refill: 5         Adjustment made on his testosterone.  I want him to forward us some blood pressure readings.  I want to see some actual numbers.      Follow Up:   No follow-ups on file.    Chavo Duenas  [facility]

## 2024-04-30 ENCOUNTER — OFFICE VISIT (OUTPATIENT)
Dept: ORTHOPEDIC SURGERY | Facility: CLINIC | Age: 59
End: 2024-04-30
Payer: COMMERCIAL

## 2024-04-30 VITALS
WEIGHT: 199.96 LBS | SYSTOLIC BLOOD PRESSURE: 158 MMHG | HEIGHT: 69 IN | BODY MASS INDEX: 29.62 KG/M2 | DIASTOLIC BLOOD PRESSURE: 90 MMHG

## 2024-04-30 DIAGNOSIS — S76.311A PARTIAL HAMSTRING TEAR, RIGHT, INITIAL ENCOUNTER: ICD-10-CM

## 2024-04-30 DIAGNOSIS — M17.0 BILATERAL PRIMARY OSTEOARTHRITIS OF KNEE: Primary | ICD-10-CM

## 2024-04-30 PROCEDURE — 1160F RVW MEDS BY RX/DR IN RCRD: CPT | Performed by: STUDENT IN AN ORGANIZED HEALTH CARE EDUCATION/TRAINING PROGRAM

## 2024-04-30 PROCEDURE — 99213 OFFICE O/P EST LOW 20 MIN: CPT | Performed by: STUDENT IN AN ORGANIZED HEALTH CARE EDUCATION/TRAINING PROGRAM

## 2024-04-30 PROCEDURE — 20611 DRAIN/INJ JOINT/BURSA W/US: CPT | Performed by: STUDENT IN AN ORGANIZED HEALTH CARE EDUCATION/TRAINING PROGRAM

## 2024-04-30 PROCEDURE — 3080F DIAST BP >= 90 MM HG: CPT | Performed by: STUDENT IN AN ORGANIZED HEALTH CARE EDUCATION/TRAINING PROGRAM

## 2024-04-30 PROCEDURE — 1159F MED LIST DOCD IN RCRD: CPT | Performed by: STUDENT IN AN ORGANIZED HEALTH CARE EDUCATION/TRAINING PROGRAM

## 2024-04-30 PROCEDURE — 3077F SYST BP >= 140 MM HG: CPT | Performed by: STUDENT IN AN ORGANIZED HEALTH CARE EDUCATION/TRAINING PROGRAM

## 2024-04-30 RX ORDER — BUPIVACAINE HYDROCHLORIDE 2.5 MG/ML
4 INJECTION, SOLUTION EPIDURAL; INFILTRATION; INTRACAUDAL
Status: COMPLETED | OUTPATIENT
Start: 2024-04-30 | End: 2024-04-30

## 2024-04-30 RX ORDER — TRIAMCINOLONE ACETONIDE 40 MG/ML
2 INJECTION, SUSPENSION INTRA-ARTICULAR; INTRAMUSCULAR
Status: COMPLETED | OUTPATIENT
Start: 2024-04-30 | End: 2024-04-30

## 2024-04-30 RX ORDER — LIDOCAINE HYDROCHLORIDE 10 MG/ML
4 INJECTION, SOLUTION EPIDURAL; INFILTRATION; INTRACAUDAL; PERINEURAL
Status: COMPLETED | OUTPATIENT
Start: 2024-04-30 | End: 2024-04-30

## 2024-04-30 RX ADMIN — TRIAMCINOLONE ACETONIDE 2 ML: 40 INJECTION, SUSPENSION INTRA-ARTICULAR; INTRAMUSCULAR at 13:18

## 2024-04-30 RX ADMIN — BUPIVACAINE HYDROCHLORIDE 4 ML: 2.5 INJECTION, SOLUTION EPIDURAL; INFILTRATION; INTRACAUDAL at 13:18

## 2024-04-30 RX ADMIN — LIDOCAINE HYDROCHLORIDE 4 ML: 10 INJECTION, SOLUTION EPIDURAL; INFILTRATION; INTRACAUDAL; PERINEURAL at 13:18

## 2024-04-30 RX ADMIN — BUPIVACAINE HYDROCHLORIDE 4 ML: 2.5 INJECTION, SOLUTION EPIDURAL; INFILTRATION; INTRACAUDAL at 13:32

## 2024-04-30 RX ADMIN — TRIAMCINOLONE ACETONIDE 2 ML: 40 INJECTION, SUSPENSION INTRA-ARTICULAR; INTRAMUSCULAR at 13:32

## 2024-04-30 RX ADMIN — LIDOCAINE HYDROCHLORIDE 4 ML: 10 INJECTION, SOLUTION EPIDURAL; INFILTRATION; INTRACAUDAL; PERINEURAL at 13:32

## 2024-04-30 NOTE — PROGRESS NOTES
Procedure   - Large Joint Arthrocentesis: R greater trochanteric bursa on 4/30/2024 1:32 PM  Indications: pain  Details: 21 G needle, ultrasound-guided lateral approach  Medications: 4 mL bupivacaine (PF) 0.25 %; 2 mL triamcinolone acetonide 40 MG/ML; 4 mL lidocaine PF 1% 1 %  Outcome: tolerated well, no immediate complications  Procedure, treatment alternatives, risks and benefits explained, specific risks discussed. Consent was given by the patient. Immediately prior to procedure a time out was called to verify the correct patient, procedure, equipment, support staff and site/side marked as required. Patient was prepped and draped in the usual sterile fashion.

## 2024-04-30 NOTE — PROGRESS NOTES
Procedure   - Large Joint Arthrocentesis: R knee on 4/30/2024 1:18 PM  Indications: pain  Details: 22 G needle, ultrasound-guided anterolateral approach  Medications: 4 mL bupivacaine (PF) 0.25 %; 2 mL triamcinolone acetonide 40 MG/ML; 4 mL lidocaine PF 1% 1 %  Aspirate: 20 mL yellow and clear  Outcome: tolerated well, no immediate complications  Procedure, treatment alternatives, risks and benefits explained, specific risks discussed. Consent was given by the patient. Immediately prior to procedure a time out was called to verify the correct patient, procedure, equipment, support staff and site/side marked as required. Patient was prepped and draped in the usual sterile fashion.

## 2024-04-30 NOTE — PROGRESS NOTES
[facility] [place] [facility] Office Follow Up Visit     Office Follow Up      Date: 04/30/2024   Patient Name: Ibrahima Martinez  MRN: [id number]  YOB: 1965    Referring Physician: No ref. provider found     Chief Complaint:   Chief Complaint   Patient presents with    Follow-up     3 month f/u; Bilateral primary osteoarthritis of knee and MRI f/u right hip       History of Present Illness: Ibrahima Martinez is a 58 y.o. male who is here today for follow up on right hip pain.  She has localized pain to the lateral hip.  Pain is a 7/10.  He has had improvement in the lateral hip since last visit.  He did recent undergo MRI of the right hip as well and is here today to discuss those results.    Subjective   Review of Systems: Review of Systems   Constitutional:  Negative for chills, fever, unexpected weight gain and unexpected weight loss.   HENT:  Negative for congestion, postnasal drip and rhinorrhea.    Eyes:  Negative for blurred vision.   Respiratory:  Negative for shortness of breath.    Cardiovascular:  Negative for leg swelling.   Gastrointestinal:  Negative for abdominal pain, nausea and vomiting.   Genitourinary:  Negative for difficulty urinating.   Musculoskeletal:  Positive for arthralgias. Negative for gait problem, joint swelling and myalgias.   Skin:  Negative for skin lesions and wound.   Neurological:  Negative for dizziness, weakness, light-headedness and numbness.   Hematological:  Does not bruise/bleed easily.   Psychiatric/Behavioral:  Negative for depressed mood.         Medications:   Current Outpatient Medications:     Acetaminophen Extra Strength 500 MG tablet, , Disp: , Rfl:     albuterol sulfate  (90 Base) MCG/ACT inhaler, Inhale 2 puffs Every 4 (Four) Hours As Needed for Wheezing., Disp: 18 g, Rfl: 2    aspirin 81 MG tablet, Take  by mouth Daily., Disp: , Rfl:     atorvastatin (LIPITOR) 80 MG tablet, Take 1 tablet by mouth Daily., Disp:  90 tablet, Rfl: 1    clopidogrel (PLAVIX) 75 MG tablet, Take 1 tablet by mouth Daily., Disp: 90 tablet, Rfl: 1    furosemide (LASIX) 40 MG tablet, Take 1 tablet by mouth Daily., Disp: 120 tablet, Rfl: 3    gabapentin (NEURONTIN) 600 MG tablet, Take 1 tablet by mouth 4 (Four) Times a Day., Disp: 120 tablet, Rfl: 4    Glucose Blood (Blood Glucose Test Strips 333) strip, 1 strip by In Vitro route Daily., Disp: 100 strip, Rfl: 5    glucose monitor monitoring kit, Use 1 each Daily. Test fingerstick blood sugar daily prn diabetes, Disp: 1 each, Rfl: 0    ibuprofen (ADVIL,MOTRIN) 800 MG tablet, , Disp: , Rfl:     Lancets misc, Use 1 Lancet Daily., Disp: 100 each, Rfl: 5    metoprolol tartrate (LOPRESSOR) 50 MG tablet, Take 1 tablet by mouth 2 (Two) Times a Day., Disp: 180 tablet, Rfl: 1    mometasone-formoterol (DULERA 100) 100-5 MCG/ACT inhaler, Inhale 2 puffs 2 (Two) Times a Day., Disp: 1 each, Rfl: 5    nitroglycerin (NITROSTAT) 0.4 MG SL tablet, , Disp: , Rfl:     ONETOUCH DELICA LANCETS 33G misc, , Disp: , Rfl:     Oxymetazoline HCl (NASAL SPRAY NA), into the nostril(s) as directed by provider., Disp: , Rfl:     rOPINIRole (REQUIP) 0.25 MG tablet, Take 1 tablet by mouth Every Night. Take 1 hour before bedtime., Disp: 90 tablet, Rfl: 1    sildenafil (Viagra) 100 MG tablet, Take 1 tablet by mouth Daily As Needed for Erectile Dysfunction., Disp: 30 tablet, Rfl: 1    Stool Softener 100 MG capsule, , Disp: , Rfl:     tadalafil (Cialis) 20 MG tablet, Take 1 tablet by mouth Daily As Needed for Erectile Dysfunction., Disp: 15 tablet, Rfl: 5    Testosterone Cypionate (DEPOTESTOTERONE CYPIONATE) 200 MG/ML injection, 1 ml every 10 days , im, Disp: 10 mL, Rfl: 1    tiotropium bromide monohydrate (SPIRIVA RESPIMAT) 2.5 MCG/ACT aerosol solution inhaler, Inhale 2 puffs Daily., Disp: 1 each, Rfl: 5    Allergies: No Known Allergies    I have reviewed and updated the patient's chief complaint, history of present illness, review of  "systems, past medical history, surgical history, family history, social history, medications and allergy list as appropriate.     Objective    Vital Signs:   Vitals:    04/30/24 1258   BP: 158/90   Weight: 90.7 kg (199 lb 15.3 oz)   Height: 175.3 cm (69.02\")     Body mass index is 29.51 kg/m².  BMI is >= 25 and <30. (Overweight) The following options were offered after discussion;: exercise counseling/recommendations and nutrition counseling/recommendations    Patient reports that he is a former smoker. He quit smoking and has not resumed smoking since that time.  This behavior was applauded and she was encouraged to continue in smoking cessation.  We will continue to monitor at subsequent visits.     Ortho Exam:     Gait and Station: Appearance: ambulating with no assistive devices and limp.   Hip/Pelvis Appearance: Inspection: normal axial alignment and pelvis level.   Hips: Bony Palpation Right: tenderness of the greater trochanter. Bony Palpation Left: no tenderness of the iliac crest, the ASIS, the PSIS, the pubic tubercle, the sciatic notch, the ischial tuberosity, the SI joint, or the greater trochanter. Passive Range of Motion Right: no flexion contracture, hamstring tightness popliteal angle, or pain with motion and normal, flexion normal, extension normal, internal rotation normal, and external rotation normal. Passive Range of Motion Left: no flexion contracture, hamstring tightness popliteal angle, or pain with motion and normal, flexion normal, extension normal, internal rotation normal, and external rotation normal. Strength Right: normal 5/5. Strength Left: normal 5/5.   Skin: Right Lower Extremity: normal. Left Lower Extremity: normal.   right hip exam:   Normal hip contour without evidence of ecchymosis or swelling.   Range of motion of the hip shows pain only is exacerbated with Mac test, Straight Leg Raise.   Negative log roll, FADIR.   Hip flexion 110°, internal rotation 10°, external rotation " 60°.   Tenderness to palpation greatest at the greater trochanter region.   Mild tenderness along the iliotibial band.   Mild tenderness at the gluteal region.   Negative tenderness at the ischial tuberosity.   Negative SI joint tenderness to palpation.  Right knee: no erythema. +effusion. Able to fully flex and extend. 2+ posterior tibial pulse.     Results Review:   Imaging Results (Last 24 Hours)       ** No results found for the last 24 hours. **        MRI HIP RIGHT WO CONTRAST     Date of Exam: 4/2/2024 6:16 PM EDT     Indication: Hip pain, xray neg / oa, referred pain suspected.     Comparison: Right hip radiographs 10/31/2023     Technique:  Routine multiplanar/multisequence images of the right hip were obtained without contrast administration.          Findings:  The bone marrow signal intensity is within normal limits. No focal bony lesion. No evidence of stress or traumatic fracture or bony contusion. No evidence of femoral head avascular necrosis. The pubic symphysis and SI joints are unremarkable.     No evidence of hip joint effusion. The left hip which is only evaluated on the large field-of-view axial and coronal sequences demonstrates mild to moderate osteoarthritic change characterized by prominent subchondral cysts along the superolateral   acetabular rim; no evidence of high-grade or full-thickness chondral loss or high-grade joint space narrowing at the left hip. Small field-of-view images of the right hip demonstrate moderate osteoarthritic changes with diffuse, mostly moderate chondral   thinning throughout the superior joint with areas of moderate chondral fissuring without full-thickness chondral loss. There are small subchondral cysts along the rim of the superolateral acetabulum, with evidence of fraying and poorly defined low-grade   tearing of the superior labrum. There is tiny osteophytic change of the femoral head. The ligamentum teres is intact. The transverse acetabular ligament is  normal.     The anterior flexor tendons are normal. There is trace thin fluid deep to the muscle belly of the left iliacus and deep to the left iliopsoas myotendinous junction compatible with trace iliopsoas bursal fluid. No evidence of right-sided iliopsoas   bursitis.     There is tendinosis and moderate-grade partial tearing at the right hamstring tendon origin (series 6 image 34, series 11 image 23). The left hamstring tendon origin appears within normal limits.     Unremarkable appearance of the gluteal tendons. No trochanteric bursitis.     The external rotator tendons are normal. Grossly symmetric appearance of the muscles without evidence of conspicuous fatty atrophy or muscle strain. The abductor compartments appear normal.     Visualized portions of the sciatic nerves appear unremarkable. No acute or suspicious findings within the pelvis or partially imaged lower abdomen. There is sigmoid colonic diverticulosis. Mild enlargement of the prostate.     There is mild to moderate degenerative disc disease at L5-S1, with evidence of facet arthropathy and mild to moderate spinal canal narrowing in the lower lumbar spine.     IMPRESSION:  Impression:  Moderate osteoarthritic change of the right hip joint.     Tendinosis and moderate-grade partial tear at the left hamstring tendon origin.     Mild to moderate osteoarthritic change of the left hip joint     . Degenerative disc disease and facet arthropathy in the partially imaged lower lumbar spine.    Procedures    Assessment / Plan    Assessment/Plan:   Diagnoses and all orders for this visit:    1. Bilateral primary osteoarthritis of knee (Primary)    2. Partial hamstring tear, right, initial encounter    Other orders  -     - Large Joint Arthrocentesis: R knee  -     - Large Joint Arthrocentesis: R greater trochanteric bursa    Follow-up right hip and bilateral knee pain.  Knee pain from osteoarthritis is flared.  He would like repeat injections today.  MRI  right hip shows partial thickness tear of the hamstring at its origin.  No gluteal tendon tear.  Arthritic changes as before seen on radiographs.  Discussed his imaging findings.  Continues to endorse lateral hip pain worsened by direct palpation and weightbearing activity.  Today, we will repeat corticosteroid injection into the right knee under ultrasound guidance as well as into the Subglute max bursa of the right hip.  20 cc of joint fluid were removed from the right knee as well. He will follow-up in 1 week for the left knee.  If symptoms not improved in the hip in the future, can consider an ischial bursa injection.    Follow Up:   No follow-ups on file.      Harshil Ag MD  [facility] [place] [facility]

## 2024-05-01 DIAGNOSIS — I10 PRIMARY HYPERTENSION: ICD-10-CM

## 2024-05-01 DIAGNOSIS — I25.10 ATHEROSCLEROSIS OF CORONARY ARTERY OF NATIVE HEART WITHOUT ANGINA PECTORIS, UNSPECIFIED VESSEL OR LESION TYPE: ICD-10-CM

## 2024-05-02 DIAGNOSIS — I25.10 ATHEROSCLEROSIS OF CORONARY ARTERY OF NATIVE HEART WITHOUT ANGINA PECTORIS, UNSPECIFIED VESSEL OR LESION TYPE: ICD-10-CM

## 2024-05-02 DIAGNOSIS — I10 PRIMARY HYPERTENSION: ICD-10-CM

## 2024-05-02 RX ORDER — ATORVASTATIN CALCIUM 80 MG/1
80 TABLET, FILM COATED ORAL DAILY
Qty: 90 TABLET | Refills: 0 | Status: SHIPPED | OUTPATIENT
Start: 2024-05-02

## 2024-05-02 RX ORDER — METOPROLOL TARTRATE 50 MG/1
50 TABLET, FILM COATED ORAL 2 TIMES DAILY
Qty: 180 TABLET | Refills: 1 | Status: SHIPPED | OUTPATIENT
Start: 2024-05-02

## 2024-05-02 RX ORDER — CLOPIDOGREL BISULFATE 75 MG/1
75 TABLET ORAL DAILY
Qty: 90 TABLET | Refills: 0 | Status: SHIPPED | OUTPATIENT
Start: 2024-05-02

## 2024-05-07 ENCOUNTER — OFFICE VISIT (OUTPATIENT)
Dept: ORTHOPEDIC SURGERY | Facility: CLINIC | Age: 59
End: 2024-05-07
Payer: COMMERCIAL

## 2024-05-07 DIAGNOSIS — M17.0 BILATERAL PRIMARY OSTEOARTHRITIS OF KNEE: Primary | ICD-10-CM

## 2024-05-07 PROCEDURE — 1160F RVW MEDS BY RX/DR IN RCRD: CPT | Performed by: STUDENT IN AN ORGANIZED HEALTH CARE EDUCATION/TRAINING PROGRAM

## 2024-05-07 PROCEDURE — 20611 DRAIN/INJ JOINT/BURSA W/US: CPT | Performed by: STUDENT IN AN ORGANIZED HEALTH CARE EDUCATION/TRAINING PROGRAM

## 2024-05-07 PROCEDURE — 1159F MED LIST DOCD IN RCRD: CPT | Performed by: STUDENT IN AN ORGANIZED HEALTH CARE EDUCATION/TRAINING PROGRAM

## 2024-05-07 RX ORDER — LIDOCAINE HYDROCHLORIDE 10 MG/ML
4 INJECTION, SOLUTION EPIDURAL; INFILTRATION; INTRACAUDAL; PERINEURAL
Status: COMPLETED | OUTPATIENT
Start: 2024-05-07 | End: 2024-05-07

## 2024-05-07 RX ORDER — TRIAMCINOLONE ACETONIDE 40 MG/ML
80 INJECTION, SUSPENSION INTRA-ARTICULAR; INTRAMUSCULAR
Status: COMPLETED | OUTPATIENT
Start: 2024-05-07 | End: 2024-05-07

## 2024-05-07 RX ORDER — BUPIVACAINE HYDROCHLORIDE 2.5 MG/ML
4 INJECTION, SOLUTION EPIDURAL; INFILTRATION; INTRACAUDAL
Status: COMPLETED | OUTPATIENT
Start: 2024-05-07 | End: 2024-05-07

## 2024-05-07 RX ADMIN — BUPIVACAINE HYDROCHLORIDE 4 ML: 2.5 INJECTION, SOLUTION EPIDURAL; INFILTRATION; INTRACAUDAL at 13:45

## 2024-05-07 RX ADMIN — LIDOCAINE HYDROCHLORIDE 4 ML: 10 INJECTION, SOLUTION EPIDURAL; INFILTRATION; INTRACAUDAL; PERINEURAL at 13:45

## 2024-05-07 RX ADMIN — TRIAMCINOLONE ACETONIDE 80 MG: 40 INJECTION, SUSPENSION INTRA-ARTICULAR; INTRAMUSCULAR at 13:45

## 2024-07-22 ENCOUNTER — TELEPHONE (OUTPATIENT)
Dept: ORTHOPEDIC SURGERY | Facility: CLINIC | Age: 59
End: 2024-07-22

## 2024-07-22 DIAGNOSIS — M17.11 PRIMARY OSTEOARTHRITIS OF RIGHT KNEE: Primary | ICD-10-CM

## 2024-07-22 NOTE — TELEPHONE ENCOUNTER
Eileen-can you place Visco order for this Dr. Ag patient for right knee? Thanks!    Spoke to pt to make sure he was aware that we would not be able to do gel injections in the hip; only the knee; He did correct the laterality and states it is the right knee that he is requesting the gel injection for. I told him Dr. Ag was out today and tomorrow but that I would have one of the PAs go ahead and place the order for the Visco for the right knee and that we would give him a call once the insurance gets back to us on the authorization. He verbalized understanding.    Therese BUCKLEY CMA ([facility]), ROT

## 2024-07-22 NOTE — TELEPHONE ENCOUNTER
Provider: RADHA    Caller: CAYLA    Relationship to Patient: SELF    Pharmacy:     Phone Number: [phone]    Reason for Call: PT CALLING TO GET A GEL INJ IN HIS LEFT KNEE AND RIGHT HIP STATES THAT THE JONATAN INJ IS NO LONGER HELPING - PT ALSO STATES HE HAS SWELLING IN LEFT KNEE BUT DID NOT WANT TO SPEAK TO A NURSE AND WOULD LIKE TO GET ASAP WITH DR GARCIA IN [place] FOR THE GEL INJ

## 2024-07-23 ENCOUNTER — OFFICE VISIT (OUTPATIENT)
Dept: FAMILY MEDICINE CLINIC | Facility: CLINIC | Age: 59
End: 2024-07-23
Payer: COMMERCIAL

## 2024-07-23 VITALS
WEIGHT: 201 LBS | SYSTOLIC BLOOD PRESSURE: 166 MMHG | BODY MASS INDEX: 29.77 KG/M2 | DIASTOLIC BLOOD PRESSURE: 100 MMHG | OXYGEN SATURATION: 98 % | HEART RATE: 76 BPM | HEIGHT: 69 IN

## 2024-07-23 DIAGNOSIS — E11.65 TYPE 2 DIABETES MELLITUS WITH HYPERGLYCEMIA, WITHOUT LONG-TERM CURRENT USE OF INSULIN: Primary | ICD-10-CM

## 2024-07-23 DIAGNOSIS — G89.29 CHRONIC PAIN OF BOTH KNEES: ICD-10-CM

## 2024-07-23 DIAGNOSIS — M25.561 CHRONIC PAIN OF RIGHT KNEE: ICD-10-CM

## 2024-07-23 DIAGNOSIS — G89.29 CHRONIC PAIN OF RIGHT KNEE: ICD-10-CM

## 2024-07-23 DIAGNOSIS — I25.10 ATHEROSCLEROSIS OF CORONARY ARTERY OF NATIVE HEART WITHOUT ANGINA PECTORIS, UNSPECIFIED VESSEL OR LESION TYPE: ICD-10-CM

## 2024-07-23 DIAGNOSIS — M25.561 CHRONIC PAIN OF BOTH KNEES: ICD-10-CM

## 2024-07-23 DIAGNOSIS — M25.562 CHRONIC PAIN OF BOTH KNEES: ICD-10-CM

## 2024-07-23 DIAGNOSIS — M54.16 LUMBAR RADICULOPATHY: ICD-10-CM

## 2024-07-23 DIAGNOSIS — I10 PRIMARY HYPERTENSION: ICD-10-CM

## 2024-07-23 LAB
EXPIRATION DATE: ABNORMAL
HBA1C MFR BLD: 6.4 % (ref 4.5–5.7)
Lab: ABNORMAL

## 2024-07-23 PROCEDURE — 3080F DIAST BP >= 90 MM HG: CPT | Performed by: FAMILY MEDICINE

## 2024-07-23 PROCEDURE — 83036 HEMOGLOBIN GLYCOSYLATED A1C: CPT | Performed by: FAMILY MEDICINE

## 2024-07-23 PROCEDURE — 3077F SYST BP >= 140 MM HG: CPT | Performed by: FAMILY MEDICINE

## 2024-07-23 PROCEDURE — 3044F HG A1C LEVEL LT 7.0%: CPT | Performed by: FAMILY MEDICINE

## 2024-07-23 PROCEDURE — 99214 OFFICE O/P EST MOD 30 MIN: CPT | Performed by: FAMILY MEDICINE

## 2024-07-23 RX ORDER — ATORVASTATIN CALCIUM 80 MG/1
80 TABLET, FILM COATED ORAL DAILY
Qty: 90 TABLET | Refills: 1 | Status: SHIPPED | OUTPATIENT
Start: 2024-07-23

## 2024-07-23 RX ORDER — GABAPENTIN 600 MG/1
600 TABLET ORAL 4 TIMES DAILY
Qty: 120 TABLET | Refills: 3 | Status: SHIPPED | OUTPATIENT
Start: 2024-07-23

## 2024-07-23 RX ORDER — CLOPIDOGREL BISULFATE 75 MG/1
75 TABLET ORAL DAILY
Qty: 90 TABLET | Refills: 1 | Status: SHIPPED | OUTPATIENT
Start: 2024-07-23

## 2024-07-23 RX ORDER — ROPINIROLE 0.25 MG/1
0.25 TABLET, FILM COATED ORAL NIGHTLY
Qty: 90 TABLET | Refills: 1 | Status: SHIPPED | OUTPATIENT
Start: 2024-07-23

## 2024-07-23 RX ORDER — AMLODIPINE BESYLATE 10 MG/1
10 TABLET ORAL DAILY
Qty: 30 TABLET | Refills: 5 | Status: SHIPPED | OUTPATIENT
Start: 2024-07-23

## 2024-07-23 RX ORDER — HYDROCODONE BITARTRATE AND ACETAMINOPHEN 7.5; 325 MG/1; MG/1
1 TABLET ORAL EVERY 6 HOURS PRN
Qty: 45 TABLET | Refills: 0 | Status: SHIPPED | OUTPATIENT
Start: 2024-07-23
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
## 2024-08-16 DIAGNOSIS — M25.561 CHRONIC PAIN OF RIGHT KNEE: ICD-10-CM

## 2024-08-16 DIAGNOSIS — G89.29 CHRONIC PAIN OF RIGHT KNEE: ICD-10-CM

## 2024-08-16 NOTE — TELEPHONE ENCOUNTER
Caller: Ibrahima Martinez    Relationship: Self    Best call back number: [phone]     Requested Prescriptions:   Requested Prescriptions     Pending Prescriptions Disp Refills    HYDROcodone-acetaminophen (NORCO) 7.5-325 MG per tablet 45 tablet 0     Sig: Take 1 tablet by mouth Every 6 (Six) Hours As Needed for Moderate Pain.        Pharmacy where request should be sent: [facility] [address] [phone] [facility] [phone]      Last office visit with prescribing clinician: 7/23/2024   Last telemedicine visit with prescribing clinician: Visit date not found   Next office visit with prescribing clinician: 9/24/2024     Additional details provided by patient: OUT OF MEDICATION. DOES NOT SEE ORTHO DR UNTIL NEXT WEEK     Does the patient have less than a 3 day supply:  [x] Yes  [] No    Would you like a call back once the refill request has been completed: [] Yes [x] No    If the office needs to give you a call back, can they leave a voicemail: [] Yes [x] No    King Damon   08/16/24 13:11 EDT

## 2024-08-19 RX ORDER — HYDROCODONE BITARTRATE AND ACETAMINOPHEN 7.5; 325 MG/1; MG/1
1 TABLET ORAL EVERY 6 HOURS PRN
Qty: 45 TABLET | Refills: 0 | Status: SHIPPED | OUTPATIENT
Start: 2024-08-19

## 2024-08-20 ENCOUNTER — OFFICE VISIT (OUTPATIENT)
Dept: ORTHOPEDIC SURGERY | Facility: CLINIC | Age: 59
End: 2024-08-20
Payer: COMMERCIAL

## 2024-08-20 VITALS
BODY MASS INDEX: 29.78 KG/M2 | SYSTOLIC BLOOD PRESSURE: 164 MMHG | WEIGHT: 201.06 LBS | DIASTOLIC BLOOD PRESSURE: 80 MMHG | HEIGHT: 69 IN

## 2024-08-20 DIAGNOSIS — S76.311D PARTIAL HAMSTRING TEAR, RIGHT, SUBSEQUENT ENCOUNTER: ICD-10-CM

## 2024-08-20 DIAGNOSIS — M76.01 GLUTEAL TENDINITIS OF RIGHT BUTTOCK: Primary | ICD-10-CM

## 2024-08-20 DIAGNOSIS — M16.11 PRIMARY OSTEOARTHRITIS OF RIGHT HIP: ICD-10-CM

## 2024-08-20 PROCEDURE — 20611 DRAIN/INJ JOINT/BURSA W/US: CPT | Performed by: STUDENT IN AN ORGANIZED HEALTH CARE EDUCATION/TRAINING PROGRAM

## 2024-08-20 PROCEDURE — 99213 OFFICE O/P EST LOW 20 MIN: CPT | Performed by: STUDENT IN AN ORGANIZED HEALTH CARE EDUCATION/TRAINING PROGRAM

## 2024-08-20 PROCEDURE — 3077F SYST BP >= 140 MM HG: CPT | Performed by: STUDENT IN AN ORGANIZED HEALTH CARE EDUCATION/TRAINING PROGRAM

## 2024-08-20 PROCEDURE — 1159F MED LIST DOCD IN RCRD: CPT | Performed by: STUDENT IN AN ORGANIZED HEALTH CARE EDUCATION/TRAINING PROGRAM

## 2024-08-20 PROCEDURE — 1160F RVW MEDS BY RX/DR IN RCRD: CPT | Performed by: STUDENT IN AN ORGANIZED HEALTH CARE EDUCATION/TRAINING PROGRAM

## 2024-08-20 PROCEDURE — 3079F DIAST BP 80-89 MM HG: CPT | Performed by: STUDENT IN AN ORGANIZED HEALTH CARE EDUCATION/TRAINING PROGRAM

## 2024-08-20 RX ORDER — TRIAMCINOLONE ACETONIDE 40 MG/ML
80 INJECTION, SUSPENSION INTRA-ARTICULAR; INTRAMUSCULAR
Status: COMPLETED | OUTPATIENT
Start: 2024-08-20 | End: 2024-08-20

## 2024-08-20 RX ORDER — LIDOCAINE HYDROCHLORIDE 10 MG/ML
2 INJECTION, SOLUTION EPIDURAL; INFILTRATION; INTRACAUDAL; PERINEURAL
Status: COMPLETED | OUTPATIENT
Start: 2024-08-20 | End: 2024-08-20

## 2024-08-20 RX ORDER — BUPIVACAINE HYDROCHLORIDE 2.5 MG/ML
2 INJECTION, SOLUTION EPIDURAL; INFILTRATION; INTRACAUDAL
Status: COMPLETED | OUTPATIENT
Start: 2024-08-20 | End: 2024-08-20

## 2024-08-20 RX ADMIN — LIDOCAINE HYDROCHLORIDE 2 ML: 10 INJECTION, SOLUTION EPIDURAL; INFILTRATION; INTRACAUDAL; PERINEURAL at 14:27

## 2024-08-20 RX ADMIN — BUPIVACAINE HYDROCHLORIDE 2 ML: 2.5 INJECTION, SOLUTION EPIDURAL; INFILTRATION; INTRACAUDAL at 14:27

## 2024-08-20 RX ADMIN — TRIAMCINOLONE ACETONIDE 80 MG: 40 INJECTION, SUSPENSION INTRA-ARTICULAR; INTRAMUSCULAR at 14:27

## 2024-08-20 NOTE — PROGRESS NOTES
[facility] [place] [facility] Office Follow Up Visit     Office Follow Up      Date: 08/20/2024   Patient Name: Ibrahima Martinez  MRN: [id number]  YOB: 1965    Referring Physician: No ref. provider found     Chief Complaint:   Chief Complaint   Patient presents with    Follow-up     4 month f/u right hip     History of Present Illness: Ibrahima Martinez is a 59 y.o. male who returns to clinic today for follow up on right hip pain. His pain is a 8 /10 on the pain scale. Patient has tried the following previous treatments injections: Cortisone 4/2024 He mentions current symptoms of same as prior . He states that these treatments have stayed the same.    Subjective     Review of Systems: Review of Systems   Constitutional:  Negative for chills, fever, unexpected weight gain and unexpected weight loss.   HENT:  Negative for congestion, postnasal drip and rhinorrhea.    Eyes:  Negative for blurred vision.   Respiratory:  Negative for shortness of breath.    Cardiovascular:  Negative for leg swelling.   Gastrointestinal:  Negative for abdominal pain, nausea and vomiting.   Genitourinary:  Negative for difficulty urinating.   Musculoskeletal:  Positive for arthralgias. Negative for gait problem, joint swelling and myalgias.   Skin:  Negative for skin lesions and wound.   Neurological:  Negative for dizziness, weakness, light-headedness and numbness.   Hematological:  Does not bruise/bleed easily.   Psychiatric/Behavioral:  Negative for depressed mood.         Medications:   Current Outpatient Medications:     Acetaminophen Extra Strength 500 MG tablet, , Disp: , Rfl:     albuterol sulfate  (90 Base) MCG/ACT inhaler, Inhale 2 puffs Every 4 (Four) Hours As Needed for Wheezing., Disp: 18 g, Rfl: 2    amLODIPine (NORVASC) 10 MG tablet, Take 1 tablet by mouth Daily., Disp: 30 tablet, Rfl: 5    aspirin 81 MG tablet, Take  by mouth Daily., Disp: , Rfl:     atorvastatin (LIPITOR)  80 MG tablet, Take 1 tablet by mouth Daily., Disp: 90 tablet, Rfl: 1    clopidogrel (PLAVIX) 75 MG tablet, Take 1 tablet by mouth Daily., Disp: 90 tablet, Rfl: 1    furosemide (LASIX) 40 MG tablet, Take 1 tablet by mouth Daily., Disp: 120 tablet, Rfl: 3    gabapentin (NEURONTIN) 600 MG tablet, Take 1 tablet by mouth 4 (Four) Times a Day., Disp: 120 tablet, Rfl: 3    Glucose Blood (Blood Glucose Test Strips 333) strip, 1 strip by In Vitro route Daily., Disp: 100 strip, Rfl: 5    glucose monitor monitoring kit, Use 1 each Daily. Test fingerstick blood sugar daily prn diabetes, Disp: 1 each, Rfl: 0    HYDROcodone-acetaminophen (NORCO) 7.5-325 MG per tablet, Take 1 tablet by mouth Every 6 (Six) Hours As Needed for Moderate Pain., Disp: 45 tablet, Rfl: 0    ibuprofen (ADVIL,MOTRIN) 800 MG tablet, , Disp: , Rfl:     Lancets misc, Use 1 Lancet Daily., Disp: 100 each, Rfl: 5    metoprolol tartrate (LOPRESSOR) 50 MG tablet, TAKE 1 TABLET BY MOUTH 2 TIMES A DAY, Disp: 180 tablet, Rfl: 1    mometasone-formoterol (DULERA 100) 100-5 MCG/ACT inhaler, Inhale 2 puffs 2 (Two) Times a Day., Disp: 1 each, Rfl: 5    nitroglycerin (NITROSTAT) 0.4 MG SL tablet, , Disp: , Rfl:     ONETOUCH DELICA LANCETS 33G misc, , Disp: , Rfl:     Oxymetazoline HCl (NASAL SPRAY NA), into the nostril(s) as directed by provider., Disp: , Rfl:     rOPINIRole (REQUIP) 0.25 MG tablet, Take 1 tablet by mouth Every Night. Take 1 hour before bedtime., Disp: 90 tablet, Rfl: 1    sildenafil (Viagra) 100 MG tablet, Take 1 tablet by mouth Daily As Needed for Erectile Dysfunction., Disp: 30 tablet, Rfl: 1    Stool Softener 100 MG capsule, , Disp: , Rfl:     tadalafil (Cialis) 20 MG tablet, Take 1 tablet by mouth Daily As Needed for Erectile Dysfunction., Disp: 15 tablet, Rfl: 5    Testosterone Cypionate (DEPOTESTOTERONE CYPIONATE) 200 MG/ML injection, 1 ml every 10 days , im, Disp: 10 mL, Rfl: 1    Allergies: No Known Allergies    I have reviewed and updated the  "patient's chief complaint, history of present illness, review of systems, past medical history, surgical history, family history, social history, medications and allergy list as appropriate.     Objective      Vital Signs:   Vitals:    08/20/24 1344   BP: 164/80   Weight: 91.2 kg (201 lb 1 oz)   Height: 175.3 cm (69.02\")     Body mass index is 29.68 kg/m².  BMI is >= 30 and <35. (Class 1 Obesity). The following options were offered after discussion;: exercise counseling/recommendations and nutrition counseling/recommendations     Patient reports that he is a former smoker. He quit smoking and has not resumed smoking since that time.  This behavior was applauded and she was encouraged to continue in smoking cessation.  We will continue to monitor at subsequent visits.    Ortho Exam:   Gait and Station: Appearance: antalgic gait.   Cardiovascular System: Arterial Pulses Right: dorsalis pedis pulse normal. Varicosities Right: capillary refill test normal.   Lumbar Spine: Inspection: normal alignment.   Hip/Pelvis Appearance: Inspection: normal axial alignment.   Hips: Bony Palpation Right: no tenderness of the greater trochanter. Soft Tissue Palpation Right: tenderness of the hip flexor muscles. Active Range of Motion Right: limited (secondary to pain especially flexion and rotation). Strength Right: normal 5/5.   +FADIR.  Skin: Right Lower Extremity: normal. Left Lower Extremity: normal.   Neurologic: Sensation on the Right: L1 normal, L2 normal, L3 normal, L4 normal, and S2 normal. Sensation on the Left: L1 normal, L2 normal, L3 normal, L4 normal, and S2 normal.    Results Review:   Imaging Results (Last 24 Hours)       ** No results found for the last 24 hours. **            Procedures    Assessment / Plan      Assessment/Plan:   Diagnoses and all orders for this visit:    1. Gluteal tendinitis of right buttock (Primary)    2. Partial hamstring tear, right, subsequent encounter    3. Primary osteoarthritis of right " hip    Other orders  -     - Large Joint Arthrocentesis: R hip joint    Follow-up on right hip pain.  Previous MRI showed partial tear of the hamstring at its origin, degenerative changes in the hip joint, and gluteal tendinitis versus partial tears laterally.  We have previously done a corticosteroid injection at the lateral hip.  This provided a few weeks of relief.  He is also been taking Tylenol and hydrocodone.  Today, pain is posteriorly but appears to be having some intra-articular findings today.  Therefore, we will inject the hip joint with corticosteroid under ultrasound guidance today.  Continue with stretching and range of motion.  Follow-up as needed.    Follow Up:   Return if symptoms worsen or fail to improve.      Harshil Ag MD  [facility] [place] [facility]

## 2024-08-20 NOTE — PROGRESS NOTES
Procedure   - Large Joint Arthrocentesis: R hip joint on 8/20/2024 2:27 PM  Indications: pain  Details: 21 G needle, ultrasound-guided anterior approach  Medications: 2 mL bupivacaine (PF) 0.25 %; 2 mL lidocaine PF 1% 1 %; 80 mg triamcinolone acetonide 40 MG/ML  Outcome: tolerated well, no immediate complications  Procedure, treatment alternatives, risks and benefits explained, specific risks discussed. Consent was given by the patient. Immediately prior to procedure a time out was called to verify the correct patient, procedure, equipment, support staff and site/side marked as required. Patient was prepped and draped in the usual sterile fashion.

## 2024-08-27 ENCOUNTER — CLINICAL SUPPORT (OUTPATIENT)
Dept: ORTHOPEDIC SURGERY | Facility: CLINIC | Age: 59
End: 2024-08-27
Payer: COMMERCIAL

## 2024-08-27 DIAGNOSIS — M17.11 PRIMARY OSTEOARTHRITIS OF RIGHT KNEE: Primary | ICD-10-CM

## 2024-08-27 RX ORDER — LIDOCAINE HYDROCHLORIDE 10 MG/ML
4 INJECTION, SOLUTION EPIDURAL; INFILTRATION; INTRACAUDAL; PERINEURAL
Status: COMPLETED | OUTPATIENT
Start: 2024-08-27 | End: 2024-08-27

## 2024-08-27 RX ADMIN — LIDOCAINE HYDROCHLORIDE 4 ML: 10 INJECTION, SOLUTION EPIDURAL; INFILTRATION; INTRACAUDAL; PERINEURAL at 14:31

## 2024-08-27 NOTE — PROGRESS NOTES
Procedure   - Large Joint Arthrocentesis: R knee on 8/27/2024 2:31 PM  Indications: pain  Details: 21 G needle, ultrasound-guided anterolateral approach  Medications: 4 mL lidocaine PF 1% 1 %; 30 mg Cross-Linked Hyaluronate 30 MG/3ML  Outcome: tolerated well, no immediate complications  Procedure, treatment alternatives, risks and benefits explained, specific risks discussed. Consent was given by the patient. Immediately prior to procedure a time out was called to verify the correct patient, procedure, equipment, support staff and site/side marked as required. Patient was prepped and draped in the usual sterile fashion.          59-year-old male with right knee pain from right primary knee osteoarthritis.  Here today for ultrasound-guided Gel One injection #1.  Ultrasound guidance used for proper needle placement.  Procedure was explained in detail prior to starting.  All questions answered to the best of my ability.  Procedure was completed without complication.  See procedure note above.  He will follow up as his symptoms dictate.

## 2024-09-10 ENCOUNTER — OFFICE VISIT (OUTPATIENT)
Dept: ORTHOPEDIC SURGERY | Facility: CLINIC | Age: 59
End: 2024-09-10
Payer: COMMERCIAL

## 2024-09-10 VITALS
HEIGHT: 69 IN | BODY MASS INDEX: 29.78 KG/M2 | DIASTOLIC BLOOD PRESSURE: 86 MMHG | WEIGHT: 201.06 LBS | SYSTOLIC BLOOD PRESSURE: 150 MMHG

## 2024-09-10 DIAGNOSIS — M75.121 NONTRAUMATIC COMPLETE TEAR OF RIGHT ROTATOR CUFF: ICD-10-CM

## 2024-09-10 DIAGNOSIS — M19.011 ARTHRITIS OF RIGHT GLENOHUMERAL JOINT: ICD-10-CM

## 2024-09-10 DIAGNOSIS — M25.511 RIGHT SHOULDER PAIN, UNSPECIFIED CHRONICITY: Primary | ICD-10-CM

## 2024-09-10 PROCEDURE — 1160F RVW MEDS BY RX/DR IN RCRD: CPT | Performed by: STUDENT IN AN ORGANIZED HEALTH CARE EDUCATION/TRAINING PROGRAM

## 2024-09-10 PROCEDURE — 3077F SYST BP >= 140 MM HG: CPT | Performed by: STUDENT IN AN ORGANIZED HEALTH CARE EDUCATION/TRAINING PROGRAM

## 2024-09-10 PROCEDURE — 3079F DIAST BP 80-89 MM HG: CPT | Performed by: STUDENT IN AN ORGANIZED HEALTH CARE EDUCATION/TRAINING PROGRAM

## 2024-09-10 PROCEDURE — 1159F MED LIST DOCD IN RCRD: CPT | Performed by: STUDENT IN AN ORGANIZED HEALTH CARE EDUCATION/TRAINING PROGRAM

## 2024-09-10 PROCEDURE — 99214 OFFICE O/P EST MOD 30 MIN: CPT | Performed by: STUDENT IN AN ORGANIZED HEALTH CARE EDUCATION/TRAINING PROGRAM

## 2024-09-10 NOTE — PROGRESS NOTES
[facility] [place] [facility] Office Visit     Office Visit       Date: 09/10/2024   Patient Name: Ibrahima Martinez  MRN: [id number]  YOB: 1965    Referring Physician: No ref. provider found     Chief Complaint:   Chief Complaint   Patient presents with    Right Shoulder - Pain     History of Present Illness:   Ibrahima Martinez is a 59 y.o. male who presents with new problem of: right shoulder pain.  Onset: atraumatic and gradual in nature. The issue has been ongoing for 2 year(s). Pain is a 7/10 on the pain scale. Pain is described as aching, burning, stabbing, and shooting. Associated symptoms include pain, stiffness, and numbness/tingling . The pain is worse with working, lying on affected side, and any movement of the joint; nothing improves the pain. Previous treatments have included: nothing.    Subjective   Review of Systems: Review of Systems   Constitutional:  Negative for chills, fever, unexpected weight gain and unexpected weight loss.   HENT:  Negative for congestion, postnasal drip and rhinorrhea.    Eyes:  Negative for blurred vision.   Respiratory:  Negative for shortness of breath.    Cardiovascular:  Negative for leg swelling.   Gastrointestinal:  Negative for abdominal pain, nausea and vomiting.   Genitourinary:  Negative for difficulty urinating.   Musculoskeletal:  Positive for arthralgias. Negative for gait problem, joint swelling and myalgias.   Skin:  Negative for skin lesions and wound.   Neurological:  Negative for dizziness, weakness, light-headedness and numbness.   Hematological:  Does not bruise/bleed easily.   Psychiatric/Behavioral:  Negative for depressed mood.         I have reviewed the following portions of the patient's history:History of Present Illness and review of systems.    Past Medical History:   Past Medical History:   Diagnosis Date    Back problem     Diabetes     Heart disease     Hypertension        Past Surgical History:    Past Surgical History:   Procedure Laterality Date    CARDIAC CATHETERIZATION      CORONARY ARTERY BYPASS GRAFT  03/28/2022    Lima to LAD, reverse saphenous vein graft to 1st diagonal, reverse saphenous vein graft to ramus intermeduiusand reverse saphenous vein graft sequential to PDA then to first right posterior lateral branch artery, then to the second posterolateral artery (Dr Yogi Mcgregor    CORONARY STENT PLACEMENT      EPIDURAL BLOCK  2021    For right-sided sciatica pain.        Family History:   Family History   Problem Relation Age of Onset    Heart attack Father     Hypertension Father     Heart disease Father     Arthritis Mother        Social History:   Social History     Socioeconomic History    Marital status:    Tobacco Use    Smoking status: Some Days     Current packs/day: 1.00     Average packs/day: 1 pack/day for 15.0 years (15.0 ttl pk-yrs)     Types: Cigarettes    Smokeless tobacco: Never   Vaping Use    Vaping status: Never Used   Substance and Sexual Activity    Alcohol use: No    Drug use: Never    Sexual activity: Defer       Medications:   Current Outpatient Medications:     Acetaminophen Extra Strength 500 MG tablet, , Disp: , Rfl:     albuterol sulfate  (90 Base) MCG/ACT inhaler, Inhale 2 puffs Every 4 (Four) Hours As Needed for Wheezing., Disp: 18 g, Rfl: 2    amLODIPine (NORVASC) 10 MG tablet, Take 1 tablet by mouth Daily., Disp: 30 tablet, Rfl: 5    aspirin 81 MG tablet, Take  by mouth Daily., Disp: , Rfl:     atorvastatin (LIPITOR) 80 MG tablet, Take 1 tablet by mouth Daily., Disp: 90 tablet, Rfl: 1    clopidogrel (PLAVIX) 75 MG tablet, Take 1 tablet by mouth Daily., Disp: 90 tablet, Rfl: 1    furosemide (LASIX) 40 MG tablet, Take 1 tablet by mouth Daily., Disp: 120 tablet, Rfl: 3    gabapentin (NEURONTIN) 600 MG tablet, Take 1 tablet by mouth 4 (Four) Times a Day., Disp: 120 tablet, Rfl: 3    Glucose Blood (Blood Glucose Test Strips 333) strip, 1 strip by In Vitro  "route Daily., Disp: 100 strip, Rfl: 5    glucose monitor monitoring kit, Use 1 each Daily. Test fingerstick blood sugar daily prn diabetes, Disp: 1 each, Rfl: 0    HYDROcodone-acetaminophen (NORCO) 7.5-325 MG per tablet, Take 1 tablet by mouth Every 6 (Six) Hours As Needed for Moderate Pain., Disp: 45 tablet, Rfl: 0    ibuprofen (ADVIL,MOTRIN) 800 MG tablet, , Disp: , Rfl:     Lancets misc, Use 1 Lancet Daily., Disp: 100 each, Rfl: 5    metoprolol tartrate (LOPRESSOR) 50 MG tablet, TAKE 1 TABLET BY MOUTH 2 TIMES A DAY, Disp: 180 tablet, Rfl: 1    mometasone-formoterol (DULERA 100) 100-5 MCG/ACT inhaler, Inhale 2 puffs 2 (Two) Times a Day., Disp: 1 each, Rfl: 5    nitroglycerin (NITROSTAT) 0.4 MG SL tablet, , Disp: , Rfl:     ONETOUCH DELICA LANCETS 33G misc, , Disp: , Rfl:     Oxymetazoline HCl (NASAL SPRAY NA), into the nostril(s) as directed by provider., Disp: , Rfl:     rOPINIRole (REQUIP) 0.25 MG tablet, Take 1 tablet by mouth Every Night. Take 1 hour before bedtime., Disp: 90 tablet, Rfl: 1    sildenafil (Viagra) 100 MG tablet, Take 1 tablet by mouth Daily As Needed for Erectile Dysfunction., Disp: 30 tablet, Rfl: 1    Stool Softener 100 MG capsule, , Disp: , Rfl:     tadalafil (Cialis) 20 MG tablet, Take 1 tablet by mouth Daily As Needed for Erectile Dysfunction., Disp: 15 tablet, Rfl: 5    Testosterone Cypionate (DEPOTESTOTERONE CYPIONATE) 200 MG/ML injection, 1 ml every 10 days , im, Disp: 10 mL, Rfl: 1  No current facility-administered medications for this visit.    Allergies: No Known Allergies    I reviewed the patient's chief complaint, history of present illness, review of systems, past medical history, surgical history, family history, social history, medications and allergy list.     Objective    Vital Signs:   Vitals:    09/10/24 1407   BP: 150/86   Weight: 91.2 kg (201 lb 1 oz)   Height: 175.3 cm (69.02\")     Body mass index is 29.68 kg/m².   BMI is >= 25 and <30. (Overweight) The following options " were offered after discussion;: exercise counseling/recommendations and nutrition counseling/recommendations      In this visit the patient was advised to stop smoking and was offered tobacco cessation measures and resources, including NRT and/or medication intervention. At least 5 minutes was spent on face-to-face counseling regarding smoking cessation.     Ortho Exam:  Constitutional: General Appearance: healthy-appearing, NAD, and normal body habitus.   Psychiatric: Mood and Affect: normal mood and affect and active and alert.   Cardiovascular System: Arterial Pulses Right: radial normal. Arterial Pulses Left: radial normal.   C-Spine/Neck: Active Range of Motion: no crepitus or pain elicited on motion and flexion normal, extension normal, rotation normal, and lateral flexion normal.   Shoulders: Inspection Right: no misalignment, erythema, induration, swelling, or warmth and AC prominence normal. Inspection Left: no misalignment, erythema, induration, swelling, or warmth and AC prominence normal. Bony Palpation Right: tenderness of the acromioclavicular joint, the greater tuberosity, and the bicipital groove and no tenderness of the clavicle. Soft Tissue Palpation Right: tenderness of the supraspinatus, the infraspinatus, the glenohumeral joint region, and the lateral cuff insertion. Active Range of Motion Right: limited. Special Tests Right: Hawkin's test positive and empty can sign positive.   exam RIGHT shoulder: forward flexion approximately 100 degrees. Abduction 100 degrees. Internal rotation SI. Motor testing supraspinatus 4/5  exam LEFT shoulder: forward flexion approximately 140 degrees. Abduction 140 degrees. Internal rotation L1. Motor testing supraspinatus 5/5    Results Review:   Imaging Results (Last 24 Hours)       Procedure Component Value Units Date/Time    XR Shoulder 2+ View Right [[id number]] Resulted: 09/10/24 1350     Updated: 09/10/24 1356        I personally reviewed and interpreted  radiographs of the right shoulder from 9/10/2024.  No acute fracture or dislocation.  The humeral head is elevated in relation to the glenoid with narrowing of the subacromial space.  This is concerning for chronic rotator cuff tearing.  My degenerative changes are also noted in the glenohumeral joint.    Procedures    Assessment / Plan    Assessment/Plan:   Diagnoses and all orders for this visit:    1. Right shoulder pain, unspecified chronicity (Primary)  -     XR Shoulder 2+ View Right    2. Nontraumatic complete tear of right rotator cuff  -     MRI Shoulder Right Without Contrast; Future    3. Arthritis of right glenohumeral joint    Right shoulder pain ongoing for the last couple of years.  Denies acute mechanism of injury or any prior trauma.  Has worked as a  and above his head for much of his adult life.  Radiographs of the right shoulder show an elevated humeral head in relation to the glenoid with narrowing of the subacromial space.  This is concerning for chronic rotator cuff tearing.  We reviewed his radiographic findings and detail.  He has reduced range of motion as result of his current injury.  I recommended that we MRI the right shoulder to further evaluate the structural integrity of the rotator cuff.  This will help guide us from a surgical versus nonsurgical standpoint.  He will follow-up after the MRI to discuss results.  He is also having ongoing pain from the right hip.  Next visit, we will also inject the ischial bursa with corticosteroid.  Continue with hydrocodone and ibuprofen in the meantime.    Previous imaging studies reviewed: 9/10/2024-radiographs of the right shoulder.    Previous laboratory results reviewed: 7/23/2024-hemoglobin A1c 6.4%.    Previous documentation reviewed: 7/23/2024-office visit-Chavo Duenas MD.    Follow Up:   No follow-ups on file.      Harshil Ag MD  [facility]

## 2024-09-24 ENCOUNTER — OFFICE VISIT (OUTPATIENT)
Dept: FAMILY MEDICINE CLINIC | Facility: CLINIC | Age: 59
End: 2024-09-24
Payer: COMMERCIAL

## 2024-09-24 VITALS
WEIGHT: 201 LBS | BODY MASS INDEX: 29.77 KG/M2 | OXYGEN SATURATION: 97 % | SYSTOLIC BLOOD PRESSURE: 160 MMHG | DIASTOLIC BLOOD PRESSURE: 80 MMHG | HEART RATE: 71 BPM | HEIGHT: 69 IN

## 2024-09-24 DIAGNOSIS — G89.29 CHRONIC RIGHT SHOULDER PAIN: ICD-10-CM

## 2024-09-24 DIAGNOSIS — M19.011 ARTHRITIS OF RIGHT GLENOHUMERAL JOINT: ICD-10-CM

## 2024-09-24 DIAGNOSIS — M25.511 RIGHT SHOULDER PAIN, UNSPECIFIED CHRONICITY: Primary | ICD-10-CM

## 2024-09-24 DIAGNOSIS — M75.121 NONTRAUMATIC COMPLETE TEAR OF RIGHT ROTATOR CUFF: ICD-10-CM

## 2024-09-24 DIAGNOSIS — M25.511 CHRONIC RIGHT SHOULDER PAIN: ICD-10-CM

## 2024-09-24 DIAGNOSIS — M25.559 HIP PAIN, UNSPECIFIED LATERALITY: ICD-10-CM

## 2024-09-24 DIAGNOSIS — G89.29 CHRONIC PAIN OF RIGHT KNEE: ICD-10-CM

## 2024-09-24 DIAGNOSIS — I10 PRIMARY HYPERTENSION: Primary | ICD-10-CM

## 2024-09-24 DIAGNOSIS — M25.561 CHRONIC PAIN OF RIGHT KNEE: ICD-10-CM

## 2024-09-24 PROCEDURE — 3044F HG A1C LEVEL LT 7.0%: CPT | Performed by: FAMILY MEDICINE

## 2024-09-24 PROCEDURE — 3079F DIAST BP 80-89 MM HG: CPT | Performed by: FAMILY MEDICINE

## 2024-09-24 PROCEDURE — 3077F SYST BP >= 140 MM HG: CPT | Performed by: FAMILY MEDICINE

## 2024-09-24 PROCEDURE — 99214 OFFICE O/P EST MOD 30 MIN: CPT | Performed by: FAMILY MEDICINE

## 2024-09-24 RX ORDER — ALBUTEROL SULFATE 90 UG/1
2 INHALANT RESPIRATORY (INHALATION) EVERY 4 HOURS PRN
Qty: 18 G | Refills: 2 | Status: SHIPPED | OUTPATIENT
Start: 2024-09-24

## 2024-09-24 RX ORDER — ROPINIROLE 1 MG/1
1 TABLET, FILM COATED ORAL NIGHTLY
Qty: 90 TABLET | Refills: 1 | Status: SHIPPED | OUTPATIENT
Start: 2024-09-24

## 2024-09-24 RX ORDER — HYDROCODONE BITARTRATE AND ACETAMINOPHEN 7.5; 325 MG/1; MG/1
1 TABLET ORAL EVERY 6 HOURS PRN
Qty: 45 TABLET | Refills: 0 | Status: SHIPPED | OUTPATIENT
Start: 2024-09-24

## 2024-09-24 RX ORDER — BENAZEPRIL HYDROCHLORIDE 20 MG/1
20 TABLET ORAL DAILY
Qty: 30 TABLET | Refills: 2 | Status: SHIPPED | OUTPATIENT
Start: 2024-09-24

## 2024-09-25 LAB
ALBUMIN SERPL-MCNC: 4.6 G/DL (ref 3.8–4.9)
ALP SERPL-CCNC: 93 IU/L (ref 44–121)
ALT SERPL-CCNC: 17 IU/L (ref 0–44)
AST SERPL-CCNC: 16 IU/L (ref 0–40)
BASOPHILS # BLD AUTO: 0.1 X10E3/UL (ref 0–0.2)
BASOPHILS NFR BLD AUTO: 1 %
BILIRUB SERPL-MCNC: 0.5 MG/DL (ref 0–1.2)
BUN SERPL-MCNC: 27 MG/DL (ref 8–27)
BUN/CREAT SERPL: 16 (ref 10–24)
CALCIUM SERPL-MCNC: 9.4 MG/DL (ref 8.6–10.2)
CHLORIDE SERPL-SCNC: 103 MMOL/L (ref 96–106)
CHOLEST SERPL-MCNC: 186 MG/DL (ref 100–199)
CO2 SERPL-SCNC: 21 MMOL/L (ref 20–29)
CREAT SERPL-MCNC: 1.7 MG/DL (ref 0.76–1.27)
EGFRCR SERPLBLD CKD-EPI 2021: 46 ML/MIN/1.73
EOSINOPHIL # BLD AUTO: 0.1 X10E3/UL (ref 0–0.4)
EOSINOPHIL NFR BLD AUTO: 1 %
ERYTHROCYTE [DISTWIDTH] IN BLOOD BY AUTOMATED COUNT: 14.8 % (ref 11.6–15.4)
GLOBULIN SER CALC-MCNC: 2.5 G/DL (ref 1.5–4.5)
GLUCOSE SERPL-MCNC: 157 MG/DL (ref 70–99)
HCT VFR BLD AUTO: 54.9 % (ref 37.5–51)
HDLC SERPL-MCNC: 48 MG/DL
HGB BLD-MCNC: 17.8 G/DL (ref 13–17.7)
IMM GRANULOCYTES # BLD AUTO: 0 X10E3/UL (ref 0–0.1)
IMM GRANULOCYTES NFR BLD AUTO: 0 %
LDLC SERPL CALC-MCNC: 118 MG/DL (ref 0–99)
LYMPHOCYTES # BLD AUTO: 1.4 X10E3/UL (ref 0.7–3.1)
LYMPHOCYTES NFR BLD AUTO: 13 %
MCH RBC QN AUTO: 28.8 PG (ref 26.6–33)
MCHC RBC AUTO-ENTMCNC: 32.4 G/DL (ref 31.5–35.7)
MCV RBC AUTO: 89 FL (ref 79–97)
MONOCYTES # BLD AUTO: 0.8 X10E3/UL (ref 0.1–0.9)
MONOCYTES NFR BLD AUTO: 7 %
NEUTROPHILS # BLD AUTO: 8 X10E3/UL (ref 1.4–7)
NEUTROPHILS NFR BLD AUTO: 78 %
PLATELET # BLD AUTO: 220 X10E3/UL (ref 150–450)
POTASSIUM SERPL-SCNC: 4.3 MMOL/L (ref 3.5–5.2)
PROT SERPL-MCNC: 7.1 G/DL (ref 6–8.5)
RBC # BLD AUTO: 6.18 X10E6/UL (ref 4.14–5.8)
SODIUM SERPL-SCNC: 140 MMOL/L (ref 134–144)
TRIGL SERPL-MCNC: 111 MG/DL (ref 0–149)
TSH SERPL DL<=0.005 MIU/L-ACNC: 0.68 UIU/ML (ref 0.45–4.5)
VLDLC SERPL CALC-MCNC: 20 MG/DL (ref 5–40)
WBC # BLD AUTO: 10.3 X10E3/UL (ref 3.4–10.8)

## 2024-09-30 ENCOUNTER — TELEPHONE (OUTPATIENT)
Dept: ORTHOPEDIC SURGERY | Facility: CLINIC | Age: 59
End: 2024-09-30

## 2024-09-30 NOTE — TELEPHONE ENCOUNTER
"    Caller: Ibrahima Martinez    Relationship to patient: Self    Best call back number: [phone] (home)       Patient is needing: PATIENT IS BEING SEEN FOR RIGHT SHOULDER - PATIENT WAS ORDERED TO PHYSICAL THERAPY AS A PREREQUISITE TO GETTING AN MRI.  PATIENET STATES  LOCATION HE WAS SENT TO  IS FAR FROM PATIENT. THE ONE \"NEAR HIM\" DOESNT TAKE HIS INSURANCE. DO YOU KNOW OF ANY OTHERS?    PATIENT INQUIRED ARE THERE ANY EXERCISES HE CAN DO AT HOME?  PLEASE ADVISE        "

## 2024-10-03 ENCOUNTER — TELEPHONE (OUTPATIENT)
Dept: FAMILY MEDICINE CLINIC | Facility: CLINIC | Age: 59
End: 2024-10-03
Payer: COMMERCIAL

## 2024-10-03 NOTE — TELEPHONE ENCOUNTER
Caller: Cayla Martinze    Relationship: Self    Best call back number: [phone]     What is the best time to reach you: ANYTIME    Who are you requesting to speak with (clinical staff, provider,  specific staff member): CLINICAL STAFF    Do you know the name of the person who called: CAYLA    What was the call regarding: PATIENT CALLED TO ASK IF THE RESULTS FOR HIS RECENT LABS WERE COMPLETED    PLEASE ADVISE

## 2024-10-05 NOTE — TELEPHONE ENCOUNTER
There has been some overall decrease in kidney function.  He needs to really make sure that he is staying off of all anti-inflammatory medicines like Aleve or ibuprofen.  I also want him to make sure he is drinking plenty of fluids.  No significant changes otherwise in his labs.

## 2024-10-09 ENCOUNTER — OFFICE VISIT (OUTPATIENT)
Age: 59
End: 2024-10-09
Payer: COMMERCIAL

## 2024-10-09 VITALS
HEIGHT: 69 IN | WEIGHT: 201.06 LBS | SYSTOLIC BLOOD PRESSURE: 150 MMHG | BODY MASS INDEX: 29.78 KG/M2 | DIASTOLIC BLOOD PRESSURE: 78 MMHG

## 2024-10-09 DIAGNOSIS — M70.71 ISCHIAL BURSITIS OF RIGHT SIDE: ICD-10-CM

## 2024-10-09 DIAGNOSIS — M76.01 GLUTEAL TENDINITIS OF RIGHT BUTTOCK: Primary | ICD-10-CM

## 2024-10-09 DIAGNOSIS — M75.121 NONTRAUMATIC COMPLETE TEAR OF RIGHT ROTATOR CUFF: ICD-10-CM

## 2024-10-09 RX ORDER — LIDOCAINE HYDROCHLORIDE 10 MG/ML
2 INJECTION, SOLUTION EPIDURAL; INFILTRATION; INTRACAUDAL; PERINEURAL
Status: COMPLETED | OUTPATIENT
Start: 2024-10-09 | End: 2024-10-09

## 2024-10-09 RX ORDER — TRIAMCINOLONE ACETONIDE 40 MG/ML
80 INJECTION, SUSPENSION INTRA-ARTICULAR; INTRAMUSCULAR
Status: COMPLETED | OUTPATIENT
Start: 2024-10-09 | End: 2024-10-09

## 2024-10-09 RX ADMIN — LIDOCAINE HYDROCHLORIDE 2 ML: 10 INJECTION, SOLUTION EPIDURAL; INFILTRATION; INTRACAUDAL; PERINEURAL at 12:08

## 2024-10-09 RX ADMIN — TRIAMCINOLONE ACETONIDE 80 MG: 40 INJECTION, SUSPENSION INTRA-ARTICULAR; INTRAMUSCULAR at 12:08

## 2024-10-09 NOTE — PROGRESS NOTES
[facility] Office Follow Up Visit     Office Follow Up      Date: 10/09/2024   Patient Name: Ibrahima Martinez  MRN: [id number]  YOB: 1965    Referring Physician: Chavo Duenas MD     Chief Complaint:   Chief Complaint   Patient presents with    Follow-up     7 week follow up- Gluteal tendinitis of right buttock     History of Present Illness: Ibrahima Martinez is a 59 y.o. male who returns to clinic today for follow up on right hip. His pain is a 8 /10 on the pain scale. Patient has tried the following previous treatments injections: Cortisone/Visco. He mentions current symptoms of same as prior . He states that these treatments have stayed the same.    Subjective     Review of Systems: Review of Systems   Constitutional:  Negative for chills, fever, unexpected weight gain and unexpected weight loss.   HENT:  Negative for congestion, postnasal drip and rhinorrhea.    Eyes:  Negative for blurred vision.   Respiratory:  Negative for shortness of breath.    Cardiovascular:  Negative for leg swelling.   Gastrointestinal:  Negative for abdominal pain, nausea and vomiting.   Genitourinary:  Negative for difficulty urinating.   Musculoskeletal:  Positive for arthralgias. Negative for gait problem, joint swelling and myalgias.   Skin:  Negative for skin lesions and wound.   Neurological:  Negative for dizziness, weakness, light-headedness and numbness.   Hematological:  Does not bruise/bleed easily.   Psychiatric/Behavioral:  Negative for depressed mood.         Medications:   Current Outpatient Medications:     Acetaminophen Extra Strength 500 MG tablet, , Disp: , Rfl:     albuterol sulfate  (90 Base) MCG/ACT inhaler, Inhale 2 puffs Every 4 (Four) Hours As Needed for Wheezing., Disp: 18 g, Rfl: 2    amLODIPine (NORVASC) 10 MG tablet, Take 1 tablet by mouth Daily., Disp: 30 tablet, Rfl: 5    aspirin 81 MG tablet, Take  by mouth Daily., Disp: , Rfl:      atorvastatin (LIPITOR) 80 MG tablet, Take 1 tablet by mouth Daily., Disp: 90 tablet, Rfl: 1    benazepril (LOTENSIN) 20 MG tablet, Take 1 tablet by mouth Daily., Disp: 30 tablet, Rfl: 2    clopidogrel (PLAVIX) 75 MG tablet, Take 1 tablet by mouth Daily., Disp: 90 tablet, Rfl: 1    gabapentin (NEURONTIN) 600 MG tablet, Take 1 tablet by mouth 4 (Four) Times a Day., Disp: 120 tablet, Rfl: 3    Glucose Blood (Blood Glucose Test Strips 333) strip, 1 strip by In Vitro route Daily., Disp: 100 strip, Rfl: 5    glucose monitor monitoring kit, Use 1 each Daily. Test fingerstick blood sugar daily prn diabetes, Disp: 1 each, Rfl: 0    HYDROcodone-acetaminophen (NORCO) 7.5-325 MG per tablet, Take 1 tablet by mouth Every 6 (Six) Hours As Needed for Moderate Pain., Disp: 45 tablet, Rfl: 0    ibuprofen (ADVIL,MOTRIN) 800 MG tablet, , Disp: , Rfl:     Lancets mis, Use 1 Lancet Daily., Disp: 100 each, Rfl: 5    metoprolol tartrate (LOPRESSOR) 50 MG tablet, TAKE 1 TABLET BY MOUTH 2 TIMES A DAY, Disp: 180 tablet, Rfl: 1    mometasone-formoterol (DULERA 100) 100-5 MCG/ACT inhaler, Inhale 2 puffs 2 (Two) Times a Day., Disp: 1 each, Rfl: 5    nitroglycerin (NITROSTAT) 0.4 MG SL tablet, , Disp: , Rfl:     ONETOUCH DELICA LANCETS 33G misc, , Disp: , Rfl:     Oxymetazoline HCl (NASAL SPRAY NA), into the nostril(s) as directed by provider., Disp: , Rfl:     rOPINIRole (REQUIP) 1 MG tablet, Take 1 tablet by mouth Every Night. Take 1 hour before bedtime., Disp: 90 tablet, Rfl: 1    sildenafil (Viagra) 100 MG tablet, Take 1 tablet by mouth Daily As Needed for Erectile Dysfunction., Disp: 30 tablet, Rfl: 1    Stool Softener 100 MG capsule, , Disp: , Rfl:     tadalafil (Cialis) 20 MG tablet, Take 1 tablet by mouth Daily As Needed for Erectile Dysfunction., Disp: 15 tablet, Rfl: 5    Testosterone Cypionate (DEPOTESTOTERONE CYPIONATE) 200 MG/ML injection, 1 ml every 10 days , im, Disp: 10 mL, Rfl: 1    Allergies: No Known Allergies    I have  "reviewed and updated the patient's chief complaint, history of present illness, review of systems, past medical history, surgical history, family history, social history, medications and allergy list as appropriate.     Objective      Vital Signs:   Vitals:    10/09/24 1136   BP: 150/78   Weight: 91.2 kg (201 lb 1 oz)   Height: 175.3 cm (69.02\")     Body mass index is 29.68 kg/m².  BMI is >= 25 and <30. (Overweight) The following options were offered after discussion;: exercise counseling/recommendations and nutrition counseling/recommendations      In this visit the patient was advised to stop smoking and was offered tobacco cessation measures and resources, including NRT and/or medication intervention. At least 5 minutes was spent on face-to-face counseling regarding smoking cessation.     Ortho Exam:  Gait and Station: Appearance: ambulating with no assistive devices and limp.   Hip/Pelvis Appearance: Inspection: normal axial alignment and pelvis level.   Hips: Bony Palpation Left: tenderness of the ischial tuberosity. Soft Tissue Palpation Left: tenderness of the biceps femoris muscle, the semitendinous muscle, and the semimembranous muscle. Active Range of Motion Left: normal, flexion normal, extension normal, internal rotation normal, and external rotation normal. Passive Range of Motion Right: no flexion contracture, hamstring tightness popliteal angle, or pain with motion and normal, flexion normal, extension normal, internal rotation normal, and external rotation normal. Passive Range of Motion Left: no flexion contracture, hamstring tightness popliteal angle, or pain with motion and normal, flexion normal, extension normal, internal rotation normal, and external rotation normal. Strength Right: normal 5/5. Strength Left: normal 5/5.   Skin: Right Lower Extremity: normal. Left Lower Extremity: normal.   Right hip exam:  Normal hip contour without evidence of ecchymosis or swelling.  Range of motion of the " hip shows pain only is exacerbated with Mac test and Straight Leg Raise.  Negative log roll, FADIR.  Hip flexion 110°, internal rotation 10°, external rotation 60°.  Tenderness to palpation greatest at the greater trochanter region.  Mild tenderness along the iliotibial band.  Mild tenderness at the gluteal region.  Negative tenderness at the ischial tuberosity.  Negative SI joint tenderness to palpation.     Results Review:   Imaging Results (Last 24 Hours)       Procedure Component Value Units Date/Time    US Guided Injection [[id number]] Resulted: 10/09/24 1339     Updated: 10/09/24 1339    Narrative:      US guided right ischial bursa corticosteroid injection              Procedures    Assessment / Plan      Assessment/Plan:   Diagnoses and all orders for this visit:    1. Gluteal tendinitis of right buttock (Primary)  -     Cancel: US Guided Injection  -     US Guided Injection    2. Nontraumatic complete tear of right rotator cuff    3. Ischial bursitis of right side    Other orders  -     - Large Joint Arthrocentesis: R ischiogluteal bursa    Follow-up on right hip pain.  He has had numerous injections in the past but not quite resolved the symptoms specially the posterior hip.  Today he is most tender over the ischial bursa and we will inject cortisone there under ultrasound guidance.  He is also previously been seen for his right shoulder and there was concern for rotator cuff tear.   insurance denied MRI of the shoulder.  I will give him home exercises to be done under my supervision for the next 6 weeks.  I will see him back in 6 weeks to discuss his response.  May then need to order the MRI again.    Follow Up:   Return in about 6 weeks (around 11/20/2024) for Recheck.      Harshil Ag MD  [facility]

## 2024-10-09 NOTE — PROGRESS NOTES
Procedure   - Large Joint Arthrocentesis: R ischiogluteal bursa on 10/9/2024 12:08 PM  Indications: pain  Details: 22 G needle, ultrasound-guided  Medications: 2 mL lidocaine PF 1% 1 %; 80 mg triamcinolone acetonide 40 MG/ML  Outcome: tolerated well, no immediate complications  Procedure, treatment alternatives, risks and benefits explained, specific risks discussed. Consent was given by the patient. Immediately prior to procedure a time out was called to verify the correct patient, procedure, equipment, support staff and site/side marked as required. Patient was prepped and draped in the usual sterile fashion.

## 2024-10-23 ENCOUNTER — OFFICE VISIT (OUTPATIENT)
Dept: FAMILY MEDICINE CLINIC | Facility: CLINIC | Age: 59
End: 2024-10-23
Payer: COMMERCIAL

## 2024-10-23 VITALS
BODY MASS INDEX: 29.62 KG/M2 | OXYGEN SATURATION: 96 % | DIASTOLIC BLOOD PRESSURE: 80 MMHG | SYSTOLIC BLOOD PRESSURE: 138 MMHG | HEIGHT: 69 IN | WEIGHT: 200 LBS | HEART RATE: 64 BPM

## 2024-10-23 DIAGNOSIS — G89.29 CHRONIC PAIN OF RIGHT KNEE: ICD-10-CM

## 2024-10-23 DIAGNOSIS — M25.561 CHRONIC PAIN OF RIGHT KNEE: ICD-10-CM

## 2024-10-23 DIAGNOSIS — M25.561 CHRONIC PAIN OF BOTH KNEES: ICD-10-CM

## 2024-10-23 DIAGNOSIS — G89.29 CHRONIC PAIN OF BOTH KNEES: ICD-10-CM

## 2024-10-23 DIAGNOSIS — M25.562 CHRONIC PAIN OF BOTH KNEES: ICD-10-CM

## 2024-10-23 DIAGNOSIS — E11.65 TYPE 2 DIABETES MELLITUS WITH HYPERGLYCEMIA, WITHOUT LONG-TERM CURRENT USE OF INSULIN: ICD-10-CM

## 2024-10-23 DIAGNOSIS — M54.16 LUMBAR RADICULOPATHY: ICD-10-CM

## 2024-10-23 DIAGNOSIS — N18.32 STAGE 3B CHRONIC KIDNEY DISEASE: Primary | ICD-10-CM

## 2024-10-23 PROCEDURE — 3044F HG A1C LEVEL LT 7.0%: CPT | Performed by: FAMILY MEDICINE

## 2024-10-23 PROCEDURE — 99214 OFFICE O/P EST MOD 30 MIN: CPT | Performed by: FAMILY MEDICINE

## 2024-10-23 PROCEDURE — 3075F SYST BP GE 130 - 139MM HG: CPT | Performed by: FAMILY MEDICINE

## 2024-10-23 PROCEDURE — 3079F DIAST BP 80-89 MM HG: CPT | Performed by: FAMILY MEDICINE

## 2024-10-23 RX ORDER — GABAPENTIN 600 MG/1
600 TABLET ORAL 4 TIMES DAILY
Qty: 120 TABLET | Refills: 3 | Status: SHIPPED | OUTPATIENT
Start: 2024-10-23

## 2024-10-23 RX ORDER — HYDROCODONE BITARTRATE AND ACETAMINOPHEN 7.5; 325 MG/1; MG/1
1 TABLET ORAL EVERY 6 HOURS PRN
Qty: 60 TABLET | Refills: 0 | Status: SHIPPED | OUTPATIENT
Start: 2024-10-23

## 2024-10-24 NOTE — PROGRESS NOTES
Follow Up Office Visit      Date of Visit:  10/23/2024   Patient Name: Irbahima Martinez  : 1965   MRN: [id number]     Chief Complaint:    Chief Complaint   Patient presents with    Hypertension    Osteoarthritis       History of Present Illness: Ibrahima Martinez is a 59 y.o. male who is here today for follow up.    History of Present Illness  The patient is a 59-year-old gentleman here following up on his hypertension and shoulder discomfort.    He has a history of heart disease and suffered a heart attack in . He is currently on benazepril for blood pressure management, which has been effective. His blood pressure reading today is 130/80.    He has been engaging in physical therapy exercises for his shoulder discomfort, which have provided some relief. However, he reports that a recent steroid injection did not alleviate his symptoms. He experiences throbbing pain in his shoulder, which disrupts his sleep. To manage the pain, he takes Lortab twice daily, but this often leaves him short of medication at the end of the month. He also reports a popping sensation in his knee, which is not painful. He is currently on gabapentin, taking 600 mg four times daily.          Subjective      Review of Systems:   Review of Systems   Constitutional:  Negative for fatigue and fever.   HENT:  Negative for congestion and ear pain.    Respiratory:  Negative for apnea, cough, chest tightness and shortness of breath.    Cardiovascular:  Negative for chest pain.   Gastrointestinal:  Negative for abdominal pain, constipation, diarrhea and nausea.   Musculoskeletal:  Positive for arthralgias and back pain.   Psychiatric/Behavioral:  Negative for depressed mood and stress.        Past Medical History:   Past Medical History:   Diagnosis Date    Back problem     Diabetes     Heart disease     Hypertension        Past Surgical History:   Past Surgical History:   Procedure Laterality Date    CARDIAC CATHETERIZATION       CORONARY ARTERY BYPASS GRAFT  03/28/2022    Lima to LAD, reverse saphenous vein graft to 1st diagonal, reverse saphenous vein graft to ramus intermeduiusand reverse saphenous vein graft sequential to PDA then to first right posterior lateral branch artery, then to the second posterolateral artery (Dr Yogi Mcgregor    CORONARY STENT PLACEMENT      EPIDURAL BLOCK  2021    For right-sided sciatica pain.        Family History:   Family History   Problem Relation Age of Onset    Heart attack Father     Hypertension Father     Heart disease Father     Arthritis Mother        Social History:   Social History     Socioeconomic History    Marital status:    Tobacco Use    Smoking status: Some Days     Current packs/day: 1.00     Average packs/day: 1 pack/day for 15.0 years (15.0 ttl pk-yrs)     Types: Cigarettes    Smokeless tobacco: Never   Vaping Use    Vaping status: Never Used   Substance and Sexual Activity    Alcohol use: No    Drug use: Never    Sexual activity: Defer       Medications:     Current Outpatient Medications:     gabapentin (NEURONTIN) 600 MG tablet, Take 1 tablet by mouth 4 (Four) Times a Day., Disp: 120 tablet, Rfl: 3    HYDROcodone-acetaminophen (NORCO) 7.5-325 MG per tablet, Take 1 tablet by mouth Every 6 (Six) Hours As Needed for Moderate Pain., Disp: 60 tablet, Rfl: 0    Acetaminophen Extra Strength 500 MG tablet, , Disp: , Rfl:     albuterol sulfate  (90 Base) MCG/ACT inhaler, Inhale 2 puffs Every 4 (Four) Hours As Needed for Wheezing., Disp: 18 g, Rfl: 2    amLODIPine (NORVASC) 10 MG tablet, Take 1 tablet by mouth Daily., Disp: 30 tablet, Rfl: 5    aspirin 81 MG tablet, Take  by mouth Daily., Disp: , Rfl:     atorvastatin (LIPITOR) 80 MG tablet, Take 1 tablet by mouth Daily., Disp: 90 tablet, Rfl: 1    benazepril (LOTENSIN) 20 MG tablet, Take 1 tablet by mouth Daily., Disp: 30 tablet, Rfl: 2    clopidogrel (PLAVIX) 75 MG tablet, Take 1 tablet by mouth Daily., Disp: 90 tablet, Rfl: 1    " Glucose Blood (Blood Glucose Test Strips 333) strip, 1 strip by In Vitro route Daily., Disp: 100 strip, Rfl: 5    glucose monitor monitoring kit, Use 1 each Daily. Test fingerstick blood sugar daily prn diabetes, Disp: 1 each, Rfl: 0    ibuprofen (ADVIL,MOTRIN) 800 MG tablet, , Disp: , Rfl:     Lancets misc, Use 1 Lancet Daily., Disp: 100 each, Rfl: 5    metoprolol tartrate (LOPRESSOR) 50 MG tablet, TAKE 1 TABLET BY MOUTH 2 TIMES A DAY, Disp: 180 tablet, Rfl: 1    mometasone-formoterol (DULERA 100) 100-5 MCG/ACT inhaler, Inhale 2 puffs 2 (Two) Times a Day., Disp: 1 each, Rfl: 5    nitroglycerin (NITROSTAT) 0.4 MG SL tablet, , Disp: , Rfl:     ONETOUCH DELICA LANCETS 33G misc, , Disp: , Rfl:     Oxymetazoline HCl (NASAL SPRAY NA), into the nostril(s) as directed by provider., Disp: , Rfl:     rOPINIRole (REQUIP) 1 MG tablet, Take 1 tablet by mouth Every Night. Take 1 hour before bedtime., Disp: 90 tablet, Rfl: 1    sildenafil (Viagra) 100 MG tablet, Take 1 tablet by mouth Daily As Needed for Erectile Dysfunction., Disp: 30 tablet, Rfl: 1    Stool Softener 100 MG capsule, , Disp: , Rfl:     tadalafil (Cialis) 20 MG tablet, Take 1 tablet by mouth Daily As Needed for Erectile Dysfunction., Disp: 15 tablet, Rfl: 5    Testosterone Cypionate (DEPOTESTOTERONE CYPIONATE) 200 MG/ML injection, 1 ml every 10 days , im, Disp: 10 mL, Rfl: 1    Allergies:   No Known Allergies    Objective     Physical Exam:  Vital Signs:   Vitals:    10/23/24 1432   BP: 138/80   Pulse: 64   SpO2: 96%   Weight: 90.7 kg (200 lb)   Height: 175.3 cm (69\")     Body mass index is 29.53 kg/m².     Physical Exam  Vitals and nursing note reviewed.   Constitutional:       General: He is not in acute distress.     Appearance: Normal appearance. He is not ill-appearing.   HENT:      Head: Normocephalic and atraumatic.      Right Ear: Tympanic membrane and ear canal normal.      Left Ear: Tympanic membrane and ear canal normal.      Nose: Nose normal. "   Cardiovascular:      Rate and Rhythm: Normal rate and regular rhythm.      Heart sounds: Normal heart sounds.   Pulmonary:      Effort: Pulmonary effort is normal.      Breath sounds: Normal breath sounds.   Neurological:      Mental Status: He is alert and oriented to person, place, and time. Mental status is at baseline.   Psychiatric:         Mood and Affect: Mood normal.       Physical Exam      Procedures    Results  Laboratory Studies  Kidney function test: 1.7. A1c: 6.4.  Assessment / Plan      Assessment/Plan:   Diagnoses and all orders for this visit:    1. Type 2 diabetes mellitus with hyperglycemia, without long-term current use of insulin  -     gabapentin (NEURONTIN) 600 MG tablet; Take 1 tablet by mouth 4 (Four) Times a Day.  Dispense: 120 tablet; Refill: 3    2. Chronic pain of both knees  -     gabapentin (NEURONTIN) 600 MG tablet; Take 1 tablet by mouth 4 (Four) Times a Day.  Dispense: 120 tablet; Refill: 3    3. Lumbar radiculopathy  -     gabapentin (NEURONTIN) 600 MG tablet; Take 1 tablet by mouth 4 (Four) Times a Day.  Dispense: 120 tablet; Refill: 3    4. Chronic pain of right knee  -     HYDROcodone-acetaminophen (NORCO) 7.5-325 MG per tablet; Take 1 tablet by mouth Every 6 (Six) Hours As Needed for Moderate Pain.  Dispense: 60 tablet; Refill: 0       Assessment & Plan  1. Shoulder Pain.  The patient reports persistent shoulder pain despite receiving steroid injections, which only provide relief for about a week. He has been given home exercises to perform due to insurance denial for an MRI. He is currently taking hydrocodone for pain relief but often runs short at the end of the month. A refill for hydrocodone has been provided with an increased quantity to 60 pills per month to manage his pain better. Gabapentin, which he takes four times a day, was also refilled. If the shoulder pain persists, an MRI may be reconsidered.    2. Hypertension.  The patient's blood pressure has improved to  130/80 mmHg on benazepril. He was advised to continue his current dosage of benazepril as it is also protective of his kidneys.    3. Chronic Kidney Disease.  The patient's kidney function has been fluctuating, with a recent creatinine level of 1.7. He was advised to avoid NSAIDs and increase his water intake to help preserve kidney function. He was also reassured that his current medications, including benazepril, are safe for his kidneys.    4. History of Kidney Stones.  The patient reported passing multiple kidney stones in the past, which were soft and did not cause significant pain. He was advised to continue increasing his water intake to help prevent future stones.            Follow Up:   No follow-ups on file.    Chavo Duenas  [facility] [place] [facility] [place]     Patient or patient representative verbalized consent for the use of Ambient Listening during the visit with  Chavo Duenas MD for chart documentation. 10/24/2024  12:52 EDT

## 2024-10-30 DIAGNOSIS — I25.10 ATHEROSCLEROSIS OF CORONARY ARTERY OF NATIVE HEART WITHOUT ANGINA PECTORIS, UNSPECIFIED VESSEL OR LESION TYPE: ICD-10-CM

## 2024-10-30 DIAGNOSIS — I10 PRIMARY HYPERTENSION: ICD-10-CM

## 2024-10-31 RX ORDER — METOPROLOL TARTRATE 50 MG
50 TABLET ORAL 2 TIMES DAILY
Qty: 180 TABLET | Refills: 0 | Status: SHIPPED | OUTPATIENT
Start: 2024-10-31

## 2024-11-05 ENCOUNTER — TELEPHONE (OUTPATIENT)
Age: 59
End: 2024-11-05
Payer: COMMERCIAL

## 2024-11-12 ENCOUNTER — OFFICE VISIT (OUTPATIENT)
Dept: ORTHOPEDIC SURGERY | Facility: CLINIC | Age: 59
End: 2024-11-12
Payer: COMMERCIAL

## 2024-11-12 VITALS
HEIGHT: 69 IN | DIASTOLIC BLOOD PRESSURE: 74 MMHG | BODY MASS INDEX: 29.62 KG/M2 | SYSTOLIC BLOOD PRESSURE: 146 MMHG | WEIGHT: 200 LBS

## 2024-11-12 DIAGNOSIS — M19.011 ARTHRITIS OF RIGHT GLENOHUMERAL JOINT: ICD-10-CM

## 2024-11-12 DIAGNOSIS — M75.121 NONTRAUMATIC COMPLETE TEAR OF RIGHT ROTATOR CUFF: Primary | ICD-10-CM

## 2024-11-12 NOTE — PROGRESS NOTES
[facility] [place] [facility] Office Follow Up Visit     Office Follow Up      Date: 11/12/2024   Patient Name: Ibrahima Martinez  MRN: [id number]  YOB: 1965    Referring Physician: No ref. provider found     Chief Complaint:   Chief Complaint   Patient presents with    Follow-up     6 week follow up -- Nontraumatic complete tear of right rotator cuff     History of Present Illness: Ibrahima Martinez is a 59 y.o. male who returns to clinic today for follow up on right shoulder pain. His pain is a 6 /10 on the pain scale. Patient has tried the following previous treatments cane and physical therapy . He mentions current symptoms of pain . He states that these treatments have stayed the same.    Subjective     Review of Systems: Review of Systems   Constitutional:  Negative for chills, fever, unexpected weight gain and unexpected weight loss.   HENT:  Negative for congestion, postnasal drip and rhinorrhea.    Eyes:  Negative for blurred vision.   Respiratory:  Negative for shortness of breath.    Cardiovascular:  Negative for leg swelling.   Gastrointestinal:  Negative for abdominal pain, nausea and vomiting.   Genitourinary:  Negative for difficulty urinating.   Musculoskeletal:  Positive for arthralgias. Negative for gait problem, joint swelling and myalgias.   Skin:  Negative for skin lesions and wound.   Neurological:  Negative for dizziness, weakness, light-headedness and numbness.   Hematological:  Does not bruise/bleed easily.   Psychiatric/Behavioral:  Negative for depressed mood.         Medications:   Current Outpatient Medications:     Acetaminophen Extra Strength 500 MG tablet, , Disp: , Rfl:     albuterol sulfate  (90 Base) MCG/ACT inhaler, Inhale 2 puffs Every 4 (Four) Hours As Needed for Wheezing., Disp: 18 g, Rfl: 2    amLODIPine (NORVASC) 10 MG tablet, Take 1 tablet by mouth Daily., Disp: 30 tablet, Rfl: 5    aspirin 81 MG tablet, Take  by mouth  Daily., Disp: , Rfl:     atorvastatin (LIPITOR) 80 MG tablet, Take 1 tablet by mouth Daily., Disp: 90 tablet, Rfl: 1    benazepril (LOTENSIN) 20 MG tablet, Take 1 tablet by mouth Daily., Disp: 30 tablet, Rfl: 2    clopidogrel (PLAVIX) 75 MG tablet, Take 1 tablet by mouth Daily., Disp: 90 tablet, Rfl: 1    gabapentin (NEURONTIN) 600 MG tablet, Take 1 tablet by mouth 4 (Four) Times a Day., Disp: 120 tablet, Rfl: 3    Glucose Blood (Blood Glucose Test Strips 333) strip, 1 strip by In Vitro route Daily., Disp: 100 strip, Rfl: 5    glucose monitor monitoring kit, Use 1 each Daily. Test fingerstick blood sugar daily prn diabetes, Disp: 1 each, Rfl: 0    HYDROcodone-acetaminophen (NORCO) 7.5-325 MG per tablet, Take 1 tablet by mouth Every 6 (Six) Hours As Needed for Moderate Pain., Disp: 60 tablet, Rfl: 0    ibuprofen (ADVIL,MOTRIN) 800 MG tablet, , Disp: , Rfl:     Lancets [facility], Use 1 Lancet Daily., Disp: 100 each, Rfl: 5    metoprolol tartrate (LOPRESSOR) 50 MG tablet, TAKE 1 TABLET BY MOUTH 2 TIMES A DAY, Disp: 180 tablet, Rfl: 0    mometasone-formoterol (DULERA 100) 100-5 MCG/ACT inhaler, Inhale 2 puffs 2 (Two) Times a Day., Disp: 1 each, Rfl: 5    nitroglycerin (NITROSTAT) 0.4 MG SL tablet, , Disp: , Rfl:     ONETOUCH DELICA LANCETS 33G misc, , Disp: , Rfl:     Oxymetazoline HCl (NASAL SPRAY NA), into the nostril(s) as directed by provider., Disp: , Rfl:     rOPINIRole (REQUIP) 1 MG tablet, Take 1 tablet by mouth Every Night. Take 1 hour before bedtime., Disp: 90 tablet, Rfl: 1    sildenafil (Viagra) 100 MG tablet, Take 1 tablet by mouth Daily As Needed for Erectile Dysfunction., Disp: 30 tablet, Rfl: 1    Stool Softener 100 MG capsule, , Disp: , Rfl:     tadalafil (Cialis) 20 MG tablet, Take 1 tablet by mouth Daily As Needed for Erectile Dysfunction., Disp: 15 tablet, Rfl: 5    Testosterone Cypionate (DEPOTESTOTERONE CYPIONATE) 200 MG/ML injection, 1 ml every 10 days , im, Disp: 10 mL, Rfl: 1    Allergies: No Known  "Allergies    I have reviewed and updated the patient's chief complaint, history of present illness, review of systems, past medical history, surgical history, family history, social history, medications and allergy list as appropriate.     Objective      Vital Signs:   Vitals:    11/12/24 1309   BP: 146/74   Weight: 90.7 kg (200 lb)   Height: 175.3 cm (69\")     Body mass index is 29.53 kg/m².  BMI is >= 25 and <30. (Overweight) The following options were offered after discussion;: exercise counseling/recommendations and nutrition counseling/recommendations      In this visit the patient was advised to stop smoking and was offered tobacco cessation measures and resources, including NRT and/or medication intervention. At least 5 minutes was spent on face-to-face counseling regarding smoking cessation.     Ortho Exam:  Cardiovascular System: Arterial Pulses Right: radial normal. Arterial Pulses Left: radial normal.   C-Spine/Neck: Active Range of Motion: no crepitus or pain elicited on motion and flexion normal, extension normal, rotation normal, and lateral flexion normal.   Shoulders: Inspection Right: no misalignment, erythema, induration, swelling, or warmth and AC prominence normal. Inspection Left: no misalignment, erythema, induration, swelling, or warmth and AC prominence normal. Bony Palpation Right: tenderness of the acromioclavicular joint, the greater tuberosity, and the bicipital groove and no tenderness of the clavicle. Soft Tissue Palpation Right: tenderness of the supraspinatus, the infraspinatus, the glenohumeral joint region, and the lateral cuff insertion. Active Range of Motion Right: limited. Special Tests Right: Hawkin's test positive and empty can sign positive.   exam RIGHT shoulder: forward flexion approximately 90 degrees. Abduction 90 degrees. Internal rotation SI. Motor testing supraspinatus 4/5  exam LEFT shoulder: forward flexion approximately 140 degrees. Abduction 140 degrees. Internal " rotation L1. Motor testing supraspinatus 5/5    Results Review:   Imaging Results (Last 24 Hours)       ** No results found for the last 24 hours. **            Procedures    Assessment / Plan      Assessment/Plan:   Diagnoses and all orders for this visit:    1. Nontraumatic complete tear of right rotator cuff (Primary)  -     MRI Shoulder Right Without Contrast; Future    2. Arthritis of right glenohumeral joint  -     MRI Shoulder Right Without Contrast; Future    Follow-up on right shoulder pain from glenohumeral joint arthritis and likely complete tear of the rotator cuff.  Symptoms unchanged despite home exercise program.  He has been started on hydrocodone by his primary care physician and this has improved symptoms to a mild degree.  Still has reduced range of motion and poor strength.  Previous radiographs did show narrowed subacromial space from chronic rotator cuff arthropathy.  Recommend that we obtain MRI now to evaluate further.  This will aid in surgical planning and help us make a more informed treatment plan.  I will see him back at the MRI to discuss results at next steps.    Follow Up:   No follow-ups on file.      Harshil Ag MD  [facility] [place] [facility]

## 2024-11-14 ENCOUNTER — TELEPHONE (OUTPATIENT)
Dept: ORTHOPEDIC SURGERY | Facility: CLINIC | Age: 59
End: 2024-11-14
Payer: COMMERCIAL

## 2024-11-14 NOTE — TELEPHONE ENCOUNTER
*HUB OK TO RELAY*  FAXED ORDER AND APPROVAL TO [facility], THIS IS THE CLOSEST PLACE TO THE PATIENT TO HAVE THIS MRI COMPLETED. THEY WILL CALL THE PATIENT TO SCHEDULE. ONCE THE MRI IS SCHEDULED, HUB OK TO SCHEDULE NEXT AVAIL MRI F/U FOR AT LEAST THREE DAYS AFTER THE MRI DATE. PLEASE REMIND PATIENT TO BRING THE MRI DISC TO THEIR APPT.

## 2024-11-18 DIAGNOSIS — G89.29 CHRONIC PAIN OF RIGHT KNEE: ICD-10-CM

## 2024-11-18 DIAGNOSIS — M25.561 CHRONIC PAIN OF RIGHT KNEE: ICD-10-CM

## 2024-11-18 NOTE — TELEPHONE ENCOUNTER
Caller: Juan Ibrahima Gino    Relationship: Self    Best call back number: [phone]    Requested Prescriptions:   Requested Prescriptions     Pending Prescriptions Disp Refills    HYDROcodone-acetaminophen (NORCO) 7.5-325 MG per tablet 60 tablet 0     Sig: Take 1 tablet by mouth Every 6 (Six) Hours As Needed for Moderate Pain.        Pharmacy where request should be sent: [facility] [address] [phone] [facility] [phone]      Last office visit with prescribing clinician: 10/23/2024   Last telemedicine visit with prescribing clinician: Visit date not found   Next office visit with prescribing clinician: 12/23/2024     Additional details provided by patient: PATIENT NEEDS A REFILL     Does the patient have less than a 3 day supply:  [] Yes  [x] No    Would you like a call back once the refill request has been completed: [] Yes [x] No    If the office needs to give you a call back, can they leave a voicemail: [] Yes [x] No    King Richter Rep   11/18/24 16:29 EST

## 2024-11-19 RX ORDER — HYDROCODONE BITARTRATE AND ACETAMINOPHEN 7.5; 325 MG/1; MG/1
1 TABLET ORAL EVERY 6 HOURS PRN
Qty: 60 TABLET | Refills: 0 | Status: SHIPPED | OUTPATIENT
Start: 2024-11-19

## 2024-11-20 DIAGNOSIS — E29.1 MALE HYPOGONADISM: ICD-10-CM

## 2024-11-21 RX ORDER — TESTOSTERONE CYPIONATE 200 MG/ML
INJECTION, SOLUTION INTRAMUSCULAR
Qty: 10 ML | Refills: 0 | Status: SHIPPED | OUTPATIENT
Start: 2024-11-21

## 2024-12-16 DIAGNOSIS — M25.561 CHRONIC PAIN OF RIGHT KNEE: ICD-10-CM

## 2024-12-16 DIAGNOSIS — G89.29 CHRONIC PAIN OF RIGHT KNEE: ICD-10-CM

## 2024-12-16 NOTE — TELEPHONE ENCOUNTER
Caller: Ibrahima Martinez Gino    Relationship: Self    Best call back number: [phone]     Requested Prescriptions:   Requested Prescriptions     Pending Prescriptions Disp Refills    rOPINIRole (REQUIP) 1 MG tablet 90 tablet 1     Sig: Take 1 tablet by mouth Every Night. Take 1 hour before bedtime.    HYDROcodone-acetaminophen (NORCO) 7.5-325 MG per tablet 60 tablet 0     Sig: Take 1 tablet by mouth Every 6 (Six) Hours As Needed for Moderate Pain.        Pharmacy where request should be sent: [facility] [address] [phone] [facility] [phone]      Last office visit with prescribing clinician: 10/23/2024   Last telemedicine visit with prescribing clinician: Visit date not found   Next office visit with prescribing clinician: 12/23/2024     Additional details provided by patient:     Does the patient have less than a 3 day supply:  [x] Yes  [] No    Would you like a call back once the refill request has been completed: [] Yes [x] No    If the office needs to give you a call back, can they leave a voicemail: [] Yes [x] No    King Barrett Rep   12/16/24 09:31 EST

## 2024-12-17 RX ORDER — ROPINIROLE 1 MG/1
1 TABLET, FILM COATED ORAL NIGHTLY
Qty: 90 TABLET | Refills: 1 | Status: SHIPPED | OUTPATIENT
Start: 2024-12-17

## 2024-12-17 RX ORDER — HYDROCODONE BITARTRATE AND ACETAMINOPHEN 7.5; 325 MG/1; MG/1
1 TABLET ORAL EVERY 6 HOURS PRN
Qty: 60 TABLET | Refills: 0 | Status: SHIPPED | OUTPATIENT
Start: 2024-12-17

## 2024-12-23 ENCOUNTER — OFFICE VISIT (OUTPATIENT)
Dept: FAMILY MEDICINE CLINIC | Facility: CLINIC | Age: 59
End: 2024-12-23
Payer: COMMERCIAL

## 2024-12-23 VITALS
HEART RATE: 79 BPM | DIASTOLIC BLOOD PRESSURE: 88 MMHG | HEIGHT: 69 IN | BODY MASS INDEX: 31.7 KG/M2 | SYSTOLIC BLOOD PRESSURE: 152 MMHG | WEIGHT: 214 LBS | OXYGEN SATURATION: 95 %

## 2024-12-23 DIAGNOSIS — I10 PRIMARY HYPERTENSION: ICD-10-CM

## 2024-12-23 DIAGNOSIS — I25.10 ATHEROSCLEROSIS OF CORONARY ARTERY OF NATIVE HEART WITHOUT ANGINA PECTORIS, UNSPECIFIED VESSEL OR LESION TYPE: ICD-10-CM

## 2024-12-23 PROCEDURE — 99214 OFFICE O/P EST MOD 30 MIN: CPT | Performed by: FAMILY MEDICINE

## 2024-12-23 PROCEDURE — 3079F DIAST BP 80-89 MM HG: CPT | Performed by: FAMILY MEDICINE

## 2024-12-23 PROCEDURE — 3044F HG A1C LEVEL LT 7.0%: CPT | Performed by: FAMILY MEDICINE

## 2024-12-23 PROCEDURE — 3077F SYST BP >= 140 MM HG: CPT | Performed by: FAMILY MEDICINE

## 2024-12-23 RX ORDER — AMLODIPINE BESYLATE 10 MG/1
10 TABLET ORAL DAILY
Qty: 90 TABLET | Refills: 1 | Status: SHIPPED | OUTPATIENT
Start: 2024-12-23

## 2024-12-23 RX ORDER — ATORVASTATIN CALCIUM 80 MG/1
80 TABLET, FILM COATED ORAL DAILY
Qty: 90 TABLET | Refills: 1 | Status: SHIPPED | OUTPATIENT
Start: 2024-12-23

## 2024-12-23 RX ORDER — METOPROLOL TARTRATE 50 MG
50 TABLET ORAL 2 TIMES DAILY
Qty: 180 TABLET | Refills: 1 | Status: SHIPPED | OUTPATIENT
Start: 2024-12-23

## 2024-12-23 RX ORDER — CYCLOBENZAPRINE HCL 10 MG
10 TABLET ORAL 3 TIMES DAILY PRN
Qty: 30 TABLET | Refills: 0 | Status: SHIPPED | OUTPATIENT
Start: 2024-12-23

## 2024-12-23 RX ORDER — CLOPIDOGREL BISULFATE 75 MG/1
75 TABLET ORAL DAILY
Qty: 90 TABLET | Refills: 1 | Status: SHIPPED | OUTPATIENT
Start: 2024-12-23

## 2024-12-23 RX ORDER — BENAZEPRIL HYDROCHLORIDE 20 MG/1
20 TABLET ORAL DAILY
Qty: 90 TABLET | Refills: 1 | Status: SHIPPED | OUTPATIENT
Start: 2024-12-23

## 2024-12-24 NOTE — PROGRESS NOTES
Follow Up Office Visit      Date of Visit:  2024   Patient Name: Ibrahima Martinez  : 1965   MRN: [id number]     Chief Complaint:    Chief Complaint   Patient presents with    Follow-up     3 month follow up     Shoulder Pain     Neck and shoulder pain, unable to lt lift arm       History of Present Illness: Ibrahima Martinez is a 59 y.o. male who is here today for follow up.    History of Present Illness  The patient is a 59-year-old gentleman here to follow up on chronic conditions.    He reports experiencing muscle pain in his arm for the past 3 to 4 days, which has progressively worsened to the point where he is unable to elevate his arm. He speculates that the discomfort may be due to an incorrect sleeping position. He also mentions a popping sound when he rotates his head. Despite attempts at self-management with a heating pad and various stretches, the pain persists. He has a TENS unit at home but has not used it recently. He recalls a previous injury to the same arm involving torn muscles.    His blood pressure was elevated during this visit, which he attributes to pain and stress. He underwent an MRI last week but has not yet received the results.    MEDICATIONS  Current: benazepril, Plavix, hydrocodone      Subjective      Review of Systems:   Review of Systems   Constitutional:  Negative for fatigue and fever.   HENT:  Negative for congestion and ear pain.    Respiratory:  Negative for apnea, cough, chest tightness and shortness of breath.    Cardiovascular:  Negative for chest pain.   Gastrointestinal:  Negative for abdominal pain, constipation, diarrhea and nausea.   Musculoskeletal:  Negative for arthralgias.   Psychiatric/Behavioral:  Negative for depressed mood and stress.        Past Medical History:   Past Medical History:   Diagnosis Date    Back problem     Diabetes     Heart disease     Hypertension        Past Surgical History:   Past Surgical History:   Procedure Laterality Date     CARDIAC CATHETERIZATION      CORONARY ARTERY BYPASS GRAFT  03/28/2022    [place]ima to LAD, reverse saphenous vein graft to 1st diagonal, reverse saphenous vein graft to ramus intermeduiusand reverse saphenous vein graft sequential to PDA then to first right posterior lateral branch artery, then to the second posterolateral artery (Dr Yogi Mcgregor    CORONARY STENT PLACEMENT      EPIDURAL BLOCK  2021    For right-sided sciatica pain.        Family History:   Family History   Problem Relation Age of Onset    Heart attack Father     Hypertension Father     Heart disease Father     Arthritis Mother        Social History:   Social History     Socioeconomic History    Marital status:    Tobacco Use    Smoking status: Some Days     Current packs/day: 1.00     Average packs/day: 1 pack/day for 15.0 years (15.0 ttl pk-yrs)     Types: Cigarettes    Smokeless tobacco: Never   Vaping Use    Vaping status: Never Used   Substance and Sexual Activity    Alcohol use: No    Drug use: Never    Sexual activity: Defer       Medications:     Current Outpatient Medications:     Acetaminophen Extra Strength 500 MG tablet, , Disp: , Rfl:     albuterol sulfate  (90 Base) MCG/ACT inhaler, Inhale 2 puffs Every 4 (Four) Hours As Needed for Wheezing., Disp: 18 g, Rfl: 2    amLODIPine (NORVASC) 10 MG tablet, Take 1 tablet by mouth Daily., Disp: 90 tablet, Rfl: 1    aspirin 81 MG tablet, Take  by mouth Daily., Disp: , Rfl:     atorvastatin (LIPITOR) 80 MG tablet, Take 1 tablet by mouth Daily., Disp: 90 tablet, Rfl: 1    benazepril (LOTENSIN) 20 MG tablet, Take 1 tablet by mouth Daily., Disp: 90 tablet, Rfl: 1    clopidogrel (PLAVIX) 75 MG tablet, Take 1 tablet by mouth Daily., Disp: 90 tablet, Rfl: 1    gabapentin (NEURONTIN) 600 MG tablet, Take 1 tablet by mouth 4 (Four) Times a Day., Disp: 120 tablet, Rfl: 3    Glucose Blood (Blood Glucose Test Strips 333) strip, 1 strip by In Vitro route Daily., Disp: 100 strip, Rfl: 5    glucose  "monitor monitoring kit, Use 1 each Daily. Test fingerstick blood sugar daily prn diabetes, Disp: 1 each, Rfl: 0    HYDROcodone-acetaminophen (NORCO) 7.5-325 MG per tablet, Take 1 tablet by mouth Every 6 (Six) Hours As Needed for Moderate Pain., Disp: 60 tablet, Rfl: 0    ibuprofen (ADVIL,MOTRIN) 800 MG tablet, , Disp: , Rfl:     Lancets misc, Use 1 Lancet Daily., Disp: 100 each, Rfl: 5    metoprolol tartrate (LOPRESSOR) 50 MG tablet, Take 1 tablet by mouth 2 (Two) Times a Day., Disp: 180 tablet, Rfl: 1    mometasone-formoterol (DULERA 100) 100-5 MCG/ACT inhaler, Inhale 2 puffs 2 (Two) Times a Day., Disp: 1 each, Rfl: 5    nitroglycerin (NITROSTAT) 0.4 MG SL tablet, , Disp: , Rfl:     ONETOUCH DELICA LANCETS 33G misc, , Disp: , Rfl:     Oxymetazoline HCl (NASAL SPRAY NA), into the nostril(s) as directed by provider., Disp: , Rfl:     rOPINIRole (REQUIP) 1 MG tablet, Take 1 tablet by mouth Every Night. Take 1 hour before bedtime., Disp: 90 tablet, Rfl: 1    sildenafil (Viagra) 100 MG tablet, Take 1 tablet by mouth Daily As Needed for Erectile Dysfunction., Disp: 30 tablet, Rfl: 1    Stool Softener 100 MG capsule, , Disp: , Rfl:     tadalafil (Cialis) 20 MG tablet, Take 1 tablet by mouth Daily As Needed for Erectile Dysfunction., Disp: 15 tablet, Rfl: 5    Testosterone Cypionate (DEPOTESTOTERONE CYPIONATE) 200 MG/ML injection, INJECT 1ML INTRAMUSCULAR EVERY 10 DAYS, Disp: 10 mL, Rfl: 0    cyclobenzaprine (FLEXERIL) 10 MG tablet, Take 1 tablet by mouth 3 (Three) Times a Day As Needed for Muscle Spasms., Disp: 30 tablet, Rfl: 0    Allergies:   No Known Allergies    Objective     Physical Exam:  Vital Signs:   Vitals:    12/23/24 1339   BP: 152/88   Pulse: 79   SpO2: 95%   Weight: 97.1 kg (214 lb)   Height: 175.3 cm (69\")     Body mass index is 31.6 kg/m².     Physical Exam  Vitals and nursing note reviewed.   Constitutional:       General: He is not in acute distress.     Appearance: Normal appearance. He is not " ill-appearing.   HENT:      Head: Normocephalic and atraumatic.      Right Ear: Tympanic membrane and ear canal normal.      Left Ear: Tympanic membrane and ear canal normal.      Nose: Nose normal.   Cardiovascular:      Rate and Rhythm: Normal rate and regular rhythm.      Heart sounds: Normal heart sounds.   Pulmonary:      Effort: Pulmonary effort is normal.      Breath sounds: Normal breath sounds.   Neurological:      Mental Status: He is alert and oriented to person, place, and time. Mental status is at baseline.   Psychiatric:         Mood and Affect: Mood normal.       Physical Exam      Procedures    Results    Assessment / Plan      Assessment/Plan:   Diagnoses and all orders for this visit:    1. Primary hypertension  -     benazepril (LOTENSIN) 20 MG tablet; Take 1 tablet by mouth Daily.  Dispense: 90 tablet; Refill: 1  -     clopidogrel (PLAVIX) 75 MG tablet; Take 1 tablet by mouth Daily.  Dispense: 90 tablet; Refill: 1  -     atorvastatin (LIPITOR) 80 MG tablet; Take 1 tablet by mouth Daily.  Dispense: 90 tablet; Refill: 1  -     metoprolol tartrate (LOPRESSOR) 50 MG tablet; Take 1 tablet by mouth 2 (Two) Times a Day.  Dispense: 180 tablet; Refill: 1    2. Atherosclerosis of coronary artery of native heart without angina pectoris, unspecified vessel or lesion type  -     clopidogrel (PLAVIX) 75 MG tablet; Take 1 tablet by mouth Daily.  Dispense: 90 tablet; Refill: 1  -     atorvastatin (LIPITOR) 80 MG tablet; Take 1 tablet by mouth Daily.  Dispense: 90 tablet; Refill: 1  -     metoprolol tartrate (LOPRESSOR) 50 MG tablet; Take 1 tablet by mouth 2 (Two) Times a Day.  Dispense: 180 tablet; Refill: 1    Other orders  -     cyclobenzaprine (FLEXERIL) 10 MG tablet; Take 1 tablet by mouth 3 (Three) Times a Day As Needed for Muscle Spasms.  Dispense: 30 tablet; Refill: 0  -     amLODIPine (NORVASC) 10 MG tablet; Take 1 tablet by mouth Daily.  Dispense: 90 tablet; Refill: 1       Assessment & Plan  1. Muscle  strain.  The symptoms suggest a strain in the trapezius muscle, likely due to an incorrect sleeping position. He is advised to apply ice and heat alternately for 10 minutes each, 2 to 3 times daily. The use of a TENS unit is recommended to help relax the muscles. A prescription for Flexeril 10 mg, to be taken up to three times daily, has been provided. If symptoms persist, an MRI may be considered.    2. Elevated blood pressure.  His blood pressure is slightly elevated, potentially due to pain and stress. A refill for benazepril has been sent to the pharmacy.    3. Medication management.  Refills for Plavix and his cholesterol medication have been sent to the pharmacy. The hydrocodone prescription was refilled a few days ago.    Follow-up  The patient will follow up in 2 months.        Follow Up:   No follow-ups on file.    Chavo Duenas  [facility]     Patient or patient representative verbalized consent for the use of Ambient Listening during the visit with  Chavo Duenas MD for chart documentation. 12/24/2024  08:26 EST

## 2024-12-30 ENCOUNTER — OFFICE VISIT (OUTPATIENT)
Dept: FAMILY MEDICINE CLINIC | Facility: CLINIC | Age: 59
End: 2024-12-30
Payer: COMMERCIAL

## 2024-12-30 VITALS
WEIGHT: 209 LBS | DIASTOLIC BLOOD PRESSURE: 100 MMHG | HEART RATE: 69 BPM | BODY MASS INDEX: 30.96 KG/M2 | OXYGEN SATURATION: 95 % | HEIGHT: 69 IN | SYSTOLIC BLOOD PRESSURE: 160 MMHG

## 2024-12-30 DIAGNOSIS — J40 BRONCHITIS: Primary | ICD-10-CM

## 2024-12-30 DIAGNOSIS — R05.1 ACUTE COUGH: ICD-10-CM

## 2024-12-30 DIAGNOSIS — J43.9 PULMONARY EMPHYSEMA, UNSPECIFIED EMPHYSEMA TYPE: ICD-10-CM

## 2024-12-30 LAB
EXPIRATION DATE: NORMAL
FLUAV AG UPPER RESP QL IA.RAPID: NOT DETECTED
FLUBV AG UPPER RESP QL IA.RAPID: NOT DETECTED
INTERNAL CONTROL: NORMAL
Lab: NORMAL
SARS-COV-2 AG UPPER RESP QL IA.RAPID: NOT DETECTED

## 2024-12-30 PROCEDURE — 3077F SYST BP >= 140 MM HG: CPT | Performed by: NURSE PRACTITIONER

## 2024-12-30 PROCEDURE — 99214 OFFICE O/P EST MOD 30 MIN: CPT | Performed by: NURSE PRACTITIONER

## 2024-12-30 PROCEDURE — 87428 SARSCOV & INF VIR A&B AG IA: CPT | Performed by: NURSE PRACTITIONER

## 2024-12-30 PROCEDURE — 1159F MED LIST DOCD IN RCRD: CPT | Performed by: NURSE PRACTITIONER

## 2024-12-30 PROCEDURE — 3080F DIAST BP >= 90 MM HG: CPT | Performed by: NURSE PRACTITIONER

## 2024-12-30 PROCEDURE — 3044F HG A1C LEVEL LT 7.0%: CPT | Performed by: NURSE PRACTITIONER

## 2024-12-30 PROCEDURE — 1160F RVW MEDS BY RX/DR IN RCRD: CPT | Performed by: NURSE PRACTITIONER

## 2024-12-30 RX ORDER — AZITHROMYCIN 250 MG/1
TABLET, FILM COATED ORAL
Qty: 6 TABLET | Refills: 0 | Status: SHIPPED | OUTPATIENT
Start: 2024-12-30 | End: 2025-01-03

## 2024-12-30 RX ORDER — DEXTROMETHORPHAN HYDROBROMIDE AND PROMETHAZINE HYDROCHLORIDE 15; 6.25 MG/5ML; MG/5ML
5 SYRUP ORAL 4 TIMES DAILY PRN
Qty: 120 ML | Refills: 0 | Status: SHIPPED | OUTPATIENT
Start: 2024-12-30

## 2024-12-30 NOTE — PROGRESS NOTES
"Chief Complaint  Cough    Subjective          Ibrahima Martinez presents to [facility]  History of Present Illness  Pt has had cough, congestion, fever, and body aches since Wednesday. He had a high fever on Friday which improved the next day. He denies known sick contacts.   Cough  Associated symptoms include chills, a fever, a sore throat and wheezing. Pertinent negatives include no chest pain or shortness of breath.       History of Present Illness      Objective   Vital Signs:   /100   Pulse 69   Ht 175.3 cm (69\")   Wt 94.8 kg (209 lb)   SpO2 95%   BMI 30.86 kg/m²     Body mass index is 30.86 kg/m².    Review of Systems   Constitutional:  Positive for chills and fever. Negative for fatigue.   HENT:  Positive for congestion and sore throat.    Respiratory:  Positive for cough and wheezing. Negative for shortness of breath.    Cardiovascular:  Negative for chest pain, palpitations and leg swelling.   Neurological:  Negative for syncope.   Psychiatric/Behavioral:  The patient is not nervous/anxious.           Current Outpatient Medications:     Acetaminophen Extra Strength 500 MG tablet, , Disp: , Rfl:     albuterol sulfate  (90 Base) MCG/ACT inhaler, Inhale 2 puffs Every 4 (Four) Hours As Needed for Wheezing., Disp: 18 g, Rfl: 2    amLODIPine (NORVASC) 10 MG tablet, Take 1 tablet by mouth Daily., Disp: 90 tablet, Rfl: 1    aspirin 81 MG tablet, Take  by mouth Daily., Disp: , Rfl:     atorvastatin (LIPITOR) 80 MG tablet, Take 1 tablet by mouth Daily., Disp: 90 tablet, Rfl: 1    azithromycin (Zithromax Z-Willian) 250 MG tablet, Take 2 tablets by mouth Daily for 1 day, THEN 1 tablet Daily for 4 days., Disp: 6 tablet, Rfl: 0    benazepril (LOTENSIN) 20 MG tablet, Take 1 tablet by mouth Daily., Disp: 90 tablet, Rfl: 1    clopidogrel (PLAVIX) 75 MG tablet, Take 1 tablet by mouth Daily., Disp: 90 tablet, Rfl: 1    cyclobenzaprine (FLEXERIL) 10 MG tablet, Take 1 tablet by mouth 3 " (Three) Times a Day As Needed for Muscle Spasms., Disp: 30 tablet, Rfl: 0    gabapentin (NEURONTIN) 600 MG tablet, Take 1 tablet by mouth 4 (Four) Times a Day., Disp: 120 tablet, Rfl: 3    Glucose Blood (Blood Glucose Test Strips 333) strip, 1 strip by In Vitro route Daily., Disp: 100 strip, Rfl: 5    glucose monitor monitoring kit, Use 1 each Daily. Test fingerstick blood sugar daily prn diabetes, Disp: 1 each, Rfl: 0    HYDROcodone-acetaminophen (NORCO) 7.5-325 MG per tablet, Take 1 tablet by mouth Every 6 (Six) Hours As Needed for Moderate Pain., Disp: 60 tablet, Rfl: 0    ibuprofen (ADVIL,MOTRIN) 800 MG tablet, , Disp: , Rfl:     Lancets misc, Use 1 Lancet Daily., Disp: 100 each, Rfl: 5    metoprolol tartrate (LOPRESSOR) 50 MG tablet, Take 1 tablet by mouth 2 (Two) Times a Day., Disp: 180 tablet, Rfl: 1    mometasone-formoterol (DULERA 100) 100-5 MCG/ACT inhaler, Inhale 2 puffs 2 (Two) Times a Day., Disp: 1 each, Rfl: 5    nitroglycerin (NITROSTAT) 0.4 MG SL tablet, , Disp: , Rfl:     ONETOUCH DELICA LANCETS 33G misc, , Disp: , Rfl:     Oxymetazoline HCl (NASAL SPRAY NA), into the nostril(s) as directed by provider., Disp: , Rfl:     promethazine-dextromethorphan (PROMETHAZINE-DM) 6.25-15 MG/5ML syrup, Take 5 mL by mouth 4 (Four) Times a Day As Needed for Cough., Disp: 120 mL, Rfl: 0    rOPINIRole (REQUIP) 1 MG tablet, Take 1 tablet by mouth Every Night. Take 1 hour before bedtime., Disp: 90 tablet, Rfl: 1    sildenafil (Viagra) 100 MG tablet, Take 1 tablet by mouth Daily As Needed for Erectile Dysfunction., Disp: 30 tablet, Rfl: 1    Stool Softener 100 MG capsule, , Disp: , Rfl:     tadalafil (Cialis) 20 MG tablet, Take 1 tablet by mouth Daily As Needed for Erectile Dysfunction., Disp: 15 tablet, Rfl: 5    Testosterone Cypionate (DEPOTESTOTERONE CYPIONATE) 200 MG/ML injection, INJECT 1ML INTRAMUSCULAR EVERY 10 DAYS, Disp: 10 mL, Rfl: 0      Allergies: Patient has no known allergies.    Physical  Exam  Constitutional:       Appearance: Normal appearance.   HENT:      Head: Normocephalic.   Eyes:      Conjunctiva/sclera: Conjunctivae normal.      Pupils: Pupils are equal, round, and reactive to light.   Cardiovascular:      Rate and Rhythm: Normal rate and regular rhythm.      Heart sounds: Normal heart sounds.   Pulmonary:      Effort: Pulmonary effort is normal.      Breath sounds: Normal breath sounds.      Comments: Mild wheezing  Abdominal:      Tenderness: There is no abdominal tenderness.   Musculoskeletal:         General: Normal range of motion.   Skin:     General: Skin is warm and dry.      Capillary Refill: Capillary refill takes less than 2 seconds.   Neurological:      General: No focal deficit present.      Mental Status: He is alert and oriented to person, place, and time.   Psychiatric:         Mood and Affect: Mood normal.         Behavior: Behavior normal.         Thought Content: Thought content normal.         Judgment: Judgment normal.          Physical Exam         Result Review :                Results            Assessment and Plan    Diagnoses and all orders for this visit:    1. Bronchitis (Primary)  Comments:  Increase fluids. Mucinex and Phen DM PRN. Finish antibiotics. Albuterol PRN. RTC for worsened sx and if not improving in 1 week.  Orders:  -     azithromycin (Zithromax Z-Willian) 250 MG tablet; Take 2 tablets by mouth Daily for 1 day, THEN 1 tablet Daily for 4 days.  Dispense: 6 tablet; Refill: 0  -     promethazine-dextromethorphan (PROMETHAZINE-DM) 6.25-15 MG/5ML syrup; Take 5 mL by mouth 4 (Four) Times a Day As Needed for Cough.  Dispense: 120 mL; Refill: 0    2. Acute cough  -     POCT SARS-CoV-2 Antigen IGLESIA + Flu    3. Pulmonary emphysema, unspecified emphysema type  Comments:  Continue Albuterol PRN                  Follow Up   Return in about 1 week (around 1/6/2025) for if not improving or sooner if symptoms worsen.  Patient was given instructions and counseling  regarding his condition or for health maintenance advice. Please see specific information pulled into the AVS if appropriate.     MANUELA Angelo

## 2024-12-31 ENCOUNTER — OFFICE VISIT (OUTPATIENT)
Dept: ORTHOPEDIC SURGERY | Facility: CLINIC | Age: 59
End: 2024-12-31
Payer: COMMERCIAL

## 2024-12-31 VITALS
SYSTOLIC BLOOD PRESSURE: 140 MMHG | BODY MASS INDEX: 30.96 KG/M2 | WEIGHT: 209 LBS | HEIGHT: 69 IN | DIASTOLIC BLOOD PRESSURE: 72 MMHG

## 2024-12-31 DIAGNOSIS — M19.012 ARTHRITIS OF LEFT GLENOHUMERAL JOINT: ICD-10-CM

## 2024-12-31 DIAGNOSIS — M17.0 BILATERAL PRIMARY OSTEOARTHRITIS OF KNEE: ICD-10-CM

## 2024-12-31 DIAGNOSIS — M25.512 LEFT SHOULDER PAIN, UNSPECIFIED CHRONICITY: Primary | ICD-10-CM

## 2024-12-31 RX ORDER — BUPIVACAINE HYDROCHLORIDE 2.5 MG/ML
4 INJECTION, SOLUTION EPIDURAL; INFILTRATION; INTRACAUDAL
Status: COMPLETED | OUTPATIENT
Start: 2024-12-31 | End: 2024-12-31

## 2024-12-31 RX ORDER — BUPIVACAINE HYDROCHLORIDE 2.5 MG/ML
2 INJECTION, SOLUTION EPIDURAL; INFILTRATION; INTRACAUDAL
Status: COMPLETED | OUTPATIENT
Start: 2024-12-31 | End: 2024-12-31

## 2024-12-31 RX ORDER — TRIAMCINOLONE ACETONIDE 40 MG/ML
80 INJECTION, SUSPENSION INTRA-ARTICULAR; INTRAMUSCULAR
Status: COMPLETED | OUTPATIENT
Start: 2024-12-31 | End: 2024-12-31

## 2024-12-31 RX ORDER — LIDOCAINE HYDROCHLORIDE 10 MG/ML
2 INJECTION, SOLUTION EPIDURAL; INFILTRATION; INTRACAUDAL; PERINEURAL
Status: COMPLETED | OUTPATIENT
Start: 2024-12-31 | End: 2024-12-31

## 2024-12-31 RX ORDER — LIDOCAINE HYDROCHLORIDE 10 MG/ML
4 INJECTION, SOLUTION EPIDURAL; INFILTRATION; INTRACAUDAL; PERINEURAL
Status: COMPLETED | OUTPATIENT
Start: 2024-12-31 | End: 2024-12-31

## 2024-12-31 RX ORDER — TRIAMCINOLONE ACETONIDE 40 MG/ML
40 INJECTION, SUSPENSION INTRA-ARTICULAR; INTRAMUSCULAR
Status: COMPLETED | OUTPATIENT
Start: 2024-12-31 | End: 2024-12-31

## 2024-12-31 RX ADMIN — LIDOCAINE HYDROCHLORIDE 4 ML: 10 INJECTION, SOLUTION EPIDURAL; INFILTRATION; INTRACAUDAL; PERINEURAL at 08:52

## 2024-12-31 RX ADMIN — TRIAMCINOLONE ACETONIDE 80 MG: 40 INJECTION, SUSPENSION INTRA-ARTICULAR; INTRAMUSCULAR at 08:47

## 2024-12-31 RX ADMIN — LIDOCAINE HYDROCHLORIDE 2 ML: 10 INJECTION, SOLUTION EPIDURAL; INFILTRATION; INTRACAUDAL; PERINEURAL at 08:47

## 2024-12-31 RX ADMIN — BUPIVACAINE HYDROCHLORIDE 2 ML: 2.5 INJECTION, SOLUTION EPIDURAL; INFILTRATION; INTRACAUDAL at 08:47

## 2024-12-31 RX ADMIN — TRIAMCINOLONE ACETONIDE 40 MG: 40 INJECTION, SUSPENSION INTRA-ARTICULAR; INTRAMUSCULAR at 08:52

## 2024-12-31 RX ADMIN — BUPIVACAINE HYDROCHLORIDE 4 ML: 2.5 INJECTION, SOLUTION EPIDURAL; INFILTRATION; INTRACAUDAL at 08:52

## 2024-12-31 NOTE — PROGRESS NOTES
[facility] [place] [facility] Office Visit     Office Visit       Date: 12/31/2024   Patient Name: Ibrahima Martinez  MRN: [id number]  YOB: 1965    Referring Physician: No ref. provider found     Chief Complaint:   Chief Complaint   Patient presents with   • Left Shoulder - Pain       History of Present Illness:   Ibrahima Martinez is a 59 y.o. male who presents with new problem of: left shoulder pain.  Onset: atraumatic and gradual in nature. The issue has been ongoing for 2 week(s). Pain is a 7/10 on the pain scale. Pain is described as burning, throbbing, stabbing, and shooting. Associated symptoms include pain. The pain is worse with walking, standing, sitting, climbing stairs, sleeping, working, leisure, lying on affected side, rising from seated position, and any movement of the joint; Nothing improve the pain. Previous treatments have included: nothing.    Subjective   Review of Systems: Review of Systems   Constitutional:  Negative for chills, fever, unexpected weight gain and unexpected weight loss.   HENT:  Negative for congestion, postnasal drip and rhinorrhea.    Eyes:  Negative for blurred vision.   Respiratory:  Negative for shortness of breath.    Cardiovascular:  Negative for leg swelling.   Gastrointestinal:  Negative for abdominal pain, nausea and vomiting.   Genitourinary:  Negative for difficulty urinating.   Musculoskeletal:  Positive for arthralgias. Negative for gait problem, joint swelling and myalgias.   Skin:  Negative for skin lesions and wound.   Neurological:  Negative for dizziness, weakness, light-headedness and numbness.   Hematological:  Does not bruise/bleed easily.   Psychiatric/Behavioral:  Negative for depressed mood.         I have reviewed the following portions of the patient's history:History of Present Illness and review of systems.    Past Medical History:   Past Medical History:   Diagnosis Date   • Back problem    • Diabetes     • Heart disease    • Hypertension        Past Surgical History:   Past Surgical History:   Procedure Laterality Date   • CARDIAC CATHETERIZATION     • CORONARY ARTERY BYPASS GRAFT  03/28/2022    Lima to LAD, reverse saphenous vein graft to 1st diagonal, reverse saphenous vein graft to ramus intermeduiusand reverse saphenous vein graft sequential to PDA then to first right posterior lateral branch artery, then to the second posterolateral artery (Dr Yogi Jacoba   • CORONARY STENT PLACEMENT     • EPIDURAL BLOCK  2021    For right-sided sciatica pain.        Family History:   Family History   Problem Relation Age of Onset   • Heart attack Father    • Hypertension Father    • Heart disease Father    • Arthritis Mother        Social History:   Social History     Socioeconomic History   • Marital status:    Tobacco Use   • Smoking status: Some Days     Current packs/day: 1.00     Average packs/day: 1 pack/day for 15.0 years (15.0 ttl pk-yrs)     Types: Cigarettes   • Smokeless tobacco: Never   Vaping Use   • Vaping status: Never Used   Substance and Sexual Activity   • Alcohol use: No   • Drug use: Never   • Sexual activity: Defer       Medications:   Current Outpatient Medications:   •  Acetaminophen Extra Strength 500 MG tablet, , Disp: , Rfl:   •  albuterol sulfate  (90 Base) MCG/ACT inhaler, Inhale 2 puffs Every 4 (Four) Hours As Needed for Wheezing., Disp: 18 g, Rfl: 2  •  amLODIPine (NORVASC) 10 MG tablet, Take 1 tablet by mouth Daily., Disp: 90 tablet, Rfl: 1  •  aspirin 81 MG tablet, Take  by mouth Daily., Disp: , Rfl:   •  atorvastatin (LIPITOR) 80 MG tablet, Take 1 tablet by mouth Daily., Disp: 90 tablet, Rfl: 1  •  azithromycin (Zithromax Z-Willian) 250 MG tablet, Take 2 tablets by mouth Daily for 1 day, THEN 1 tablet Daily for 4 days., Disp: 6 tablet, Rfl: 0  •  benazepril (LOTENSIN) 20 MG tablet, Take 1 tablet by mouth Daily., Disp: 90 tablet, Rfl: 1  •  clopidogrel (PLAVIX) 75 MG tablet, Take 1  tablet by mouth Daily., Disp: 90 tablet, Rfl: 1  •  cyclobenzaprine (FLEXERIL) 10 MG tablet, Take 1 tablet by mouth 3 (Three) Times a Day As Needed for Muscle Spasms., Disp: 30 tablet, Rfl: 0  •  gabapentin (NEURONTIN) 600 MG tablet, Take 1 tablet by mouth 4 (Four) Times a Day., Disp: 120 tablet, Rfl: 3  •  Glucose Blood (Blood Glucose Test Strips 333) strip, 1 strip by In Vitro route Daily., Disp: 100 strip, Rfl: 5  •  glucose monitor monitoring kit, Use 1 each Daily. Test fingerstick blood sugar daily prn diabetes, Disp: 1 each, Rfl: 0  •  HYDROcodone-acetaminophen (NORCO) 7.5-325 MG per tablet, Take 1 tablet by mouth Every 6 (Six) Hours As Needed for Moderate Pain., Disp: 60 tablet, Rfl: 0  •  ibuprofen (ADVIL,MOTRIN) 800 MG tablet, , Disp: , Rfl:   •  Lancets misc, Use 1 Lancet Daily., Disp: 100 each, Rfl: 5  •  metoprolol tartrate (LOPRESSOR) 50 MG tablet, Take 1 tablet by mouth 2 (Two) Times a Day., Disp: 180 tablet, Rfl: 1  •  mometasone-formoterol (DULERA 100) 100-5 MCG/ACT inhaler, Inhale 2 puffs 2 (Two) Times a Day., Disp: 1 each, Rfl: 5  •  nitroglycerin (NITROSTAT) 0.4 MG SL tablet, , Disp: , Rfl:   •  ONETOUCH DELICA LANCETS 33G misc, , Disp: , Rfl:   •  Oxymetazoline HCl (NASAL SPRAY NA), into the nostril(s) as directed by provider., Disp: , Rfl:   •  promethazine-dextromethorphan (PROMETHAZINE-DM) 6.25-15 MG/5ML syrup, Take 5 mL by mouth 4 (Four) Times a Day As Needed for Cough., Disp: 120 mL, Rfl: 0  •  rOPINIRole (REQUIP) 1 MG tablet, Take 1 tablet by mouth Every Night. Take 1 hour before bedtime., Disp: 90 tablet, Rfl: 1  •  sildenafil (Viagra) 100 MG tablet, Take 1 tablet by mouth Daily As Needed for Erectile Dysfunction., Disp: 30 tablet, Rfl: 1  •  Stool Softener 100 MG capsule, , Disp: , Rfl:   •  tadalafil (Cialis) 20 MG tablet, Take 1 tablet by mouth Daily As Needed for Erectile Dysfunction., Disp: 15 tablet, Rfl: 5  •  Testosterone Cypionate (DEPOTESTOTERONE CYPIONATE) 200 MG/ML injection,  "INJECT 1ML INTRAMUSCULAR EVERY 10 DAYS, Disp: 10 mL, Rfl: 0    Allergies: No Known Allergies    I reviewed the patient's chief complaint, history of present illness, review of systems, past medical history, surgical history, family history, social history, medications and allergy list.     Objective    Vital Signs:   Vitals:    12/31/24 0807   BP: 140/72   Weight: 94.8 kg (209 lb)   Height: 175.3 cm (69\")     Body mass index is 30.86 kg/m².   BMI is >= 30 and <35. (Class 1 Obesity). The following options were offered after discussion;: exercise counseling/recommendations and nutrition counseling/recommendations      In this visit the patient was advised to stop smoking and was offered tobacco cessation measures and resources, including NRT and/or medication intervention. At least 3 minutes was spent on face-to-face counseling regarding smoking cessation.     Ortho Exam:  Cardiovascular System: Arterial Pulses Right: radial normal. Arterial Pulses Left: radial normal.   C-Spine/Neck: Active Range of Motion: no crepitus or pain elicited on motion and flexion normal, extension normal, rotation normal, and lateral flexion normal.   Shoulders: Inspection Right: no misalignment, erythema, induration, swelling, or warmth and AC prominence normal. Inspection Left: no misalignment, erythema, induration, swelling, or warmth and AC prominence normal. Bony Palpation Right: no tenderness of the clavicle, the acromioclavicular joint, the greater tuberosity, or the bicipital groove. Bony Palpation Left: tenderness of the clavicle lateral one-third, the acromioclavicular joint, the greater tuberosity, and the bicipital groove and no tenderness of the sternoclavicular joint. Soft Tissue Palpation Left: tenderness of the supraspinatus, the infraspinatus, the glenohumeral joint region, and the lateral cuff insertion. Active Range of Motion Left: limited. Special Tests Right: Hawkin's test positive and empty can sign positive. Special " Tests Left: Hawkin's test positive, Neer's test positive, Barbour's test positive, and empty can sign positive.   exam RIGHT shoulder: forward flexion approximately 140 degrees. Abduction 140 degrees. Internal rotation L4. Motor testing supraspinatus 5/5  exam LEFT shoulder: forward flexion approximately 70 degrees. Abduction 70 degrees. Internal rotation L4. Motor testing supraspinatus 4/5    Results Review:   Imaging Results (Last 24 Hours)       Procedure Component Value Units Date/Time    XR Shoulder 2+ View Left [[id number]] Resulted: 12/31/24 0757     Updated: 12/31/24 0804        I personally reviewed and interpreted radiographs of the left shoulder from 12/31/2024.  No acute fracture or dislocation.  Moderate degenerative changes noted in both the glenohumeral and AC joints.  Humeral head is also slightly elevated in the glenoid narrowing the subacromial space.  No evidence of calcific tendinitis.    Procedures    Assessment / Plan    Assessment/Plan:   Diagnoses and all orders for this visit:    1. Left shoulder pain, unspecified chronicity (Primary)  -     XR Shoulder 2+ View Left    2. Arthritis of left glenohumeral joint    3. Bilateral primary osteoarthritis of knee    Other orders  -     - Large Joint Arthrocentesis: L glenohumeral  -     - Large Joint Arthrocentesis    Patient presents today for evaluation of left shoulder pain.  Symptoms began without any mechanism of injury on 12/20/2024.  He has been taking Tylenol and hydrocodone but without significant relief of symptoms.  He has worked as a  so he has chronic use of the shoulders.  Has grossly limited range of motion and strength today on physical exam.  Radiographs of the left shoulder show degenerative changes particular to the glenohumeral joint.  Subacromial space is narrowed with elevation of the humeral head but does not give rotator cuff symptoms today. Will treat her for the glenohumeral joint arthritis.  Continue with the  medications as listed before.  I will inject corticosteroid into the glenohumeral joint under ultrasound guidance today.  Will also inject his left knee with corticosteroid for previously seen primary knee osteoarthritis.  He will follow-up as his symptoms dictate.  Encouraged to work on his range of motion as well.    Previous documentation reviewed: 12/23/2024-office visit-Chavo Duenas MD.    Previous imaging studies reviewed: 12/31/2024-radiographs of the left shoulder.    Previous laboratory results reviewed: 9/24/2024-TSH 0.675.    Follow Up:   Return if symptoms worsen or fail to improve.      Harshil Ag MD  [facility]

## 2024-12-31 NOTE — PROGRESS NOTES
Procedure   - Large Joint Arthrocentesis: L knee on 12/31/2024 8:52 AM  Indications: pain  Details: 21 G needle, ultrasound-guided superolateral approach  Medications: 4 mL bupivacaine (PF) 0.25 %; 4 mL lidocaine PF 1% 1 %; 40 mg triamcinolone acetonide 40 MG/ML  Outcome: tolerated well, no immediate complications  Procedure, treatment alternatives, risks and benefits explained, specific risks discussed. Consent was given by the patient. Immediately prior to procedure a time out was called to verify the correct patient, procedure, equipment, support staff and site/side marked as required. Patient was prepped and draped in the usual sterile fashion.

## 2024-12-31 NOTE — PROGRESS NOTES
Procedure   - Large Joint Arthrocentesis: L glenohumeral on 12/31/2024 8:47 AM  Indications: pain  Details: 25 G needle, ultrasound-guided posterior approach  Medications: 2 mL bupivacaine (PF) 0.25 %; 2 mL lidocaine PF 1% 1 %; 80 mg triamcinolone acetonide 40 MG/ML  Outcome: tolerated well, no immediate complications  Procedure, treatment alternatives, risks and benefits explained, specific risks discussed. Consent was given by the patient. Immediately prior to procedure a time out was called to verify the correct patient, procedure, equipment, support staff and site/side marked as required. Patient was prepped and draped in the usual sterile fashion.

## 2025-01-14 ENCOUNTER — CLINICAL SUPPORT (OUTPATIENT)
Dept: ORTHOPEDIC SURGERY | Facility: CLINIC | Age: 60
End: 2025-01-14
Payer: COMMERCIAL

## 2025-01-14 DIAGNOSIS — M16.11 PRIMARY OSTEOARTHRITIS OF RIGHT HIP: Primary | ICD-10-CM

## 2025-01-14 DIAGNOSIS — M25.561 CHRONIC PAIN OF RIGHT KNEE: ICD-10-CM

## 2025-01-14 DIAGNOSIS — G89.29 CHRONIC PAIN OF RIGHT KNEE: ICD-10-CM

## 2025-01-14 RX ORDER — LIDOCAINE HYDROCHLORIDE 10 MG/ML
2 INJECTION, SOLUTION EPIDURAL; INFILTRATION; INTRACAUDAL; PERINEURAL
Status: COMPLETED | OUTPATIENT
Start: 2025-01-14 | End: 2025-01-14

## 2025-01-14 RX ORDER — BUPIVACAINE HYDROCHLORIDE 2.5 MG/ML
2 INJECTION, SOLUTION EPIDURAL; INFILTRATION; INTRACAUDAL
Status: COMPLETED | OUTPATIENT
Start: 2025-01-14 | End: 2025-01-14

## 2025-01-14 RX ORDER — HYDROCODONE BITARTRATE AND ACETAMINOPHEN 7.5; 325 MG/1; MG/1
1 TABLET ORAL EVERY 6 HOURS PRN
Qty: 60 TABLET | Refills: 0 | Status: SHIPPED | OUTPATIENT
Start: 2025-01-14

## 2025-01-14 RX ORDER — TRIAMCINOLONE ACETONIDE 40 MG/ML
80 INJECTION, SUSPENSION INTRA-ARTICULAR; INTRAMUSCULAR
Status: COMPLETED | OUTPATIENT
Start: 2025-01-14 | End: 2025-01-14

## 2025-01-14 RX ADMIN — LIDOCAINE HYDROCHLORIDE 2 ML: 10 INJECTION, SOLUTION EPIDURAL; INFILTRATION; INTRACAUDAL; PERINEURAL at 13:21

## 2025-01-14 RX ADMIN — TRIAMCINOLONE ACETONIDE 80 MG: 40 INJECTION, SUSPENSION INTRA-ARTICULAR; INTRAMUSCULAR at 13:21

## 2025-01-14 RX ADMIN — BUPIVACAINE HYDROCHLORIDE 2 ML: 2.5 INJECTION, SOLUTION EPIDURAL; INFILTRATION; INTRACAUDAL at 13:21

## 2025-01-14 NOTE — TELEPHONE ENCOUNTER
Caller: Martinez Ibrahima Gino    Relationship: Self    Best call back number: [phone]    Requested Prescriptions:   Requested Prescriptions     Pending Prescriptions Disp Refills    HYDROcodone-acetaminophen (NORCO) 7.5-325 MG per tablet 60 tablet 0     Sig: Take 1 tablet by mouth Every 6 (Six) Hours As Needed for Moderate Pain.        Pharmacy where request should be sent: [facility] [address] [phone] [facility] [phone]      Last office visit with prescribing clinician: 12/23/2024   Last telemedicine visit with prescribing clinician: Visit date not found   Next office visit with prescribing clinician: 2/25/2025     Additional details provided by patient: PATIENT NEEDS A REFILL     Does the patient have less than a 3 day supply:  [] Yes  [x] No    Would you like a call back once the refill request has been completed: [] Yes [x] No    If the office needs to give you a call back, can they leave a voicemail: [] Yes [x] No    King Richter Rep   01/14/25 09:31 EST

## 2025-01-14 NOTE — PROGRESS NOTES
Procedure   - Large Joint Arthrocentesis: R hip joint on 1/14/2025 1:21 PM  Indications: pain  Details: 22 G needle, anterior approach  Medications: 2 mL lidocaine PF 1% 1 %; 2 mL bupivacaine (PF) 0.25 %; 80 mg triamcinolone acetonide 40 MG/ML  Outcome: tolerated well, no immediate complications  Procedure, treatment alternatives, risks and benefits explained, specific risks discussed. Consent was given by the patient. Immediately prior to procedure a time out was called to verify the correct patient, procedure, equipment, support staff and site/side marked as required. Patient was prepped and draped in the usual sterile fashion.        59-year-old male presents with right hip pain from right hip osteoarthritis.  Patient is here today for ultrasound-guided right hip joint corticosteroid injection.  Previous office documentation and images were personally reviewed prior to the visit.  We discussed them in detail how they correlate to experienced symptoms.  I explained the procedure in detail.  I answered all questions to the best my ability.  Risks and benefits as well as post procedure instructions were provided.  Patient elected to proceed and tolerated this procedure well.  See procedure note.  Follow-up with me will be on an as-needed basis.

## 2025-01-21 ENCOUNTER — OFFICE VISIT (OUTPATIENT)
Dept: ORTHOPEDIC SURGERY | Facility: CLINIC | Age: 60
End: 2025-01-21
Payer: COMMERCIAL

## 2025-01-21 VITALS
HEIGHT: 69 IN | BODY MASS INDEX: 30.96 KG/M2 | DIASTOLIC BLOOD PRESSURE: 90 MMHG | WEIGHT: 209 LBS | SYSTOLIC BLOOD PRESSURE: 162 MMHG

## 2025-01-21 DIAGNOSIS — M19.012 ARTHRITIS OF LEFT GLENOHUMERAL JOINT: ICD-10-CM

## 2025-01-21 DIAGNOSIS — M75.102 TEAR OF LEFT SUPRASPINATUS TENDON: Primary | ICD-10-CM

## 2025-01-21 NOTE — PROGRESS NOTES
[facility] [place] [facility] Office Follow Up Visit     Office Follow Up      Date: 01/21/2025   Patient Name: Ibrahima Martinez  MRN: [id number]  YOB: 1965    Referring Physician: No ref. provider found     Chief Complaint:   Chief Complaint   Patient presents with    Follow-up     1 week follow up -- Left shoulder pain, Nontraumatic complete tear of right rotator cuff     History of Present Illness: Ibrahima Martinez is a 59 y.o. male who returns to clinic today for follow up on bilateral shoulder pain. His pain is a 7 /10 on the pain scale. Patient has tried the following previous treatments injections: Cortisone/Visco. He mentions current symptoms of same as prior , pain , popping, stiffness, and giving Way . He states that these treatments have stayed the same.    Subjective     Review of Systems: Review of Systems   Constitutional:  Negative for chills, fever, unexpected weight gain and unexpected weight loss.   HENT:  Negative for congestion, postnasal drip and rhinorrhea.    Eyes:  Negative for blurred vision.   Respiratory:  Negative for shortness of breath.    Cardiovascular:  Negative for leg swelling.   Gastrointestinal:  Negative for abdominal pain, nausea and vomiting.   Genitourinary:  Negative for difficulty urinating.   Musculoskeletal:  Positive for arthralgias. Negative for gait problem, joint swelling and myalgias.   Skin:  Negative for skin lesions and wound.   Neurological:  Negative for dizziness, weakness, light-headedness and numbness.   Hematological:  Does not bruise/bleed easily.   Psychiatric/Behavioral:  Negative for depressed mood.         Medications:   Current Outpatient Medications:     Acetaminophen Extra Strength 500 MG tablet, , Disp: , Rfl:     albuterol sulfate  (90 Base) MCG/ACT inhaler, Inhale 2 puffs Every 4 (Four) Hours As Needed for Wheezing., Disp: 18 g, Rfl: 2    amLODIPine (NORVASC) 10 MG tablet, Take 1 tablet by mouth  Daily., Disp: 90 tablet, Rfl: 1    aspirin 81 MG tablet, Take  by mouth Daily., Disp: , Rfl:     atorvastatin (LIPITOR) 80 MG tablet, Take 1 tablet by mouth Daily., Disp: 90 tablet, Rfl: 1    benazepril (LOTENSIN) 20 MG tablet, Take 1 tablet by mouth Daily., Disp: 90 tablet, Rfl: 1    clopidogrel (PLAVIX) 75 MG tablet, Take 1 tablet by mouth Daily., Disp: 90 tablet, Rfl: 1    cyclobenzaprine (FLEXERIL) 10 MG tablet, Take 1 tablet by mouth 3 (Three) Times a Day As Needed for Muscle Spasms., Disp: 30 tablet, Rfl: 0    gabapentin (NEURONTIN) 600 MG tablet, Take 1 tablet by mouth 4 (Four) Times a Day., Disp: 120 tablet, Rfl: 3    Glucose Blood (Blood Glucose Test Strips 333) strip, 1 strip by In Vitro route Daily., Disp: 100 strip, Rfl: 5    glucose monitor monitoring kit, Use 1 each Daily. Test fingerstick blood sugar daily prn diabetes, Disp: 1 each, Rfl: 0    HYDROcodone-acetaminophen (NORCO) 7.5-325 MG per tablet, Take 1 tablet by mouth Every 6 (Six) Hours As Needed for Moderate Pain., Disp: 60 tablet, Rfl: 0    ibuprofen (ADVIL,MOTRIN) 800 MG tablet, , Disp: , Rfl:     Lancets misc, Use 1 Lancet Daily., Disp: 100 each, Rfl: 5    metoprolol tartrate (LOPRESSOR) 50 MG tablet, Take 1 tablet by mouth 2 (Two) Times a Day., Disp: 180 tablet, Rfl: 1    mometasone-formoterol (DULERA 100) 100-5 MCG/ACT inhaler, Inhale 2 puffs 2 (Two) Times a Day., Disp: 1 each, Rfl: 5    nitroglycerin (NITROSTAT) 0.4 MG SL tablet, , Disp: , Rfl:     ONETOUCH DELICA LANCETS 33G misc, , Disp: , Rfl:     Oxymetazoline HCl (NASAL SPRAY NA), into the nostril(s) as directed by provider., Disp: , Rfl:     promethazine-dextromethorphan (PROMETHAZINE-DM) 6.25-15 MG/5ML syrup, Take 5 mL by mouth 4 (Four) Times a Day As Needed for Cough., Disp: 120 mL, Rfl: 0    rOPINIRole (REQUIP) 1 MG tablet, Take 1 tablet by mouth Every Night. Take 1 hour before bedtime., Disp: 90 tablet, Rfl: 1    sildenafil (Viagra) 100 MG tablet, Take 1 tablet by mouth Daily As  "Needed for Erectile Dysfunction., Disp: 30 tablet, Rfl: 1    Stool Softener 100 MG capsule, , Disp: , Rfl:     tadalafil (Cialis) 20 MG tablet, Take 1 tablet by mouth Daily As Needed for Erectile Dysfunction., Disp: 15 tablet, Rfl: 5    Testosterone Cypionate (DEPOTESTOTERONE CYPIONATE) 200 MG/ML injection, INJECT 1ML INTRAMUSCULAR EVERY 10 DAYS, Disp: 10 mL, Rfl: 0    Allergies: No Known Allergies    I have reviewed and updated the patient's chief complaint, history of present illness, review of systems, past medical history, surgical history, family history, social history, medications and allergy list as appropriate.     Objective      Vital Signs:   Vitals:    01/21/25 1304   BP: 162/90   Weight: 94.8 kg (209 lb)   Height: 175.3 cm (69\")     Body mass index is 30.86 kg/m².  BMI is >= 30 and <35. (Class 1 Obesity). The following options were offered after discussion;: exercise counseling/recommendations and nutrition counseling/recommendations      In this visit the patient was advised to stop smoking and was offered tobacco cessation measures and resources, including NRT and/or medication intervention. At least 3 minutes was spent on face-to-face counseling regarding smoking cessation.      Ortho Exam:  Cardiovascular System: Arterial Pulses Right: radial normal. Arterial Pulses Left: radial normal.   C-Spine/Neck: Active Range of Motion: no crepitus or pain elicited on motion and flexion normal, extension normal, rotation normal, and lateral flexion normal.   Shoulders: Inspection Right: no misalignment, erythema, induration, swelling, or warmth and AC prominence normal. Inspection Left: no misalignment, erythema, induration, swelling, or warmth and AC prominence normal. Bony Palpation Right: no tenderness of the clavicle, the acromioclavicular joint, the greater tuberosity, or the bicipital groove. Bony Palpation Left: tenderness of the clavicle lateral one-third, the acromioclavicular joint, the greater " tuberosity, and the bicipital groove and no tenderness of the sternoclavicular joint. Soft Tissue Palpation Left: tenderness of the supraspinatus, the infraspinatus, the glenohumeral joint region, and the lateral cuff insertion. Active Range of Motion Left: limited. Special Tests Right: Hawkin's test positive and empty can sign positive. Special Tests Left: Hawkin's test positive, Neer's test positive, [place]'s test positive, and empty can sign positive.   exam RIGHT shoulder: forward flexion approximately 140 degrees. Abduction 140 degrees. Internal rotation L4. Motor testing supraspinatus 5/5  exam LEFT shoulder: forward flexion approximately 70 degrees. Abduction 70 degrees. Internal rotation L4. Motor testing supraspinatus 4/5    Results Review:   Imaging Results (Last 24 Hours)       ** No results found for the last 24 hours. **            Procedures    Assessment / Plan      Assessment/Plan:   Diagnoses and all orders for this visit:    1. Tear of left supraspinatus tendon (Primary)    2. Arthritis of left glenohumeral joint    Follow-up on left shoulder pain.  Patient notes significant weakness in the left shoulder and grossly reduced range of motion.  Denies any known mechanism of injury.  MRI results show a full-thickness and full width tear of the supraspinatus as well as full-thickness and near full width tear of the infraspinatus and high-grade tear of the biceps tendon.  Other degenerative changes are noted about the shoulder particular to the labrum glenohumeral joint and AC joint.  I discussed these findings with him in detail.  He is significantly limited in his use of the shoulder.  I advised him based off of the MRI findings that weakness and reduced range of motion will be difficult to improve nonoperatively.  The case is complicated by his arthritis.  However, I believe it is most advisable at this time to have him discuss surgical options and candidacy with Dr. Rubalcava who arrange for referral  on that.  He can continue with hydrocodone.  I am happy to see him back for the other orthopedic conditions as needed.    Follow Up:   Return for F/U with Cherelle Ag MD  [facility] [place] [facility] Medicine

## 2025-01-22 RX ORDER — ALBUTEROL SULFATE 90 UG/1
INHALANT RESPIRATORY (INHALATION)
Qty: 8.5 G | Refills: 1 | Status: SHIPPED | OUTPATIENT
Start: 2025-01-22

## 2025-01-28 ENCOUNTER — OFFICE VISIT (OUTPATIENT)
Age: 60
End: 2025-01-28
Payer: COMMERCIAL

## 2025-01-28 VITALS
SYSTOLIC BLOOD PRESSURE: 158 MMHG | HEIGHT: 69 IN | DIASTOLIC BLOOD PRESSURE: 90 MMHG | BODY MASS INDEX: 30.96 KG/M2 | WEIGHT: 209 LBS

## 2025-01-28 DIAGNOSIS — S46.002A INJURY OF LEFT ROTATOR CUFF, INITIAL ENCOUNTER: Primary | ICD-10-CM

## 2025-01-28 DIAGNOSIS — M75.22 BICEPS TENDINITIS OF LEFT UPPER EXTREMITY: ICD-10-CM

## 2025-01-28 DIAGNOSIS — M75.100 EXTERNAL ROTATION OF SHOULDER LAG SIGN: ICD-10-CM

## 2025-01-28 DIAGNOSIS — M75.52 BURSITIS OF LEFT SHOULDER: ICD-10-CM

## 2025-01-28 DIAGNOSIS — M75.42 IMPINGEMENT SYNDROME OF LEFT SHOULDER: ICD-10-CM

## 2025-01-28 DIAGNOSIS — R29.818 PSEUDOPARALYSIS: ICD-10-CM

## 2025-01-28 NOTE — PROGRESS NOTES
[facility] [place] [facility] Office Visit - Taurus Isaacs MD  [facility]    Office Visit       Patient Name: Ibrahima Martinez    Chief Complaint:   Chief Complaint   Patient presents with    Left Shoulder - Pain, Initial Evaluation    Right Shoulder - Pain       Referring Physician: Chavo Duenas MD    History of Present Illness:   Ibrahima Martinez is a 59 y.o. male who presents with left body part: shoulder Reason: pain.  Onset:Onset: atraumatic and gradual in nature. The issue has been ongoing for 2 month(s). Pain is a 6/10 on the pain scale. Pain is described as Pain Characterization: dull, aching, burning, and stabbing. Associated symptoms include Symptoms: pain, stiffness, and weakness . The pain is worse with walking, standing, sitting, climbing stairs, sleeping, working, leisure, lying on affected side, rising from seated position, and any movement of the joint; nothing improve the pain. Previous treatments have included: NSAIDS. I have reviewed the patient's history of present illness as noted/entered above.    I have reviewed the patient's past medical history, surgical history, social history, family history, medications, and allergies as noted in the electronic medical record and as noted/entered.  I have reviewed the patient's review of systems as noted/enter and updated as noted in the patient's HPI.      LEFT SHOULDER  (Contralateral) MRI reviewed and findings consistent with rotator cuff tear arthropathy-based on plain x-rays     He notes there was some confusion at [facility] and they indeed did perform a right shoulder MRI.  The CT findings do confirm right shoulder pain right shoulder MRI and all the images on the disc do show a right shoulder represented.  The report says Lt but indeed this is a right shoulder MRI.  The right shoulder x-rays also show  subchondral cystic changes in the greater tuberosity on plain x-ray of the right shoulder which do match the MRI findings of his right shoulder.    The right shoulder has had difficulties for some time with rotator cuff tear arthropathy but he has regained active range of motion with the right.  He is here today for evaluation of the left shoulder he has profound pseudoparalysis with inability to forward flex or abduct.  He is a  he has weakness overhead with his right side his left shoulder is profoundly weak.  We will order a left shoulder MRI.    Chronic pain management with hydrocodone 7.5 mg tablets.  Pain worsening over the last 2 months  Dr. Ag note reviewed.    Profound weakness left arm almost no abduction or forward flexion on exam.    [facility]      59 y.o. male  Body mass index is 30.86 kg/m².    Subjective   Subjective      Review of Systems   Constitutional: Negative.  Negative for chills, fatigue and fever.   HENT: Negative.  Negative for congestion and dental problem.    Eyes: Negative.  Negative for blurred vision.   Respiratory: Negative.  Negative for shortness of breath.    Cardiovascular: Negative.  Negative for leg swelling.   Gastrointestinal: Negative.  Negative for abdominal pain.   Endocrine: Negative.  Negative for polyuria.   Genitourinary: Negative.  Negative for difficulty urinating.   Musculoskeletal:  Positive for arthralgias.   Skin: Negative.    Allergic/Immunologic: Negative.    Neurological: Negative.    Hematological: Negative.  Negative for adenopathy.   Psychiatric/Behavioral: Negative.  Negative for behavioral problems.         Past Medical History:   Past Medical History:   Diagnosis Date    Back problem     Diabetes     Heart disease     Hypertension        Past Surgical History:   Past Surgical History:   Procedure Laterality Date    CARDIAC CATHETERIZATION      CORONARY ARTERY BYPASS GRAFT  03/28/2022    [place] to LAD, reverse saphenous vein graft to  1st diagonal, reverse saphenous vein graft to ramus intermeduiusand reverse saphenous vein graft sequential to PDA then to first right posterior lateral branch artery, then to the second posterolateral artery (Dr Yogi Mcgregor    CORONARY STENT PLACEMENT      EPIDURAL BLOCK  2021    For right-sided sciatica pain.        Family History:   Family History   Problem Relation Age of Onset    Heart attack Father     Hypertension Father     Heart disease Father     Arthritis Mother        Social History:   Social History     Socioeconomic History    Marital status:    Tobacco Use    Smoking status: Some Days     Current packs/day: 1.00     Average packs/day: 1 pack/day for 15.0 years (15.0 ttl pk-yrs)     Types: Cigarettes     Passive exposure: Current    Smokeless tobacco: Never   Vaping Use    Vaping status: Never Used   Substance and Sexual Activity    Alcohol use: No    Drug use: Never    Sexual activity: Defer       Medications:   Current Outpatient Medications:     Acetaminophen Extra Strength 500 MG tablet, , Disp: , Rfl:     albuterol sulfate  (90 Base) MCG/ACT inhaler, INHALE 2 PUFFS BY MOUTH EVERY 4 (FOUR) HOURS AS NEEDED FOR WHEEZING., Disp: 8.5 g, Rfl: 1    amLODIPine (NORVASC) 10 MG tablet, Take 1 tablet by mouth Daily., Disp: 90 tablet, Rfl: 1    aspirin 81 MG tablet, Take  by mouth Daily., Disp: , Rfl:     atorvastatin (LIPITOR) 80 MG tablet, Take 1 tablet by mouth Daily., Disp: 90 tablet, Rfl: 1    benazepril (LOTENSIN) 20 MG tablet, Take 1 tablet by mouth Daily., Disp: 90 tablet, Rfl: 1    clopidogrel (PLAVIX) 75 MG tablet, Take 1 tablet by mouth Daily., Disp: 90 tablet, Rfl: 1    cyclobenzaprine (FLEXERIL) 10 MG tablet, Take 1 tablet by mouth 3 (Three) Times a Day As Needed for Muscle Spasms., Disp: 30 tablet, Rfl: 0    gabapentin (NEURONTIN) 600 MG tablet, Take 1 tablet by mouth 4 (Four) Times a Day., Disp: 120 tablet, Rfl: 3    Glucose Blood (Blood Glucose Test Strips 333) strip, 1 strip by  "In Vitro route Daily., Disp: 100 strip, Rfl: 5    glucose monitor monitoring kit, Use 1 each Daily. Test fingerstick blood sugar daily prn diabetes, Disp: 1 each, Rfl: 0    HYDROcodone-acetaminophen (NORCO) 7.5-325 MG per tablet, Take 1 tablet by mouth Every 6 (Six) Hours As Needed for Moderate Pain., Disp: 60 tablet, Rfl: 0    ibuprofen (ADVIL,MOTRIN) 800 MG tablet, , Disp: , Rfl:     Lancets misc, Use 1 Lancet Daily., Disp: 100 each, Rfl: 5    metoprolol tartrate (LOPRESSOR) 50 MG tablet, Take 1 tablet by mouth 2 (Two) Times a Day., Disp: 180 tablet, Rfl: 1    mometasone-formoterol (DULERA 100) 100-5 MCG/ACT inhaler, Inhale 2 puffs 2 (Two) Times a Day., Disp: 1 each, Rfl: 5    nitroglycerin (NITROSTAT) 0.4 MG SL tablet, , Disp: , Rfl:     ONETOUCH DELICA LANCETS 33G misc, , Disp: , Rfl:     Oxymetazoline HCl (NASAL SPRAY NA), into the nostril(s) as directed by provider., Disp: , Rfl:     rOPINIRole (REQUIP) 1 MG tablet, Take 1 tablet by mouth Every Night. Take 1 hour before bedtime., Disp: 90 tablet, Rfl: 1    sildenafil (Viagra) 100 MG tablet, Take 1 tablet by mouth Daily As Needed for Erectile Dysfunction., Disp: 30 tablet, Rfl: 1    tadalafil (Cialis) 20 MG tablet, Take 1 tablet by mouth Daily As Needed for Erectile Dysfunction., Disp: 15 tablet, Rfl: 5    Testosterone Cypionate (DEPOTESTOTERONE CYPIONATE) 200 MG/ML injection, INJECT 1ML INTRAMUSCULAR EVERY 10 DAYS, Disp: 10 mL, Rfl: 0    Allergies: No Known Allergies    The following portions of the patient's history were reviewed and updated as appropriate: allergies, current medications, past family history, past medical history, past social history, past surgical history and problem list.        Objective    Objective      Vital Signs:   Vitals:    01/28/25 1326   BP: 158/90   Weight: 94.8 kg (209 lb)   Height: 175.3 cm (69\")       Ortho Exam:  LEFT SHOULDER  General: no acute distress, comfortable  Vitals reviewed in chart    Musculoskeletal Exam    SIDE: " LEFT worse than right  Shoulder Exam:    Tenderness: rotator cuff and biceps    Range of motion measurements (degrees)  Forward flexion/Abduction/External rotation at side/ER at 90/IR at 90/IR position  Active: Profound weakness in lag signs, positive arm drop on the left 45 degrees abduction 45 degrees forward flexion weakness with ER lag signs positive arm drop.  Passive: Profound limitations, pain limited.    Painful arc of motion: yes  No evidence of septic joint  Pain with forward flexion and abduction greater: 90 degrees  Impingement testing Neer's test - positive/painful  Impingement testing Hawkin's test - positive/painful    Rotator Cuff Testing:  Tenderness to palpation at rotator cuff - YES  Rotator cuff testing Kem's test - positive  Rotator cuff testing External rotation -ER lag sign  Rotator cuff testing Lag signs -profound pseudoparalysis  Rotator cuff testing Belly press - no  Pain with abduction great than 90 degrees - yes    Scapular dyskinesis - present, abnormal scapular motion    Long head of the biceps testing:  Ray's test for biceps & Speed's test - painful  Bicipital groove tenderness to palpation/tenderness to palpation of biceps tendon - painful        Contralateral right shoulder shows weakness with rotator cuff testing but able to go overhead.    Results Review:   Imaging Results (Last 24 Hours)       ** No results found for the last 24 hours. **          XR Shoulder 2+ View Left    Result Date: 12/31/2024  Impression: Mild osteoarthritic changes of the glenohumeral and acromioclavicular joints. There are enthesopathic changes of the greater tuberosity and correlate for any rotator cuff pathology. Electronically Signed: Bola Castillo MD  12/31/2024 3:02 PM EST  Workstation ID: [id number]    I personally reviewed the x-rays above degenerative changes noted.        The MRI in the system report and images reviewed of the right shoulder showing severe rotator cuff tear arthropathy but the  right is much less symptomatic than the left    Procedures             Assessment / Plan      Assessment/Plan:   Problem List Items Addressed This Visit          Musculoskeletal and Injuries    Injury of left rotator cuff - Primary    Relevant Orders    MRI Shoulder Left Without Contrast    Bursitis of left shoulder    Relevant Orders    MRI Shoulder Left Without Contrast    Impingement syndrome of left shoulder    Relevant Orders    MRI Shoulder Left Without Contrast    Biceps tendinitis of left upper extremity    Relevant Orders    MRI Shoulder Left Without Contrast       Other    External rotation of shoulder lag sign    Relevant Orders    MRI Shoulder Left Without Contrast    Pseudoparalysis    Relevant Orders    MRI Shoulder Left Without Contrast       LEFT SHOULDER  MRI of the shoulder is recommended.  Indication: suspected rotator cuff tear, positive lag signs positive arm drop and significant rotator cuff tear arthropathy noted on the contralateral side  The MRI is critical to evaluate for rotator cuff tearing and will help with possible surgical planning.  Left shoulder has profound weakness positive arm drop positive lag signs and MRI is definitely indicated.  The most recent MRI he had for clarification was of the contralateral shoulder but the left is the most symptomatic.  Left shoulder MRI indicated at this time      Contralateral right shoulder-baseline deficiencies but he has overcome these deficiencies over the years and he is aware of this.  We did an MRI of the most painful shoulder which is profoundly pseudoparalytic on the left.    Follow Up: After left shoulder MRI        Taurus Isaacs MD, FAAOS  Orthopedic Surgeon, Shoulder Surgery  [facility]  Orthopedics and Sports Medicine  [address], [address]  [place] [address]    & New Location:  [facility] Location  [address], [address]  [place] [address]    01/28/25  14:03 EST

## 2025-02-11 ENCOUNTER — TELEPHONE (OUTPATIENT)
Age: 60
End: 2025-02-11
Payer: COMMERCIAL

## 2025-02-11 NOTE — TELEPHONE ENCOUNTER
I called [facility] for the third time and have left 2 messages about the MRI report being corrected from the left shoulder to the right shoulder.  It has been 2 weeks since his appointment. I will follow up if we don't receive an updated fax.    Nydia

## 2025-02-15 ENCOUNTER — HOSPITAL ENCOUNTER (OUTPATIENT)
Facility: HOSPITAL | Age: 60
Discharge: HOME | End: 2025-02-15
Payer: COMMERCIAL

## 2025-02-15 DIAGNOSIS — M75.42 IMPINGEMENT SYNDROME OF LEFT SHOULDER: ICD-10-CM

## 2025-02-15 DIAGNOSIS — G89.29 CHRONIC PAIN OF RIGHT KNEE: ICD-10-CM

## 2025-02-15 DIAGNOSIS — M75.22 BICEPS TENDINITIS OF LEFT UPPER EXTREMITY: ICD-10-CM

## 2025-02-15 DIAGNOSIS — M75.100 EXTERNAL ROTATION OF SHOULDER LAG SIGN: ICD-10-CM

## 2025-02-15 DIAGNOSIS — R29.818 PSEUDOPARALYSIS: ICD-10-CM

## 2025-02-15 DIAGNOSIS — M75.52 BURSITIS OF LEFT SHOULDER: ICD-10-CM

## 2025-02-15 DIAGNOSIS — M25.561 CHRONIC PAIN OF RIGHT KNEE: ICD-10-CM

## 2025-02-15 DIAGNOSIS — S46.002A INJURY OF LEFT ROTATOR CUFF, INITIAL ENCOUNTER: ICD-10-CM

## 2025-02-15 PROCEDURE — 73221 MRI JOINT UPR EXTREM W/O DYE: CPT

## 2025-02-17 RX ORDER — HYDROCODONE BITARTRATE AND ACETAMINOPHEN 7.5; 325 MG/1; MG/1
1 TABLET ORAL EVERY 6 HOURS PRN
Qty: 60 TABLET | Refills: 0 | Status: SHIPPED | OUTPATIENT
Start: 2025-02-17

## 2025-02-19 DIAGNOSIS — E29.1 MALE HYPOGONADISM: ICD-10-CM

## 2025-02-19 RX ORDER — TESTOSTERONE CYPIONATE 200 MG/ML
INJECTION, SOLUTION INTRAMUSCULAR
Qty: 10 ML | Refills: 0 | Status: SHIPPED | OUTPATIENT
Start: 2025-02-19

## 2025-02-19 NOTE — TELEPHONE ENCOUNTER
I have called 3 times and have not received a call back or an updated fax with the information corrected.    The order # in the report still says Left and it should say Right.    I will request the original report to be taken out of the chart.      Nydia

## 2025-02-25 ENCOUNTER — OFFICE VISIT (OUTPATIENT)
Age: 60
End: 2025-02-25
Payer: COMMERCIAL

## 2025-02-25 ENCOUNTER — OFFICE VISIT (OUTPATIENT)
Dept: FAMILY MEDICINE CLINIC | Facility: CLINIC | Age: 60
End: 2025-02-25
Payer: COMMERCIAL

## 2025-02-25 VITALS
BODY MASS INDEX: 30.96 KG/M2 | DIASTOLIC BLOOD PRESSURE: 82 MMHG | HEIGHT: 69 IN | WEIGHT: 209 LBS | SYSTOLIC BLOOD PRESSURE: 146 MMHG

## 2025-02-25 VITALS
SYSTOLIC BLOOD PRESSURE: 180 MMHG | WEIGHT: 207 LBS | OXYGEN SATURATION: 98 % | DIASTOLIC BLOOD PRESSURE: 100 MMHG | HEIGHT: 69 IN | BODY MASS INDEX: 30.66 KG/M2 | HEART RATE: 69 BPM

## 2025-02-25 DIAGNOSIS — M19.012 ARTHRITIS OF LEFT GLENOHUMERAL JOINT: ICD-10-CM

## 2025-02-25 DIAGNOSIS — G89.29 CHRONIC PAIN OF BOTH KNEES: ICD-10-CM

## 2025-02-25 DIAGNOSIS — G25.81 RESTLESS LEG SYNDROME: ICD-10-CM

## 2025-02-25 DIAGNOSIS — G89.29 CHRONIC PAIN OF BOTH SHOULDERS: ICD-10-CM

## 2025-02-25 DIAGNOSIS — M54.16 LUMBAR RADICULOPATHY: ICD-10-CM

## 2025-02-25 DIAGNOSIS — R29.818 PSEUDOPARALYSIS: ICD-10-CM

## 2025-02-25 DIAGNOSIS — M75.22 BICEPS TENDINITIS OF LEFT UPPER EXTREMITY: ICD-10-CM

## 2025-02-25 DIAGNOSIS — E11.65 TYPE 2 DIABETES MELLITUS WITH HYPERGLYCEMIA, WITHOUT LONG-TERM CURRENT USE OF INSULIN: ICD-10-CM

## 2025-02-25 DIAGNOSIS — I10 PRIMARY HYPERTENSION: ICD-10-CM

## 2025-02-25 DIAGNOSIS — M75.52 BURSITIS OF LEFT SHOULDER: ICD-10-CM

## 2025-02-25 DIAGNOSIS — M25.562 CHRONIC PAIN OF BOTH KNEES: ICD-10-CM

## 2025-02-25 DIAGNOSIS — M25.512 CHRONIC PAIN OF BOTH SHOULDERS: ICD-10-CM

## 2025-02-25 DIAGNOSIS — M25.511 CHRONIC PAIN OF BOTH SHOULDERS: ICD-10-CM

## 2025-02-25 DIAGNOSIS — M75.122 NONTRAUMATIC COMPLETE TEAR OF LEFT ROTATOR CUFF: ICD-10-CM

## 2025-02-25 DIAGNOSIS — M75.42 IMPINGEMENT SYNDROME OF LEFT SHOULDER: ICD-10-CM

## 2025-02-25 DIAGNOSIS — M25.561 CHRONIC PAIN OF BOTH KNEES: ICD-10-CM

## 2025-02-25 DIAGNOSIS — Z12.5 PROSTATE CANCER SCREENING: Primary | ICD-10-CM

## 2025-02-25 DIAGNOSIS — S46.002A INJURY OF LEFT ROTATOR CUFF, INITIAL ENCOUNTER: Primary | ICD-10-CM

## 2025-02-25 DIAGNOSIS — M75.100 EXTERNAL ROTATION OF SHOULDER LAG SIGN: ICD-10-CM

## 2025-02-25 DIAGNOSIS — J43.9 PULMONARY EMPHYSEMA, UNSPECIFIED EMPHYSEMA TYPE: ICD-10-CM

## 2025-02-25 PROCEDURE — 3080F DIAST BP >= 90 MM HG: CPT | Performed by: FAMILY MEDICINE

## 2025-02-25 PROCEDURE — 99214 OFFICE O/P EST MOD 30 MIN: CPT | Performed by: FAMILY MEDICINE

## 2025-02-25 PROCEDURE — 3077F SYST BP >= 140 MM HG: CPT | Performed by: FAMILY MEDICINE

## 2025-02-25 RX ORDER — BENAZEPRIL HYDROCHLORIDE 40 MG/1
40 TABLET ORAL DAILY
Qty: 90 TABLET | Refills: 1 | Status: SHIPPED | OUTPATIENT
Start: 2025-02-25

## 2025-02-25 RX ORDER — ROPINIROLE 2 MG/1
2 TABLET, FILM COATED ORAL NIGHTLY
Qty: 90 TABLET | Refills: 1 | Status: SHIPPED | OUTPATIENT
Start: 2025-02-25

## 2025-02-25 RX ORDER — GABAPENTIN 600 MG/1
600 TABLET ORAL 4 TIMES DAILY
Qty: 120 TABLET | Refills: 3 | Status: SHIPPED | OUTPATIENT
Start: 2025-02-25

## 2025-02-25 RX ORDER — TAMSULOSIN HYDROCHLORIDE 0.4 MG/1
1 CAPSULE ORAL DAILY
Qty: 30 CAPSULE | Refills: 2 | Status: SHIPPED | OUTPATIENT
Start: 2025-02-25

## 2025-02-25 NOTE — PROGRESS NOTES
[facility] Follow Up - Taurus Isaacs MD  [facility]    Office Follow Up Visit       Patient Name: Ibrahima Martinez    Chief Complaint:   Chief Complaint   Patient presents with    Follow-up     1 month MRI f/u (2/15/25)-- Injury of left rotator cuff       Referring Physician: Chavo Duenas MD  - I appreciate the referral    History of Present Illness:   It has been 1  month(s) since Ibrahima Martinez's last visit. Ibrahima Martinez returns to clinic today for F/U: follow-up of leftBody Part: shoulderReason: pain. The issue has been ongoing for 2 month(s). Ibrahima Martinez rates HIS/HER: hispain at 7/10 on the pain scale. Previous/current treatments: nothing. Current symptoms:Symptoms: pain and stiffness. The pain is worse with  raising arm ; nothing improves the pain. Overall, he/she: heis doing the same. I have reviewed the patient's history of present illness as noted/entered above.    I have reviewed the patient's past medical history, surgical history, social history, family history, medications, and allergies as noted in the electronic medical record and as noted/entered.  I have reviewed the patient's review of systems as noted/enter and updated as noted in the patient's HPI.      LEFT SHOULDER  (Contralateral) MRI reviewed and findings consistent with rotator cuff tear arthropathy-based on plain x-rays      He notes there was some confusion at [facility] and they indeed did perform a right shoulder MRI.  The CT findings do confirm right shoulder pain right shoulder MRI and all the images on the disc do show a right shoulder represented.  The report says Lt but indeed this is a right shoulder MRI.  The right shoulder x-rays also show subchondral cystic changes in the greater tuberosity on plain x-ray of the right shoulder which do match the MRI findings  of his right shoulder.     The right shoulder has had difficulties for some time with rotator cuff tear arthropathy but he has regained active range of motion with the right.  He is here today for evaluation of the left shoulder he has profound pseudoparalysis with inability to forward flex or abduct.  He is a  he has weakness overhead with his right side his left shoulder is profoundly weak.  We will order a left shoulder MRI.     Chronic pain management with hydrocodone 7.5 mg tablets.  Pain worsening over the last 2 months  Dr. Ag note reviewed.     Profound weakness left arm almost no abduction or forward flexion on exam.     [facility]        59 y.o. male  Body mass index is 30.86 kg/m².    2/25/2025:  LEFT SHOULDER  Pain persists and profound weakness  MRI 2/15/2025 left shoulder  Left shoulder massive chronic irreparable rotator cuff tears of the supraspinatus and infraspinatus along with long head biceps tearing.  Updated MRI performed and reviewed:  LEFT SHOULDER        Subjective   Subjective      Review of Systems   Constitutional: Negative.  Negative for chills, fatigue and fever.   HENT: Negative.  Negative for congestion and dental problem.    Eyes: Negative.  Negative for blurred vision.   Respiratory: Negative.  Negative for shortness of breath.    Cardiovascular: Negative.  Negative for leg swelling.   Gastrointestinal: Negative.  Negative for abdominal pain.   Endocrine: Negative.  Negative for polyuria.   Genitourinary: Negative.  Negative for difficulty urinating.   Musculoskeletal:  Positive for arthralgias.   Skin: Negative.    Allergic/Immunologic: Negative.    Neurological: Negative.    Hematological: Negative.  Negative for adenopathy.   Psychiatric/Behavioral: Negative.  Negative for behavioral problems.         Past Medical History:   Past Medical History:   Diagnosis Date    Back problem     Diabetes     Heart disease     Hypertension        Past Surgical History:    Past Surgical History:   Procedure Laterality Date    CARDIAC CATHETERIZATION      CORONARY ARTERY BYPASS GRAFT  03/28/2022    Lima to LAD, reverse saphenous vein graft to 1st diagonal, reverse saphenous vein graft to ramus intermeduiusand reverse saphenous vein graft sequential to PDA then to first right posterior lateral branch artery, then to the second posterolateral artery (Dr Yogi Mcgregor    CORONARY STENT PLACEMENT      EPIDURAL BLOCK  2021    For right-sided sciatica pain.        Family History:   Family History   Problem Relation Age of Onset    Heart attack Father     Hypertension Father     Heart disease Father     Arthritis Mother        Social History:   Social History     Socioeconomic History    Marital status:    Tobacco Use    Smoking status: Some Days     Current packs/day: 1.00     Average packs/day: 1 pack/day for 15.0 years (15.0 ttl pk-yrs)     Types: Cigarettes     Passive exposure: Current    Smokeless tobacco: Never   Vaping Use    Vaping status: Never Used   Substance and Sexual Activity    Alcohol use: No    Drug use: Never    Sexual activity: Defer       Medications:   Current Outpatient Medications:     Acetaminophen Extra Strength 500 MG tablet, , Disp: , Rfl:     albuterol sulfate  (90 Base) MCG/ACT inhaler, INHALE 2 PUFFS BY MOUTH EVERY 4 (FOUR) HOURS AS NEEDED FOR WHEEZING., Disp: 8.5 g, Rfl: 1    amLODIPine (NORVASC) 10 MG tablet, Take 1 tablet by mouth Daily., Disp: 90 tablet, Rfl: 1    aspirin 81 MG tablet, Take  by mouth Daily., Disp: , Rfl:     atorvastatin (LIPITOR) 80 MG tablet, Take 1 tablet by mouth Daily., Disp: 90 tablet, Rfl: 1    benazepril (LOTENSIN) 20 MG tablet, Take 1 tablet by mouth Daily., Disp: 90 tablet, Rfl: 1    clopidogrel (PLAVIX) 75 MG tablet, Take 1 tablet by mouth Daily., Disp: 90 tablet, Rfl: 1    cyclobenzaprine (FLEXERIL) 10 MG tablet, Take 1 tablet by mouth 3 (Three) Times a Day As Needed for Muscle Spasms., Disp: 30 tablet, Rfl: 0     gabapentin (NEURONTIN) 600 MG tablet, Take 1 tablet by mouth 4 (Four) Times a Day., Disp: 120 tablet, Rfl: 3    Glucose Blood (Blood Glucose Test Strips 333) strip, 1 strip by In Vitro route Daily., Disp: 100 strip, Rfl: 5    glucose monitor monitoring kit, Use 1 each Daily. Test fingerstick blood sugar daily prn diabetes, Disp: 1 each, Rfl: 0    HYDROcodone-acetaminophen (NORCO) 7.5-325 MG per tablet, TAKE 1 TABLET BY MOUTH EVERY SIX HOURS AS NEEDED FOR MODERATE PAIN, Disp: 60 tablet, Rfl: 0    ibuprofen (ADVIL,MOTRIN) 800 MG tablet, , Disp: , Rfl:     Lancets misc, Use 1 Lancet Daily., Disp: 100 each, Rfl: 5    metoprolol tartrate (LOPRESSOR) 50 MG tablet, Take 1 tablet by mouth 2 (Two) Times a Day., Disp: 180 tablet, Rfl: 1    mometasone-formoterol (DULERA 100) 100-5 MCG/ACT inhaler, Inhale 2 puffs 2 (Two) Times a Day., Disp: 1 each, Rfl: 5    nitroglycerin (NITROSTAT) 0.4 MG SL tablet, , Disp: , Rfl:     ONETOUCH DELICA LANCETS 33G misc, , Disp: , Rfl:     Oxymetazoline HCl (NASAL SPRAY NA), into the nostril(s) as directed by provider., Disp: , Rfl:     rOPINIRole (REQUIP) 1 MG tablet, Take 1 tablet by mouth Every Night. Take 1 hour before bedtime., Disp: 90 tablet, Rfl: 1    sildenafil (Viagra) 100 MG tablet, Take 1 tablet by mouth Daily As Needed for Erectile Dysfunction., Disp: 30 tablet, Rfl: 1    tadalafil (Cialis) 20 MG tablet, Take 1 tablet by mouth Daily As Needed for Erectile Dysfunction., Disp: 15 tablet, Rfl: 5    Testosterone Cypionate (DEPOTESTOTERONE CYPIONATE) 200 MG/ML injection, INJECT 1ML INTRAMUSCULAR EVERY 10 DAYS, Disp: 10 mL, Rfl: 0    Allergies: No Known Allergies    The following portions of the patient's history were reviewed and updated as appropriate: allergies, current medications, past family history, past medical history, past social history, past surgical history and problem list.        Objective    Objective      Vital Signs:   Vitals:    02/25/25 1117   BP: 146/82   Weight: 94.8  "kg (209 lb)   Height: 175.3 cm (69.02\")       Ortho Exam:  Left shoulder profound weakness as anticipated with chronic massive irreparable rotator cuff tears  Pseudoparalytic weakness but is able to fire the deltoid no obvious distal weakness perhaps some baseline neck issues but no numbness or tingling distally    Results Review:  Imaging Results (Last 24 Hours)       ** No results found for the last 24 hours. **            MRI Shoulder Left Without Contrast    Result Date: 2/18/2025  Impression: Massive rotator cuff tearing with complete retracted tears of the supraspinatus and infraspinatus tendons and low to moderate-grade interstitial and articular-sided tearing of the subscapularis tendon. There is fatty atrophy of the supraspinatus and infraspinatus muscle bellies. Nonvisualization of the intra-articular segment of the long head of biceps tendon, likely torn from the anchor. Small glenohumeral joint effusion. Electronically Signed: Bola Castillo MD  2/18/2025 9:08 PM EST  Workstation ID: [id number]    XR Shoulder 2+ View Left    Result Date: 12/31/2024  Impression: Mild osteoarthritic changes of the glenohumeral and acromioclavicular joints. There are enthesopathic changes of the greater tuberosity and correlate for any rotator cuff pathology. Electronically Signed: Bola Castillo MD  12/31/2024 3:02 PM EST  Workstation ID: [id number]      I personally reviewed and personally interpreted the imaging above.  I personally reviewed the MRI above chronic massive irreparable tears of supraspinatus and infraspinatus, partial interstitial tearing subscapularis, severe fatty infiltration atrophy of the supra and infraspinatus.  Chronic tearing of the long head of the biceps    Procedures            Assessment / Plan      Assessment/Plan:   Problem List Items Addressed This Visit          Musculoskeletal and Injuries    Injury of left rotator cuff - Primary    Bursitis of left shoulder    Impingement syndrome of left " shoulder    Biceps tendinitis of left upper extremity       Other    External rotation of shoulder lag sign    Pseudoparalysis     Other Visit Diagnoses       Arthritis of left glenohumeral joint        Nontraumatic complete tear of left rotator cuff                Left shoulder chronic massive irreparable rotator cuff tears.  If surgery was indicated would be more of a reverse shoulder arthroplasty at some point.  Arthroscopy would not be possible given chronic massive irreparable tears.  We are hopeful to avoid surgery.  He may benefit from deltoid strengthening program which provided today he prefers to do this on his own.  Discussed he may have some neck involvement as well but he is able to fire his deltoid and has better strength distally.  But possibly weakness in the deltoid as well.  Counseled on operative versus nonoperative measures and nonoperative measures recommended at this time.    Follow Up: 2 to 3-month  X-rays at next clinic appointment: No x-rays needed      Taurus Isaacs MD, FAAOS  Orthopedic Surgeon, Shoulder Surgery  [facility]  [address], [address]  [place] [address]    & New Location:  [facility] Location  [address], [address]  [place] [address]    02/25/25  12:02 EST

## 2025-02-26 LAB
ALBUMIN SERPL-MCNC: 4.3 G/DL (ref 3.8–4.9)
ALBUMIN/CREAT UR: 1803 MG/G CREAT (ref 0–29)
ALP SERPL-CCNC: 108 IU/L (ref 44–121)
ALT SERPL-CCNC: 19 IU/L (ref 0–44)
AST SERPL-CCNC: 17 IU/L (ref 0–40)
BASOPHILS # BLD AUTO: 0.1 X10E3/UL (ref 0–0.2)
BASOPHILS NFR BLD AUTO: 1 %
BILIRUB SERPL-MCNC: 0.6 MG/DL (ref 0–1.2)
BUN SERPL-MCNC: 15 MG/DL (ref 8–27)
BUN/CREAT SERPL: 10 (ref 10–24)
CALCIUM SERPL-MCNC: 9 MG/DL (ref 8.6–10.2)
CHLORIDE SERPL-SCNC: 101 MMOL/L (ref 96–106)
CHOLEST SERPL-MCNC: 134 MG/DL (ref 100–199)
CO2 SERPL-SCNC: 20 MMOL/L (ref 20–29)
CREAT SERPL-MCNC: 1.48 MG/DL (ref 0.76–1.27)
CREAT UR-MCNC: 48.9 MG/DL
EGFRCR SERPLBLD CKD-EPI 2021: 54 ML/MIN/1.73
EOSINOPHIL # BLD AUTO: 0.1 X10E3/UL (ref 0–0.4)
EOSINOPHIL NFR BLD AUTO: 1 %
ERYTHROCYTE [DISTWIDTH] IN BLOOD BY AUTOMATED COUNT: 13.6 % (ref 11.6–15.4)
GLOBULIN SER CALC-MCNC: 2.3 G/DL (ref 1.5–4.5)
GLUCOSE SERPL-MCNC: 385 MG/DL (ref 70–99)
HBA1C MFR BLD: 12.4 % (ref 4.8–5.6)
HCT VFR BLD AUTO: 55.1 % (ref 37.5–51)
HDLC SERPL-MCNC: 53 MG/DL
HGB BLD-MCNC: 17.6 G/DL (ref 13–17.7)
IMM GRANULOCYTES # BLD AUTO: 0 X10E3/UL (ref 0–0.1)
IMM GRANULOCYTES NFR BLD AUTO: 1 %
LDLC SERPL CALC-MCNC: 56 MG/DL (ref 0–99)
LYMPHOCYTES # BLD AUTO: 1.2 X10E3/UL (ref 0.7–3.1)
LYMPHOCYTES NFR BLD AUTO: 15 %
MCH RBC QN AUTO: 29.2 PG (ref 26.6–33)
MCHC RBC AUTO-ENTMCNC: 31.9 G/DL (ref 31.5–35.7)
MCV RBC AUTO: 91 FL (ref 79–97)
MICROALBUMIN UR-MCNC: 881.8 UG/ML
MONOCYTES # BLD AUTO: 0.5 X10E3/UL (ref 0.1–0.9)
MONOCYTES NFR BLD AUTO: 6 %
NEUTROPHILS # BLD AUTO: 6.3 X10E3/UL (ref 1.4–7)
NEUTROPHILS NFR BLD AUTO: 76 %
PLATELET # BLD AUTO: 163 X10E3/UL (ref 150–450)
POTASSIUM SERPL-SCNC: 3.7 MMOL/L (ref 3.5–5.2)
PROT SERPL-MCNC: 6.6 G/DL (ref 6–8.5)
PSA SERPL-MCNC: 1.9 NG/ML (ref 0–4)
RBC # BLD AUTO: 6.03 X10E6/UL (ref 4.14–5.8)
SODIUM SERPL-SCNC: 138 MMOL/L (ref 134–144)
TRIGL SERPL-MCNC: 145 MG/DL (ref 0–149)
TSH SERPL DL<=0.005 MIU/L-ACNC: 0.93 UIU/ML (ref 0.45–4.5)
VLDLC SERPL CALC-MCNC: 25 MG/DL (ref 5–40)
WBC # BLD AUTO: 8.2 X10E3/UL (ref 3.4–10.8)

## 2025-02-26 NOTE — PROGRESS NOTES
Follow Up Office Visit      Date of Visit:  2025   Patient Name: Ibrahima Martinez  : 1965   MRN: [id number]     Chief Complaint:    Chief Complaint   Patient presents with    Hypertension       History of Present Illness: Ibrahima Martinez is a 59 y.o. male who is here today for follow up.    History of Present Illness  The patient presents for evaluation of left shoulder pain, restless legs syndrome, COPD, hypertension, nocturia, and medication management.    He recently consulted with Dr. Isaacs, a surgeon in [place], regarding his left shoulder pain. The surgeon recommended a total shoulder replacement due to the severity of the condition. He was advised to engage in specific exercises, similar to those he performed for his right shoulder several years ago, which improved after 3 to 4 months. He is uncertain whether the current issue is due to a tear or impingement. He experiences significant pain radiating down his arm. He is right-handed and has not experienced any pain in his right shoulder until a few months ago. He has received injections in his knee, which have been beneficial, and is interested in exploring similar treatment options for his other joints.    He reports that his blood pressure was measured at 142/84 this morning. He is currently on a regimen of benazepril 20 mg, amlodipine, and metoprolol.    He has been prescribed ropinirole for restless legs syndrome but requires a higher dose than prescribed to manage his symptoms effectively. He typically takes one dose at 8:00 or 9:00 PM, but as his symptoms worsen at night, he often needs to take a second dose. He is seeking advice on whether a stronger dosage or an increased quantity of the medication would be more beneficial.    He occasionally uses Dulera for his COPD and has a few doses remaining. He also uses an albuterol inhaler 3 to 4 times per week. He recalls that his insurance company discontinued coverage for one of his  inhalers, but he is unsure which one.    He reports frequent urination at night, characterized by a stop-start pattern. He is interested in trying Flomax to see if it alleviates his symptoms before considering a consultation with a specialist. He also reports experiencing thirst and admits to consuming a significant amount of sweet tea over the past 2 to 3 months.    He is taking testosterone injections and his next dose is due in about 4 days. He uses Cialis or Viagra as needed but notes that they are not as effective as they used to be. He takes Plavix and baby aspirin together. He takes gabapentin 4 times a day and hydrocodone for pain management. He called in his pain medications the other day, but it was too early to refill them. He has been experiencing significant leg issues and is curious if increasing the dose of gabapentin would be beneficial.    MEDICATIONS  Current: Benazepril, amlodipine, metoprolol, ropinirole, testosterone, Plavix, atorvastatin, baby aspirin, gabapentin, hydrocodone, Dulera, albuterol inhaler.      Subjective      Review of Systems:   Review of Systems   Constitutional:  Negative for fatigue and fever.   HENT:  Negative for congestion and ear pain.    Respiratory:  Negative for apnea, cough, chest tightness and shortness of breath.    Cardiovascular:  Negative for chest pain.   Gastrointestinal:  Negative for abdominal pain, constipation, diarrhea and nausea.   Musculoskeletal:  Positive for arthralgias.   Psychiatric/Behavioral:  Negative for depressed mood and stress.        Past Medical History:   Past Medical History:   Diagnosis Date    Back problem     Diabetes     Heart disease     Hypertension        Past Surgical History:   Past Surgical History:   Procedure Laterality Date    CARDIAC CATHETERIZATION      CORONARY ARTERY BYPASS GRAFT  03/28/2022    Lima to LAD, reverse saphenous vein graft to 1st diagonal, reverse saphenous vein graft to ramus intermeduiusand reverse  saphenous vein graft sequential to PDA then to first right posterior lateral branch artery, then to the second posterolateral artery (Dr Yogi Mcgregor    CORONARY STENT PLACEMENT      EPIDURAL BLOCK  2021    For right-sided sciatica pain.        Family History:   Family History   Problem Relation Age of Onset    Heart attack Father     Hypertension Father     Heart disease Father     Arthritis Mother        Social History:   Social History     Socioeconomic History    Marital status:    Tobacco Use    Smoking status: Some Days     Current packs/day: 1.00     Average packs/day: 1 pack/day for 15.0 years (15.0 ttl pk-yrs)     Types: Cigarettes     Passive exposure: Current    Smokeless tobacco: Never   Vaping Use    Vaping status: Never Used   Substance and Sexual Activity    Alcohol use: No    Drug use: Never    Sexual activity: Defer       Medications:     Current Outpatient Medications:     benazepril (LOTENSIN) 40 MG tablet, Take 1 tablet by mouth Daily., Disp: 90 tablet, Rfl: 1    gabapentin (NEURONTIN) 600 MG tablet, Take 1 tablet by mouth 4 (Four) Times a Day., Disp: 120 tablet, Rfl: 3    mometasone-formoterol (DULERA 100) 100-5 MCG/ACT inhaler, Inhale 2 puffs 2 (Two) Times a Day., Disp: 1 each, Rfl: 5    rOPINIRole (REQUIP) 2 MG tablet, Take 1 tablet by mouth Every Night. Take 1 hour before bedtime., Disp: 90 tablet, Rfl: 1    Acetaminophen Extra Strength 500 MG tablet, , Disp: , Rfl:     albuterol sulfate  (90 Base) MCG/ACT inhaler, INHALE 2 PUFFS BY MOUTH EVERY 4 (FOUR) HOURS AS NEEDED FOR WHEEZING., Disp: 8.5 g, Rfl: 1    amLODIPine (NORVASC) 10 MG tablet, Take 1 tablet by mouth Daily., Disp: 90 tablet, Rfl: 1    aspirin 81 MG tablet, Take  by mouth Daily., Disp: , Rfl:     atorvastatin (LIPITOR) 80 MG tablet, Take 1 tablet by mouth Daily., Disp: 90 tablet, Rfl: 1    clopidogrel (PLAVIX) 75 MG tablet, Take 1 tablet by mouth Daily., Disp: 90 tablet, Rfl: 1    cyclobenzaprine (FLEXERIL) 10 MG  "tablet, Take 1 tablet by mouth 3 (Three) Times a Day As Needed for Muscle Spasms., Disp: 30 tablet, Rfl: 0    Glucose Blood (Blood Glucose Test Strips 333) strip, 1 strip by In Vitro route Daily., Disp: 100 strip, Rfl: 5    glucose monitor monitoring kit, Use 1 each Daily. Test fingerstick blood sugar daily prn diabetes, Disp: 1 each, Rfl: 0    HYDROcodone-acetaminophen (NORCO) 7.5-325 MG per tablet, TAKE 1 TABLET BY MOUTH EVERY SIX HOURS AS NEEDED FOR MODERATE PAIN, Disp: 60 tablet, Rfl: 0    ibuprofen (ADVIL,MOTRIN) 800 MG tablet, , Disp: , Rfl:     Lancets misc, Use 1 Lancet Daily., Disp: 100 each, Rfl: 5    metoprolol tartrate (LOPRESSOR) 50 MG tablet, Take 1 tablet by mouth 2 (Two) Times a Day., Disp: 180 tablet, Rfl: 1    nitroglycerin (NITROSTAT) 0.4 MG SL tablet, , Disp: , Rfl:     ONETOUCH DELICA LANCETS 33G misc, , Disp: , Rfl:     Oxymetazoline HCl (NASAL SPRAY NA), into the nostril(s) as directed by provider., Disp: , Rfl:     sildenafil (Viagra) 100 MG tablet, Take 1 tablet by mouth Daily As Needed for Erectile Dysfunction., Disp: 30 tablet, Rfl: 1    tadalafil (Cialis) 20 MG tablet, Take 1 tablet by mouth Daily As Needed for Erectile Dysfunction., Disp: 15 tablet, Rfl: 5    tamsulosin (FLOMAX) 0.4 MG capsule 24 hr capsule, Take 1 capsule by mouth Daily., Disp: 30 capsule, Rfl: 2    Testosterone Cypionate (DEPOTESTOTERONE CYPIONATE) 200 MG/ML injection, INJECT 1ML INTRAMUSCULAR EVERY 10 DAYS, Disp: 10 mL, Rfl: 0    Allergies:   No Known Allergies    Objective     Physical Exam:  Vital Signs:   Vitals:    02/25/25 1345   BP: 180/100   Pulse: 69   SpO2: 98%   Weight: 93.9 kg (207 lb)   Height: 175.3 cm (69\")     Body mass index is 30.57 kg/m².     Physical Exam  Vitals and nursing note reviewed.   Constitutional:       General: He is not in acute distress.     Appearance: Normal appearance. He is not ill-appearing.   HENT:      Head: Normocephalic and atraumatic.      Right Ear: Tympanic membrane and ear " canal normal.      Left Ear: Tympanic membrane and ear canal normal.      Nose: Nose normal.   Cardiovascular:      Rate and Rhythm: Normal rate and regular rhythm.      Heart sounds: Normal heart sounds.   Pulmonary:      Effort: Pulmonary effort is normal.      Breath sounds: Normal breath sounds.   Neurological:      Mental Status: He is alert and oriented to person, place, and time. Mental status is at baseline.   Psychiatric:         Mood and Affect: Mood normal.       Physical Exam      Procedures    Results    Assessment / Plan      Assessment/Plan:   Diagnoses and all orders for this visit:    1. Prostate cancer screening (Primary)  -     PSA Screen    2. Primary hypertension  -     benazepril (LOTENSIN) 40 MG tablet; Take 1 tablet by mouth Daily.  Dispense: 90 tablet; Refill: 1  -     CBC & Differential  -     Comprehensive Metabolic Panel  -     Lipid Panel  -     TSH    3. Type 2 diabetes mellitus with hyperglycemia, without long-term current use of insulin  -     Hemoglobin A1c  -     gabapentin (NEURONTIN) 600 MG tablet; Take 1 tablet by mouth 4 (Four) Times a Day.  Dispense: 120 tablet; Refill: 3  -     Microalbumin / Creatinine Urine Ratio - Urine, Clean Catch    4. Chronic pain of both knees  -     gabapentin (NEURONTIN) 600 MG tablet; Take 1 tablet by mouth 4 (Four) Times a Day.  Dispense: 120 tablet; Refill: 3    5. Lumbar radiculopathy  -     gabapentin (NEURONTIN) 600 MG tablet; Take 1 tablet by mouth 4 (Four) Times a Day.  Dispense: 120 tablet; Refill: 3    6. Chronic pain of both shoulders    7. Restless leg syndrome    8. Pulmonary emphysema, unspecified emphysema type    Other orders  -     rOPINIRole (REQUIP) 2 MG tablet; Take 1 tablet by mouth Every Night. Take 1 hour before bedtime.  Dispense: 90 tablet; Refill: 1  -     tamsulosin (FLOMAX) 0.4 MG capsule 24 hr capsule; Take 1 capsule by mouth Daily.  Dispense: 30 capsule; Refill: 2  -     mometasone-formoterol (DULERA 100) 100-5 MCG/ACT  inhaler; Inhale 2 puffs 2 (Two) Times a Day.  Dispense: 1 each; Refill: 5       Assessment & Plan  1. Left shoulder chronic massive irreparable rotator cuff tears.  The patient was advised by Dr. Isaacs to start doing exercises and return in 3 months to assess progress. He was informed that strengthening other muscles, particularly the deltoid, might be beneficial.    2. Hypertension.  His blood pressure was noted to be higher than normal. The dosage of benazepril will be increased from 20 mg to 40 mg to achieve better control.    3. Restless legs syndrome.  The dosage of ropinirole will be adjusted to 2 mg, and a prescription will be sent to the pharmacy.    4. Chronic obstructive pulmonary disease (COPD).  He was advised to use Dulera daily for maintenance and albuterol as needed for acute shortness of breath. A prescription for Dulera will be resent to the pharmacy.    5. Nocturia.  A prescription for Flomax (tamsulosin) will be provided. A PSA test will be conducted to rule out any underlying conditions. Routine blood work, including testosterone levels and blood glucose, will be checked.    6. Medication management.  He will continue his current regimen of Plavix, atorvastatin, and baby aspirin. A prescription for gabapentin will be refilled. He will continue his current regimen of hydrocodone for pain management. If long-term pain management is required, a referral to a pain management specialist will be considered.        Follow Up:   No follow-ups on file.    Chavo Duenas  [facility]     Patient or patient representative verbalized consent for the use of Ambient Listening during the visit with  Chavo Duenas MD for chart documentation. 2/25/2025  19:45 EST

## 2025-03-17 ENCOUNTER — TELEPHONE (OUTPATIENT)
Dept: FAMILY MEDICINE CLINIC | Facility: CLINIC | Age: 60
End: 2025-03-17
Payer: COMMERCIAL

## 2025-03-17 DIAGNOSIS — M25.561 CHRONIC PAIN OF RIGHT KNEE: ICD-10-CM

## 2025-03-17 DIAGNOSIS — G89.29 CHRONIC PAIN OF RIGHT KNEE: ICD-10-CM

## 2025-03-17 NOTE — TELEPHONE ENCOUNTER
Incoming Refill Request      Medication requested (name and dose): hydrocodone    Pharmacy where request should be sent: [facility]    Additional details provided by patient: PATIENT HAS ABOUT 2 DAYS LEFT     Best call back number: [phone]     Does the patient have less than a 3 day supply:  [x] Yes  [] No    King Rome Rep  03/17/25, 11:11 EDT

## 2025-03-19 DIAGNOSIS — M25.561 CHRONIC PAIN OF RIGHT KNEE: ICD-10-CM

## 2025-03-19 DIAGNOSIS — G89.29 CHRONIC PAIN OF RIGHT KNEE: ICD-10-CM

## 2025-03-19 RX ORDER — HYDROCODONE BITARTRATE AND ACETAMINOPHEN 7.5; 325 MG/1; MG/1
1 TABLET ORAL EVERY 6 HOURS PRN
Qty: 60 TABLET | Refills: 0 | Status: SHIPPED | OUTPATIENT
Start: 2025-03-19

## 2025-03-26 ENCOUNTER — OFFICE VISIT (OUTPATIENT)
Dept: FAMILY MEDICINE CLINIC | Facility: CLINIC | Age: 60
End: 2025-03-26
Payer: COMMERCIAL

## 2025-03-26 VITALS
BODY MASS INDEX: 29.78 KG/M2 | SYSTOLIC BLOOD PRESSURE: 150 MMHG | OXYGEN SATURATION: 96 % | HEART RATE: 94 BPM | WEIGHT: 208 LBS | DIASTOLIC BLOOD PRESSURE: 80 MMHG | HEIGHT: 70 IN

## 2025-03-26 DIAGNOSIS — I10 PRIMARY HYPERTENSION: ICD-10-CM

## 2025-03-26 DIAGNOSIS — N40.1 BENIGN PROSTATIC HYPERPLASIA WITH NOCTURIA: ICD-10-CM

## 2025-03-26 DIAGNOSIS — R35.1 BENIGN PROSTATIC HYPERPLASIA WITH NOCTURIA: ICD-10-CM

## 2025-03-26 DIAGNOSIS — E11.65 TYPE 2 DIABETES MELLITUS WITH HYPERGLYCEMIA, WITHOUT LONG-TERM CURRENT USE OF INSULIN: Primary | ICD-10-CM

## 2025-03-26 PROCEDURE — 3046F HEMOGLOBIN A1C LEVEL >9.0%: CPT | Performed by: FAMILY MEDICINE

## 2025-03-26 PROCEDURE — 3077F SYST BP >= 140 MM HG: CPT | Performed by: FAMILY MEDICINE

## 2025-03-26 PROCEDURE — 99214 OFFICE O/P EST MOD 30 MIN: CPT | Performed by: FAMILY MEDICINE

## 2025-03-26 PROCEDURE — 3079F DIAST BP 80-89 MM HG: CPT | Performed by: FAMILY MEDICINE

## 2025-03-26 RX ORDER — GLIMEPIRIDE 1 MG/1
1 TABLET ORAL
Qty: 90 TABLET | Refills: 1 | Status: SHIPPED | OUTPATIENT
Start: 2025-03-26

## 2025-03-26 RX ORDER — TADALAFIL 20 MG/1
20 TABLET ORAL DAILY PRN
Qty: 30 TABLET | Refills: 5 | Status: SHIPPED | OUTPATIENT
Start: 2025-03-26

## 2025-03-26 RX ORDER — SILDENAFIL 100 MG/1
100 TABLET, FILM COATED ORAL DAILY PRN
Qty: 30 TABLET | Refills: 1 | Status: SHIPPED | OUTPATIENT
Start: 2025-03-26

## 2025-03-26 RX ORDER — TADALAFIL 20 MG/1
20 TABLET ORAL DAILY PRN
Qty: 15 TABLET | Refills: 5 | Status: SHIPPED | OUTPATIENT
Start: 2025-03-26 | End: 2025-03-26

## 2025-03-27 NOTE — PROGRESS NOTES
Follow Up Office Visit      Date of Visit:  2025   Patient Name: Ibrahima Martinez  : 1965   MRN: [id number]     Chief Complaint:    Chief Complaint   Patient presents with    Diabetes     Pt is here to go over blood work for diabetes        History of Present Illness:  The patient presents for evaluation of diabetes, blood pressure management, and medication management.     He has been informed of elevated A1c levels. He has been consuming a significant amount of sweet tea and Mountain Dew but has made efforts to reduce his sugar intake by limiting himself to one sugar in his morning coffee and substituting it with Sweet'N Low. He also uses a zero-calorie water enhancer containing sucralose. He has reduced his consumption of Mountain Dew from 3 or 4 cans to 1 can per day. He does not monitor his blood glucose levels at home. He reports no excessive thirst. His diet includes baked potatoes, and he had a less sweetened glass of sweet tea the previous night. He has a family history of diabetes in some aunts but not in his parents. He does not consume alcohol regularly, with his last intake being a gin and Sprite cocktail a month ago. He occasionally drinks beer during the summer when mowing the lawn.     He experienced bilateral leg edema 2 weeks ago, which was attributed to cardiac issues. He initiated furosemide treatment, resulting in frequent urination and subsequent resolution of the edema. He is currently on amlodipine 10 mg, benazepril 40 mg, and metoprolol for blood pressure management. His medication regimen was adjusted last month.     He is requesting refills for tadalafil and sildenafil.     He reports that Flomax causes nasal congestion and difficulty breathing, leading him to discontinue the medication. He has pre-existing respiratory issues. He has observed a change in his urine color from dark to light yellow, almost clear.     SOCIAL HISTORY  He does not drink alcohol regularly  anymore but had one mixed drink (gin and Sprite) in the last month.     FAMILY HISTORY  He reports no family history of diabetes in his parents, but mentions some aunts had bad diabetes. His father  of a heart attack.     MEDICATIONS  Current: amlodipine, benazepril, metoprolol, tadalafil, sildenafil, Flomax        Subjective      Review of Systems:   Review of Systems     Past Medical History:        Past Medical History:   Diagnosis Date    Abnormal ECG      Allergic     Asthma      Atrial fibrillation      BBB (bundle branch block)      Cataract 2019     Removed in 2019    Chronic kidney disease      Diabetes mellitus      GERD (gastroesophageal reflux disease)      Hyperlipidemia      Hypertension      Peripheral neuropathy      Visual impairment 2019         Past Surgical History:         Past Surgical History:   Procedure Laterality Date    BLADDER SURGERY         X3    CATARACT EXTRACTION         SECTION        KNEE SURGERY Right       9 knee surgeries    LASIK        TUBAL ABDOMINAL LIGATION         WITH LATER REVERSAL         Family History:         Family History   Problem Relation Age of Onset    Colon cancer Father      Cancer Father      Diabetes Mother      Hyperlipidemia Mother      Hypertension Mother      COPD Maternal Grandfather      COPD Maternal Grandmother      Hearing loss Maternal Grandmother      Depression Brother      Developmental Disability Son           Autism         Social History:   Social History            Socioeconomic History    Marital status:    Tobacco Use    Smoking status: Never    Smokeless tobacco: Never   Vaping Use    Vaping status: Never Used   Substance and Sexual Activity    Alcohol use: Never    Drug use: Never    Sexual activity: Yes       Partners: Male       Birth control/protection: Vasectomy         Medications:      Current Outpatient Medications:     albuterol sulfate  (90 Base) MCG/ACT inhaler, Inhale 2 puffs See Admin  "Instructions. Inhale 2 puffs by mouth every 4 to 6 hours as needed, Disp: 25.5 g, Rfl: 3    Cequa 0.09 % solution, , Disp: , Rfl:     cetirizine (zyrTEC) 10 MG tablet, Take 1 tablet by mouth Daily., Disp: 90 tablet, Rfl: 0    fluticasone (FLONASE) 50 MCG/ACT nasal spray, 2 sprays by Each Nare route Daily., Disp: 144 g, Rfl: 3    Miebo 1.338 GM/ML solution, INSTILL ONE DROP INTO BOTH EYES FOUR TIMES DAILY . BOTTLE PREPARATION IS REQUIRED. REFER TO PACKAGE INSERT FOR SPECIAL PREPARATION AND ADMIN, Disp: , Rfl:     montelukast (SINGULAIR) 10 MG tablet, Take 1 tablet by mouth Daily., Disp: 90 tablet, Rfl: 0    pantoprazole (PROTONIX) 40 MG EC tablet, TAKE 1 TABLET DAILY, Disp: 90 tablet, Rfl: 1    Semaglutide, 1 MG/DOSE, (Ozempic, 1 MG/DOSE,) 4 MG/3ML solution pen-injector, Inject 1 mg under the skin into the appropriate area as directed 1 (One) Time Per Week., Disp: 9 mL, Rfl: 1    traMADol (ULTRAM) 50 MG tablet, Take 1 tablet by mouth Every 6 (Six) Hours As Needed for Moderate Pain., Disp: 28 tablet, Rfl: 1    tretinoin (RETIN-A) 0.05 % cream, APPLY APPLY PEA-SIZED AMOUNT TO THE SKIN ONE TO TWO TIMES APPLY WEEK FOR ONE MONTH THEN APPLY 2-3 TIMES APPLY WEEK, Disp: , Rfl:      Allergies:         Allergies   Allergen Reactions    Gabapentin Swelling    Hydralazine Hives    Penicillins Rash and Unknown (See Comments)       FROM CHILDHOOD    Phenergan [Promethazine Hcl] Nausea And Vomiting    Prednisone Unknown (See Comments)       Eye problem and sugar problems         Objective      Physical Exam:  Vital Signs:       Vitals:     03/26/25 1440   BP: 120/80   Pulse: 101   Weight: 52.6 kg (116 lb)   Height: 163.8 cm (64.5\")      Body mass index is 19.6 kg/m².      Physical Exam  Physical Exam        Procedures     Results  Laboratory Studies  Sodium, potassium, chloride, calcium are normal. Kidney function test improved from 1.7 to 1.48. Liver function tests are normal. A1c is 12.4. Blood sugar is 385. Thyroid was normal. " Cholesterol numbers are fine. Blood counts are fine.  Assessment / Plan       Assessment/Plan:   Diagnoses and all orders for this visit:              Assessment & Plan  1. Diabetes Mellitus.  His A1c level has significantly increased from 6.4 to 12.4 over the past year, indicating poorly controlled diabetes. His blood glucose level was recorded at 309 today. He has been advised to monitor his blood glucose levels at home and make dietary modifications, including reducing the intake of sweet tea, soda, and starches. He has been prescribed glimepiride 1 mg to be taken once daily in the morning. The potential side effects of glimepiride, including hypoglycemia, have been discussed. If his blood sugar levels do not improve, insulin therapy may be considered.     2. Hypertension.  His blood pressure has shown improvement since the last visit, likely due to the diuretic medication. He is currently on amlodipine 10 mg, benazepril 40 mg, and metoprolol. He has been advised to continue with the current medication regimen and monitor his blood pressure regularly.     3. Medication Management.  He has requested refills for tadalafil and sildenafil. A prescription for tadalafil has been provided, and he has been advised to check the cost at his preferred pharmacy.     4. Benign Prostatic Hyperplasia.  He reports that Flomax (tamsulosin) causes nasal congestion and breathing difficulties. He has been advised to continue taking Flomax for now and reassess its necessity once his blood sugar levels are better controlled. If symptoms persist, alternative medications may be considered.     Follow-up  The patient will follow up in May 2025.           Follow Up:   No follow-ups on file.        Follow Up:   No follow-ups on file.    Chavo Duenas  [facility]     Patient or patient representative verbalized consent for the use of Ambient Listening during the visit with  Chavo Duenas MD for chart  documentation. 3/26/2025  21:09 EDT

## 2025-03-27 NOTE — PROGRESS NOTES
The patient presents for evaluation of diabetes, blood pressure management, and medication management.    He has been informed of elevated A1c levels. He has been consuming a significant amount of sweet tea and Mountain Dew but has made efforts to reduce his sugar intake by limiting himself to one sugar in his morning coffee and substituting it with Sweet'N Low. He also uses a zero-calorie water enhancer containing sucralose. He has reduced his consumption of Mountain Dew from 3 or 4 cans to 1 can per day. He does not monitor his blood glucose levels at home. He reports no excessive thirst. His diet includes baked potatoes, and he had a less sweetened glass of sweet tea the previous night. He has a family history of diabetes in some aunts but not in his parents. He does not consume alcohol regularly, with his last intake being a gin and Sprite cocktail a month ago. He occasionally drinks beer during the summer when mowing the lawn.    He experienced bilateral leg edema 2 weeks ago, which was attributed to cardiac issues. He initiated furosemide treatment, resulting in frequent urination and subsequent resolution of the edema. He is currently on amlodipine 10 mg, benazepril 40 mg, and metoprolol for blood pressure management. His medication regimen was adjusted last month.    He is requesting refills for tadalafil and sildenafil.    He reports that Flomax causes nasal congestion and difficulty breathing, leading him to discontinue the medication. He has pre-existing respiratory issues. He has observed a change in his urine color from dark to light yellow, almost clear.    SOCIAL HISTORY  He does not drink alcohol regularly anymore but had one mixed drink (gin and Sprite) in the last month.    FAMILY HISTORY  He reports no family history of diabetes in his parents, but mentions some aunts had bad diabetes. His father  of a heart attack.    MEDICATIONS  Current: amlodipine, benazepril, metoprolol, tadalafil,  sildenafil, Flomax      Subjective      Review of Systems:   Review of Systems    Past Medical History:   Past Medical History:   Diagnosis Date    Abnormal ECG     Allergic 2005    Asthma     Atrial fibrillation     BBB (bundle branch block)     Cataract 2019    Removed in 2019    Chronic kidney disease     Diabetes mellitus     GERD (gastroesophageal reflux disease)     Hyperlipidemia     Hypertension     Peripheral neuropathy     Visual impairment 2019       Past Surgical History:   Past Surgical History:   Procedure Laterality Date    BLADDER SURGERY      X3    CATARACT EXTRACTION       SECTION      KNEE SURGERY Right     9 knee surgeries    LASIK      TUBAL ABDOMINAL LIGATION      WITH LATER REVERSAL       Family History:   Family History   Problem Relation Age of Onset    Colon cancer Father     Cancer Father     Diabetes Mother     Hyperlipidemia Mother     Hypertension Mother     COPD Maternal Grandfather     COPD Maternal Grandmother     Hearing loss Maternal Grandmother     Depression Brother     Developmental Disability Son         Autism       Social History:   Social History     Socioeconomic History    Marital status:    Tobacco Use    Smoking status: Never    Smokeless tobacco: Never   Vaping Use    Vaping status: Never Used   Substance and Sexual Activity    Alcohol use: Never    Drug use: Never    Sexual activity: Yes     Partners: Male     Birth control/protection: Vasectomy       Medications:     Current Outpatient Medications:     albuterol sulfate  (90 Base) MCG/ACT inhaler, Inhale 2 puffs See Admin Instructions. Inhale 2 puffs by mouth every 4 to 6 hours as needed, Disp: 25.5 g, Rfl: 3    Cequa 0.09 % solution, , Disp: , Rfl:     cetirizine (zyrTEC) 10 MG tablet, Take 1 tablet by mouth Daily., Disp: 90 tablet, Rfl: 0    fluticasone (FLONASE) 50 MCG/ACT nasal spray, 2 sprays by Each Nare route Daily., Disp: 144 g, Rfl: 3    Miebo 1.338 GM/ML solution, INSTILL ONE DROP INTO  "BOTH EYES FOUR TIMES DAILY . BOTTLE PREPARATION IS REQUIRED. REFER TO PACKAGE INSERT FOR SPECIAL PREPARATION AND ADMIN, Disp: , Rfl:     montelukast (SINGULAIR) 10 MG tablet, Take 1 tablet by mouth Daily., Disp: 90 tablet, Rfl: 0    pantoprazole (PROTONIX) 40 MG EC tablet, TAKE 1 TABLET DAILY, Disp: 90 tablet, Rfl: 1    Semaglutide, 1 MG/DOSE, (Ozempic, 1 MG/DOSE,) 4 MG/3ML solution pen-injector, Inject 1 mg under the skin into the appropriate area as directed 1 (One) Time Per Week., Disp: 9 mL, Rfl: 1    traMADol (ULTRAM) 50 MG tablet, Take 1 tablet by mouth Every 6 (Six) Hours As Needed for Moderate Pain., Disp: 28 tablet, Rfl: 1    tretinoin (RETIN-A) 0.05 % cream, APPLY APPLY PEA-SIZED AMOUNT TO THE SKIN ONE TO TWO TIMES APPLY WEEK FOR ONE MONTH THEN APPLY 2-3 TIMES APPLY WEEK, Disp: , Rfl:     Allergies:   Allergies   Allergen Reactions    Gabapentin Swelling    Hydralazine Hives    Penicillins Rash and Unknown (See Comments)     FROM CHILDHOOD    Phenergan [Promethazine Hcl] Nausea And Vomiting    Prednisone Unknown (See Comments)     Eye problem and sugar problems       Objective     Physical Exam:  Vital Signs:   Vitals:    03/26/25 1440   BP: 120/80   Pulse: 101   Weight: 52.6 kg (116 lb)   Height: 163.8 cm (64.5\")     Body mass index is 19.6 kg/m².     Physical Exam  Physical Exam      Procedures    Results  Laboratory Studies  Sodium, potassium, chloride, calcium are normal. Kidney function test improved from 1.7 to 1.48. Liver function tests are normal. A1c is 12.4. Blood sugar is 385. Thyroid was normal. Cholesterol numbers are fine. Blood counts are fine.  Assessment / Plan      Assessment/Plan:   Diagnoses and all orders for this visit:           Assessment & Plan  1. Diabetes Mellitus.  His A1c level has significantly increased from 6.4 to 12.4 over the past year, indicating poorly controlled diabetes. His blood glucose level was recorded at 309 today. He has been advised to monitor his blood glucose " levels at home and make dietary modifications, including reducing the intake of sweet tea, soda, and starches. He has been prescribed glimepiride 1 mg to be taken once daily in the morning. The potential side effects of glimepiride, including hypoglycemia, have been discussed. If his blood sugar levels do not improve, insulin therapy may be considered.    2. Hypertension.  His blood pressure has shown improvement since the last visit, likely due to the diuretic medication. He is currently on amlodipine 10 mg, benazepril 40 mg, and metoprolol. He has been advised to continue with the current medication regimen and monitor his blood pressure regularly.    3. Medication Management.  He has requested refills for tadalafil and sildenafil. A prescription for tadalafil has been provided, and he has been advised to check the cost at his preferred pharmacy.    4. Benign Prostatic Hyperplasia.  He reports that Flomax (tamsulosin) causes nasal congestion and breathing difficulties. He has been advised to continue taking Flomax for now and reassess its necessity once his blood sugar levels are better controlled. If symptoms persist, alternative medications may be considered.    Follow-up  The patient will follow up in May 2025.        Follow Up:   No follow-ups on file.

## 2025-03-28 DIAGNOSIS — E11.65 TYPE 2 DIABETES MELLITUS WITH HYPERGLYCEMIA, WITHOUT LONG-TERM CURRENT USE OF INSULIN: ICD-10-CM

## 2025-03-28 RX ORDER — BLOOD SUGAR DIAGNOSTIC
STRIP MISCELLANEOUS
Qty: 100 EACH | Refills: 4 | Status: SHIPPED | OUTPATIENT
Start: 2025-03-28

## 2025-03-28 RX ORDER — BLOOD-GLUCOSE METER
EACH MISCELLANEOUS
Qty: 1 KIT | Refills: 0 | Status: SHIPPED | OUTPATIENT
Start: 2025-03-28

## 2025-04-16 ENCOUNTER — TELEPHONE (OUTPATIENT)
Dept: FAMILY MEDICINE CLINIC | Facility: CLINIC | Age: 60
End: 2025-04-16
Payer: COMMERCIAL

## 2025-04-16 NOTE — TELEPHONE ENCOUNTER
Incoming Refill Request      Medication requested (name and dose): hydrocodone     Pharmacy where request should be sent: [facility]    Additional details provided by patient: na    Best call back number: [phone]    Does the patient have less than a 3 day supply:  [x] Yes  [] No    [employer] Rep  04/16/25, 14:30 EDT

## 2025-04-18 DIAGNOSIS — G89.29 CHRONIC PAIN OF RIGHT KNEE: ICD-10-CM

## 2025-04-18 DIAGNOSIS — M25.561 CHRONIC PAIN OF RIGHT KNEE: ICD-10-CM

## 2025-04-18 RX ORDER — HYDROCODONE BITARTRATE AND ACETAMINOPHEN 7.5; 325 MG/1; MG/1
1 TABLET ORAL EVERY 6 HOURS PRN
Qty: 60 TABLET | Refills: 0 | Status: SHIPPED | OUTPATIENT
Start: 2025-04-18

## 2025-04-25 ENCOUNTER — OFFICE VISIT (OUTPATIENT)
Dept: FAMILY MEDICINE CLINIC | Facility: CLINIC | Age: 60
End: 2025-04-25
Payer: COMMERCIAL

## 2025-04-25 VITALS
BODY MASS INDEX: 30.92 KG/M2 | HEART RATE: 71 BPM | WEIGHT: 216 LBS | SYSTOLIC BLOOD PRESSURE: 160 MMHG | OXYGEN SATURATION: 98 % | HEIGHT: 70 IN | DIASTOLIC BLOOD PRESSURE: 84 MMHG

## 2025-04-25 DIAGNOSIS — R60.9 EDEMA, UNSPECIFIED TYPE: Primary | ICD-10-CM

## 2025-04-25 DIAGNOSIS — I10 PRIMARY HYPERTENSION: ICD-10-CM

## 2025-04-25 DIAGNOSIS — G89.29 CHRONIC PAIN OF BOTH SHOULDERS: ICD-10-CM

## 2025-04-25 DIAGNOSIS — E11.65 TYPE 2 DIABETES MELLITUS WITH HYPERGLYCEMIA, WITHOUT LONG-TERM CURRENT USE OF INSULIN: ICD-10-CM

## 2025-04-25 DIAGNOSIS — N40.1 BENIGN PROSTATIC HYPERPLASIA WITH NOCTURIA: ICD-10-CM

## 2025-04-25 DIAGNOSIS — G56.01 CARPAL TUNNEL SYNDROME OF RIGHT WRIST: ICD-10-CM

## 2025-04-25 DIAGNOSIS — M25.511 CHRONIC PAIN OF BOTH SHOULDERS: ICD-10-CM

## 2025-04-25 DIAGNOSIS — R35.1 BENIGN PROSTATIC HYPERPLASIA WITH NOCTURIA: ICD-10-CM

## 2025-04-25 DIAGNOSIS — M54.16 LUMBAR RADICULOPATHY: ICD-10-CM

## 2025-04-25 DIAGNOSIS — M25.512 CHRONIC PAIN OF BOTH SHOULDERS: ICD-10-CM

## 2025-04-25 RX ORDER — BUMETANIDE 1 MG/1
1 TABLET ORAL DAILY
Qty: 30 TABLET | Refills: 5 | Status: SHIPPED | OUTPATIENT
Start: 2025-04-25

## 2025-04-25 RX ORDER — POTASSIUM CHLORIDE 750 MG/1
10 TABLET, EXTENDED RELEASE ORAL DAILY
Qty: 30 TABLET | Refills: 5 | Status: SHIPPED | OUTPATIENT
Start: 2025-04-25

## 2025-04-25 NOTE — PROGRESS NOTES
Follow Up Office Visit      Date of Visit:  2025   Patient Name: Ibrahima Martinez  : 1965   MRN: [id number]     Chief Complaint:    Chief Complaint   Patient presents with    Leg Pain       History of Present Illness: Ibrahima Martinez is a 59 y.o. male who is here today for follow up.    History of Present Illness  The patient presents for evaluation of bilateral lower extremity edema, right hand pain, and medication management.    The chief complaint includes bilateral lower extremity edema and right hand pain. He has been monitoring his blood glucose levels at home, which have ranged from 64 to 178. A recent reading of 114 suggests that his current medication regimen is effective. Dietary modifications to reduce sugar intake have been made. A medication for elevated blood glucose levels was prescribed last month.    Bilateral lower extremity edema is reported, attributed to fluid retention. Self-medication with furosemide 40 mg, left over from a previous hospital admission for cardiac surgery, has been ongoing for the past 2 to 3 weeks. No potassium supplements are taken concurrently with the furosemide. No recent consultation with a cardiologist has occurred. Breathing difficulties when lying supine at night are not exacerbated. A history of consulting with Dr. May, a cardiologist, following cardiac surgery is noted, but no follow-up has occurred since Dr. May's departure.    Constant nasal congestion is reported, necessitating the use of a nasal spray every 1.5 to 2 hours, including during the night.    Severe burning pain in the right arm, extending to the fingertips, is reported, accompanied by numbness in the fingers. Difficulty with manual tasks, such as holding objects or working on a carburetor, is noted. A long-standing history of working as a  is mentioned.    Current medications include amlodipine and benazepril for blood pressure management. The dosage of benazepril was  increased from 20 mg to 40 mg a few months ago.    Tamsulosin is taken for prostate issues. He was advised to hold off on it to see how it was going to work. Previously, he was getting up 40 times during the night, which has significantly decreased now. He has not been taking it anymore.    PAST SURGICAL HISTORY:  Cardiac surgery: MM/YYYY      Subjective      Review of Systems:   Review of Systems    Past Medical History:   Past Medical History:   Diagnosis Date    Back problem     Diabetes     Heart disease     Hypertension        Past Surgical History:   Past Surgical History:   Procedure Laterality Date    CARDIAC CATHETERIZATION      CORONARY ARTERY BYPASS GRAFT  03/28/2022    [place]ima to LAD, reverse saphenous vein graft to 1st diagonal, reverse saphenous vein graft to ramus intermeduiusand reverse saphenous vein graft sequential to PDA then to first right posterior lateral branch artery, then to the second posterolateral artery (Dr Yogi Mcgregor    CORONARY STENT PLACEMENT      EPIDURAL BLOCK  2021    For right-sided sciatica pain.        Family History:   Family History   Problem Relation Age of Onset    Heart attack Father     Hypertension Father     Heart disease Father     Arthritis Mother        Social History:   Social History     Socioeconomic History    Marital status:    Tobacco Use    Smoking status: Some Days     Current packs/day: 1.00     Average packs/day: 1 pack/day for 15.0 years (15.0 ttl pk-yrs)     Types: Cigarettes     Passive exposure: Current    Smokeless tobacco: Never   Vaping Use    Vaping status: Never Used   Substance and Sexual Activity    Alcohol use: No    Drug use: Never    Sexual activity: Defer       Medications:     Current Outpatient Medications:     Acetaminophen Extra Strength 500 MG tablet, , Disp: , Rfl:     albuterol sulfate  (90 Base) MCG/ACT inhaler, INHALE 2 PUFFS BY MOUTH EVERY 4 (FOUR) HOURS AS NEEDED FOR WHEEZING., Disp: 8.5 g, Rfl: 1    amLODIPine  (NORVASC) 10 MG tablet, Take 1 tablet by mouth Daily., Disp: 90 tablet, Rfl: 1    aspirin 81 MG tablet, Take  by mouth Daily., Disp: , Rfl:     atorvastatin (LIPITOR) 80 MG tablet, Take 1 tablet by mouth Daily., Disp: 90 tablet, Rfl: 1    benazepril (LOTENSIN) 40 MG tablet, Take 1 tablet by mouth Daily., Disp: 90 tablet, Rfl: 1    Blood Glucose Monitoring Suppl (OneTouch Verio Flex System) w/Device kit, CHECK BLOOD SUGAR ONCE DAILY AS NEEDED, Disp: 1 kit, Rfl: 0    bumetanide (BUMEX) 1 MG tablet, Take 1 tablet by mouth Daily., Disp: 30 tablet, Rfl: 5    clopidogrel (PLAVIX) 75 MG tablet, Take 1 tablet by mouth Daily., Disp: 90 tablet, Rfl: 1    cyclobenzaprine (FLEXERIL) 10 MG tablet, Take 1 tablet by mouth 3 (Three) Times a Day As Needed for Muscle Spasms., Disp: 30 tablet, Rfl: 0    gabapentin (NEURONTIN) 600 MG tablet, Take 1 tablet by mouth 4 (Four) Times a Day., Disp: 120 tablet, Rfl: 3    glimepiride (Amaryl) 1 MG tablet, Take 1 tablet by mouth Every Morning Before Breakfast., Disp: 90 tablet, Rfl: 1    HYDROcodone-acetaminophen (NORCO) 7.5-325 MG per tablet, Take 1 tablet by mouth Every 6 (Six) Hours As Needed for Moderate Pain., Disp: 60 tablet, Rfl: 0    ibuprofen (ADVIL,MOTRIN) 800 MG tablet, , Disp: , Rfl:     Lancets misc, Use 1 Lancet Daily., Disp: 100 each, Rfl: 5    metoprolol tartrate (LOPRESSOR) 50 MG tablet, Take 1 tablet by mouth 2 (Two) Times a Day., Disp: 180 tablet, Rfl: 1    mometasone-formoterol (DULERA 100) 100-5 MCG/ACT inhaler, Inhale 2 puffs 2 (Two) Times a Day., Disp: 1 each, Rfl: 5    nitroglycerin (NITROSTAT) 0.4 MG SL tablet, , Disp: , Rfl:     ONETOUCH DELICA LANCETS 33G misc, , Disp: , Rfl:     OneTouch Verio test strip, CHECK BLOOD SUGAR ONCE DAILY AS DIRECTED, Disp: 100 each, Rfl: 4    Oxymetazoline HCl (NASAL SPRAY NA), into the nostril(s) as directed by provider., Disp: , Rfl:     potassium chloride 10 MEQ CR tablet, Take 1 tablet by mouth Daily., Disp: 30 tablet, Rfl: 5     "rOPINIRole (REQUIP) 2 MG tablet, Take 1 tablet by mouth Every Night. Take 1 hour before bedtime., Disp: 90 tablet, Rfl: 1    sildenafil (Viagra) 100 MG tablet, Take 1 tablet by mouth Daily As Needed for Erectile Dysfunction., Disp: 30 tablet, Rfl: 1    tadalafil (Cialis) 20 MG tablet, Take 1 tablet by mouth Daily As Needed for Erectile Dysfunction., Disp: 30 tablet, Rfl: 5    tamsulosin (FLOMAX) 0.4 MG capsule 24 hr capsule, Take 1 capsule by mouth Daily., Disp: 30 capsule, Rfl: 2    Testosterone Cypionate (DEPOTESTOTERONE CYPIONATE) 200 MG/ML injection, INJECT 1ML INTRAMUSCULAR EVERY 10 DAYS, Disp: 10 mL, Rfl: 0    Allergies:   No Known Allergies    Objective     Physical Exam:  Vital Signs:   Vitals:    04/25/25 1400   BP: 160/84   Pulse: 71   SpO2: 98%   Weight: 98 kg (216 lb)   Height: 177.8 cm (70\")     Body mass index is 30.99 kg/m².     Physical Exam  Vitals and nursing note reviewed.   Constitutional:       General: He is not in acute distress.     Appearance: Normal appearance. He is not ill-appearing.   HENT:      Head: Normocephalic and atraumatic.      Right Ear: Tympanic membrane and ear canal normal.      Left Ear: Tympanic membrane and ear canal normal.      Nose: Nose normal.   Cardiovascular:      Rate and Rhythm: Normal rate and regular rhythm.      Heart sounds: Normal heart sounds.   Pulmonary:      Effort: Pulmonary effort is normal.      Breath sounds: Normal breath sounds.   Musculoskeletal:      Right lower leg: Edema present.      Left lower leg: Edema present.   Neurological:      Mental Status: He is alert and oriented to person, place, and time. Mental status is at baseline.   Psychiatric:         Mood and Affect: Mood normal.       Physical Exam  Extremities: Bilateral edema noted    Procedures    Results  Labs   - Blood glucose test: 64 mg/dL, 114 mg/dL, 178 mg/dL   - Kidney function test: Normal  Assessment / Plan      Assessment/Plan:   Diagnoses and all orders for this visit:    1. " Edema, unspecified type (Primary)  -     Ambulatory Referral to Cardiology    2. Carpal tunnel syndrome of right wrist  -     Ambulatory Referral to Hand Surgery    3. Primary hypertension    4. Benign prostatic hyperplasia with nocturia    5. Lumbar radiculopathy    6. Chronic pain of both shoulders    7. Type 2 diabetes mellitus with hyperglycemia, without long-term current use of insulin    Other orders  -     potassium chloride 10 MEQ CR tablet; Take 1 tablet by mouth Daily.  Dispense: 30 tablet; Refill: 5  -     bumetanide (BUMEX) 1 MG tablet; Take 1 tablet by mouth Daily.  Dispense: 30 tablet; Refill: 5       Assessment & Plan  1. Bilateral lower extremity edema.  - Weight has increased by 8 pounds since 03/2025, suggesting fluid retention.  - Currently taking furosemide 40 mg daily for the past few weeks.  - Prescription for potassium will be provided to supplement diuretic therapy; diuretic will be switched from furosemide to Bumex 1 mg daily.  - Referral to cardiology for further evaluation, including an echocardiogram to assess cardiac function.  History of coronary artery disease.  Noncompliant with follow-up with cardiology.    2. Right hand pain.  - Symptoms suggest carpal tunnel syndrome, with potential peripheral neuropathy due to elevated blood glucose levels.  - Physical exam findings indicate numbness primarily in the right hand, affecting the median nerve distribution.  - Discussion included the possibility of a nerve conduction study to pinpoint the location of nerve compression.  - Referral to a hand specialist for further evaluation and potential corticosteroid injection.    3. Nasal congestion.  - Reports constant nasal congestion, requiring nasal spray every 1.5 to 2 hours, even at night.  - No changes to the current treatment plan were discussed.  - Review of records did not indicate any recent changes in medications that could be contributing to the congestion.  - No new medications or  therapies prescribed for nasal congestion.    4. Hypertension.  - Blood pressure has shown improvement, likely due to diuretic therapy.  - Currently on amlodipine and benazepril, with the latter increased from 20 mg to 40 mg a couple of months ago.  - No changes to the current antihypertensive regimen were discussed.  - Continued monitoring of blood pressure and effectiveness of current medications.    5. Medication management.  - Currently taking tamsulosin for prostate issues; advised to hold off on it to see how it was going to work.  - Significant decrease in nocturia, previously up 40 times during the night.  - Has not been taking tamsulosin anymore.  - Review of medication regimen to ensure no adverse interactions or side effects.    6. Diabetes mellitus.  - Reports stable blood sugar levels, with occasional lows (e.g., 64) and highs (e.g., 178).  - Current medication regimen appears effective in managing blood glucose levels.  - Advised to continue monitoring blood sugar levels regularly.  - No changes to diabetes management plan discussed.        Follow Up:   No follow-ups on file.    Chavo Duenas  [facility]     Patient or patient representative verbalized consent for the use of Ambient Listening during the visit with  Chavo Duenas MD for chart documentation. 4/26/2025  09:04 EDT

## 2025-04-28 ENCOUNTER — TELEPHONE (OUTPATIENT)
Age: 60
End: 2025-04-28

## 2025-04-28 DIAGNOSIS — M17.11 PRIMARY OSTEOARTHRITIS OF RIGHT KNEE: Primary | ICD-10-CM

## 2025-04-28 NOTE — TELEPHONE ENCOUNTER
PATIENT CALLED STATING HE WANTED TO GET ANOTHER ROUND OF GEL INJECTIONS FOR HIS RIGHT KNEE.  CAN A NEW ORDER BE PLACED FOR HIS RIGHT KNEE?  PLEASE ADVISE.  THANK YOU.

## 2025-05-09 ENCOUNTER — OFFICE VISIT (OUTPATIENT)
Dept: ORTHOPEDIC SURGERY | Facility: CLINIC | Age: 60
End: 2025-05-09
Payer: COMMERCIAL

## 2025-05-09 VITALS
SYSTOLIC BLOOD PRESSURE: 140 MMHG | HEIGHT: 70 IN | WEIGHT: 216 LBS | BODY MASS INDEX: 30.92 KG/M2 | DIASTOLIC BLOOD PRESSURE: 76 MMHG

## 2025-05-09 DIAGNOSIS — M77.11 LATERAL EPICONDYLITIS OF RIGHT ELBOW: ICD-10-CM

## 2025-05-09 DIAGNOSIS — G56.30 RADIAL TUNNEL SYNDROME: Primary | ICD-10-CM

## 2025-05-09 DIAGNOSIS — G56.01 CARPAL TUNNEL SYNDROME OF RIGHT WRIST: ICD-10-CM

## 2025-05-09 NOTE — PROGRESS NOTES
[facility]     Office Visit       Date: 05/09/2025   Patient Name: Ibrahima Martinez  MRN: [id number]  YOB: 1965    Referring Physician: Chavo Duenas MD     Chief Complaint:   Chief Complaint   Patient presents with   • Right Wrist - Pain       History of Present Illness:   Ibrahima Martinez is a 59 y.o. male presents with a 1 year history of right upper extremity pain numbness and tingling.  He reports numbness and tingling in his right thumb index and middle finger that is constant.  Reports symptoms occasionally worse at night.  He denies right hand weakness.  He does report right lateral elbow pain as well as right dorsal forearm pain that is worse with picking up heavy objects.  He also reports he occasionally gets burning sensation over the dorsal aspect of his right forearm and into his fingers.  He has no other relevant medical history.  He works as a .  He has tried anti-inflammatories.  No history of physical therapy for his right elbow hands.  He smokes 1/2 pack of cigarettes per day.      Subjective   Review of Systems:   Review of Systems   Constitutional:  Negative for chills, fever, unexpected weight gain and unexpected weight loss.   HENT:  Negative for congestion, postnasal drip and rhinorrhea.    Eyes:  Negative for blurred vision.   Respiratory:  Negative for shortness of breath.    Cardiovascular:  Negative for leg swelling.   Gastrointestinal:  Negative for abdominal pain, nausea and vomiting.   Genitourinary:  Negative for difficulty urinating.   Musculoskeletal:  Positive for arthralgias. Negative for gait problem, joint swelling and myalgias.   Skin:  Negative for skin lesions and wound.   Neurological:  Negative for dizziness, weakness, light-headedness and numbness.   Hematological:  Does not bruise/bleed easily.   Psychiatric/Behavioral:  Negative for depressed mood.         Pertinent  "review of systems per HPI.     I reviewed the patient's chief complaint, history of present illness, review of systems, past medical history, surgical history, family history, social history, medications and allergy list in the EMR on 05/09/2025 and agree with the findings above.    Objective    Vital Signs:   Vitals:    05/09/25 1222   BP: 140/76   Weight: 98 kg (216 lb)   Height: 177.8 cm (70\")     BMI: Body mass index is 30.99 kg/m².    General Appearance: No acute distress. Alert and oriented.     Chest:  Non-labored breathing on room air. Regular rate and rhythm.    Upper Extremity Exam:    Tender palpation of the right lateral epicondyle and radial tunnel.  Negative Tinel over the radial tunnel.  Increased pain with resisted extension of the wrist.  Increased pain with resisted supination of the forearm.  Negative Tinel at the right carpal tunnel.  Negative right carpal compression test.  Absent sensation in the median nerve distribution on the right.  No thenar wasting.  Negative Tinel at the right cubital tunnel    Fingers are warm, well-perfused with appropriate capillary refill.  Palpable radial pulse.    Sensation intact to light touch in  radial and ulnar nerve distributions.    Motor- Fires [employer], ulnar intrinsics, EPL/EDC w/ full active and passive range of motion. Strength intact.    Non-tender except for in the areas highlighted    Imaging/Studies:   Imaging Results (Last 24 Hours)       ** No results found for the last 24 hours. **            EMG nerve conduction studies from 2018 were independently reviewed and interpreted myself and demonstrate evidence of median neuropathy at the wrist and forearm    Procedures:  Procedures    Quality Measures:   ACP:   ACP discussion was deferred.    Tobacco:   Ibrahima Martinez  reports that he has been smoking cigarettes. He has a 15 pack-year smoking history. He has been exposed to tobacco smoke. He has never used smokeless tobacco.      Assessment / Plan  "   Assessment/Plan:     There are no diagnoses linked to this encounter.     Ibrahima Pereira a 59 y.o. male who presents with:      ICD-10-CM ICD-9-CM   1. Radial tunnel syndrome  G56.30 354.3   2. Carpal tunnel syndrome of right wrist  G56.01 354.0   3. Lateral epicondylitis of right elbow  M77.11 726.32         Patient presents with evidence of right hand numbness and tingling consistent with severe carpal tunnel syndrome.  We discussed the diagnosis of carpal tunnel syndrome as well as treatment options.  Recommend EMG and nerve conduction studies of the right upper extremity to evaluate the severity.    He also has evidence of right lateral epicondylitis and associated radial tunnel syndrome.  We discussed the diagnosis as well as further treatment options including physical therapy anti-inflammatories, bracing and possible surgery.  Recommend referral to physical therapy for multimodal treatment.  Recommend right forearm strap.  Recommend follow-up after EMG nerve study to discuss treatment options.    Follow Up:   Return for Follow-up after EMG/NCS.        Quinn Strong MD  [facility]

## 2025-05-13 ENCOUNTER — OFFICE VISIT (OUTPATIENT)
Dept: ORTHOPEDIC SURGERY | Facility: CLINIC | Age: 60
End: 2025-05-13
Payer: COMMERCIAL

## 2025-05-13 VITALS
DIASTOLIC BLOOD PRESSURE: 80 MMHG | SYSTOLIC BLOOD PRESSURE: 126 MMHG | HEIGHT: 70 IN | BODY MASS INDEX: 30.92 KG/M2 | WEIGHT: 216 LBS

## 2025-05-13 DIAGNOSIS — M25.551 RIGHT HIP PAIN: Primary | ICD-10-CM

## 2025-05-13 DIAGNOSIS — M16.11 PRIMARY OSTEOARTHRITIS OF RIGHT HIP: ICD-10-CM

## 2025-05-13 DIAGNOSIS — M17.11 PRIMARY OSTEOARTHRITIS OF RIGHT KNEE: ICD-10-CM

## 2025-05-13 NOTE — PROGRESS NOTES
[facility] Follow Up Visit     Office Follow Up      Date: 05/13/2025   Patient Name: Ibrahima Martinez  MRN: [id number]  YOB: 1965    Referring Physician: No ref. provider found     Chief Complaint:   Chief Complaint   Patient presents with    Follow-up     4 month follow up -- Primary osteoarthritis of right hip       History of Present Illness: Ibrahima Martinez is a 59 y.o. male who returns to clinic today for follow up on right hip pain. His pain is a 7 /10 on the pain scale. Patient has tried the following previous treatments cane and walker . He mentions current symptoms of pain . He states that these treatments have stayed the same.      Subjective     Review of Systems: Review of Systems   Constitutional:  Negative for chills, fever, unexpected weight gain and unexpected weight loss.   HENT:  Negative for congestion, postnasal drip and rhinorrhea.    Eyes:  Negative for blurred vision.   Respiratory:  Negative for shortness of breath.    Cardiovascular:  Negative for leg swelling.   Gastrointestinal:  Negative for abdominal pain, nausea and vomiting.   Genitourinary:  Negative for difficulty urinating.   Musculoskeletal:  Positive for arthralgias. Negative for gait problem, joint swelling and myalgias.   Skin:  Negative for skin lesions and wound.   Neurological:  Negative for dizziness, weakness, light-headedness and numbness.   Hematological:  Does not bruise/bleed easily.   Psychiatric/Behavioral:  Negative for depressed mood.         Medications:   Current Outpatient Medications:     Acetaminophen Extra Strength 500 MG tablet, , Disp: , Rfl:     albuterol sulfate  (90 Base) MCG/ACT inhaler, INHALE 2 PUFFS BY MOUTH EVERY 4 (FOUR) HOURS AS NEEDED FOR WHEEZING., Disp: 8.5 g, Rfl: 1    amLODIPine (NORVASC) 10 MG tablet, Take 1 tablet by mouth Daily., Disp: 90 tablet, Rfl: 1    aspirin 81 MG tablet, Take  by mouth Daily., Disp: ,  Rfl:     atorvastatin (LIPITOR) 80 MG tablet, Take 1 tablet by mouth Daily., Disp: 90 tablet, Rfl: 1    benazepril (LOTENSIN) 40 MG tablet, Take 1 tablet by mouth Daily., Disp: 90 tablet, Rfl: 1    Blood Glucose Monitoring Suppl (OneTouch Verio Flex System) w/Device kit, CHECK BLOOD SUGAR ONCE DAILY AS NEEDED, Disp: 1 kit, Rfl: 0    bumetanide (BUMEX) 1 MG tablet, Take 1 tablet by mouth Daily., Disp: 30 tablet, Rfl: 5    clopidogrel (PLAVIX) 75 MG tablet, Take 1 tablet by mouth Daily., Disp: 90 tablet, Rfl: 1    cyclobenzaprine (FLEXERIL) 10 MG tablet, Take 1 tablet by mouth 3 (Three) Times a Day As Needed for Muscle Spasms., Disp: 30 tablet, Rfl: 0    gabapentin (NEURONTIN) 600 MG tablet, Take 1 tablet by mouth 4 (Four) Times a Day., Disp: 120 tablet, Rfl: 3    glimepiride (Amaryl) 1 MG tablet, Take 1 tablet by mouth Every Morning Before Breakfast., Disp: 90 tablet, Rfl: 1    HYDROcodone-acetaminophen (NORCO) 7.5-325 MG per tablet, Take 1 tablet by mouth Every 6 (Six) Hours As Needed for Moderate Pain., Disp: 60 tablet, Rfl: 0    ibuprofen (ADVIL,MOTRIN) 800 MG tablet, , Disp: , Rfl:     Lancets misc, Use 1 Lancet Daily., Disp: 100 each, Rfl: 5    metoprolol tartrate (LOPRESSOR) 50 MG tablet, Take 1 tablet by mouth 2 (Two) Times a Day., Disp: 180 tablet, Rfl: 1    mometasone-formoterol (DULERA 100) 100-5 MCG/ACT inhaler, Inhale 2 puffs 2 (Two) Times a Day., Disp: 1 each, Rfl: 5    nitroglycerin (NITROSTAT) 0.4 MG SL tablet, , Disp: , Rfl:     ONETOUCH DELICA LANCETS 33G misc, , Disp: , Rfl:     OneTouch Verio test strip, CHECK BLOOD SUGAR ONCE DAILY AS DIRECTED, Disp: 100 each, Rfl: 4    Oxymetazoline HCl (NASAL SPRAY NA), into the nostril(s) as directed by provider., Disp: , Rfl:     potassium chloride 10 MEQ CR tablet, Take 1 tablet by mouth Daily., Disp: 30 tablet, Rfl: 5    rOPINIRole (REQUIP) 2 MG tablet, Take 1 tablet by mouth Every Night. Take 1 hour before bedtime., Disp: 90 tablet, Rfl: 1    sildenafil  "(Viagra) 100 MG tablet, Take 1 tablet by mouth Daily As Needed for Erectile Dysfunction., Disp: 30 tablet, Rfl: 1    tadalafil (Cialis) 20 MG tablet, Take 1 tablet by mouth Daily As Needed for Erectile Dysfunction., Disp: 30 tablet, Rfl: 5    tamsulosin (FLOMAX) 0.4 MG capsule 24 hr capsule, Take 1 capsule by mouth Daily., Disp: 30 capsule, Rfl: 2    Testosterone Cypionate (DEPOTESTOTERONE CYPIONATE) 200 MG/ML injection, INJECT 1ML INTRAMUSCULAR EVERY 10 DAYS, Disp: 10 mL, Rfl: 0    Allergies: No Known Allergies    I have reviewed and updated the patient's chief complaint, history of present illness, review of systems, past medical history, surgical history, family history, social history, medications and allergy list as appropriate.     Objective      Vital Signs:   Vitals:    05/13/25 1118   BP: 126/80   Weight: 98 kg (216 lb)   Height: 177.8 cm (70\")     Body mass index is 30.99 kg/m².  BMI is >= 30 and <35. (Class 1 Obesity). The following options were offered after discussion;: exercise counseling/recommendations and nutrition counseling/recommendations      In this visit the patient was advised to stop smoking and was offered tobacco cessation measures and resources, including NRT and/or medication intervention. At least 3 minutes was spent on face-to-face counseling regarding smoking cessation.    Ortho Exam:  Gait and Station: Appearance: antalgic gait.   Cardiovascular System: Arterial Pulses Right: dorsalis pedis pulse normal. Varicosities Right: capillary refill test normal.   Lumbar Spine: Inspection: normal alignment.   Hip/Pelvis Appearance: Inspection: normal axial alignment.   Hips: Bony Palpation Right: no tenderness of the greater trochanter. Soft Tissue Palpation Right: tenderness of the hip flexor muscles. Active Range of Motion Right: limited (secondary to pain especially flexion and rotation). Strength Right: normal 5/5.   Skin: Right Lower Extremity: normal. Left Lower Extremity: normal. "   Neurologic: Sensation on the Right: L1 normal, L2 normal, L3 normal, L4 normal, and S2 normal. Sensation on the Left: L1 normal, L2 normal, L3 normal, L4 normal, and S2 normal.  Right knee exam:   There is swelling and effusion but no warmth or erythema of the knee.    The skin is clear.    Overall the alignment of the knee is varus   The patient has 5/5 strength with ankle plantar flexion, dorsiflexion, inversion, eversion, and great toe extension.    Sensation is grossly present to light touch in the superficial peroneal, deep peroneal, and tibial nerve distributions.    The dorsalis pedis pulse is 2+ and the foot is well perfused with brisk capillary refill.   Active ROM is 0° to 110°.   Passive ROM is 0° to 110°.   The knee shows no gross instability to varus/valgus stress testing, anterior/posterior drawer sign, and Lachman testing.    There is tenderness to palpation over the medial/lateral joint line. Patellar grind test is positive.   Hip ROM is full and painless.    Results Review:   Imaging Results (Last 24 Hours)       Procedure Component Value Units Date/Time    XR Hip With or Without Pelvis 2 - 3 View Right [[id number]] Collected: 05/13/25 1124     Updated: 05/13/25 1129    Narrative:      XR HIP W OR WO PELVIS 2-3 VIEW RIGHT    Date of Exam: 5/13/2025 11:01 AM EDT    Indication: PAIN    Comparison: October 31, 2023    Findings:  No acute fracture is identified. The pelvic ring appears intact. There are moderate degenerative changes along both hip joints. There are mild degenerative changes along the sacroiliac joints and pubic symphysis. The soft tissues are unremarkable.      Impression:      Impression:  1.No acute osseous process identified.  2.Multifocal degenerative changes as described above.        Electronically Signed: Ravinder Dumont MD    5/13/2025 11:26 AM EDT    Workstation ID: [id number]        I personally reviewed and interpreted radiographs of the right hip from 5/13/2025.  No acute  fracture or dislocation.  Mild to moderate degenerative changes noted both in the hip joint as well as in the SI joint.    Procedures    Assessment / Plan      Assessment/Plan:   Diagnoses and all orders for this visit:    1. Right hip pain (Primary)  -     XR Hip With or Without Pelvis 2 - 3 View Right    2. Primary osteoarthritis of right knee  -     Large Joint Arthrocentesis; Future    3. Primary osteoarthritis of right hip    Plan:  Persistent anterior hip and groin pain responded well to prior corticosteroid injection to the hip joint.  Repeat radiographs today do show degenerative changes there and in the SI joint.  Also having significant pain in the knee.  Did not respond well to prior corticosteroid injection into the knee.  Recommend viscosupplementation injection.  Order placed today.  Follow-up to inject the right knee and right hip.    Follow Up:   No follow-ups on file.      Harshil Ag MD  [facility]

## 2025-05-14 DIAGNOSIS — G56.30 RADIAL TUNNEL SYNDROME: Primary | ICD-10-CM

## 2025-05-14 DIAGNOSIS — M77.11 LATERAL EPICONDYLITIS OF RIGHT ELBOW: ICD-10-CM

## 2025-05-16 DIAGNOSIS — M25.561 CHRONIC PAIN OF RIGHT KNEE: ICD-10-CM

## 2025-05-16 DIAGNOSIS — G89.29 CHRONIC PAIN OF RIGHT KNEE: ICD-10-CM

## 2025-05-19 RX ORDER — HYDROCODONE BITARTRATE AND ACETAMINOPHEN 7.5; 325 MG/1; MG/1
1 TABLET ORAL
Qty: 60 TABLET | Refills: 0 | Status: SHIPPED | OUTPATIENT
Start: 2025-05-19

## 2025-05-27 ENCOUNTER — TELEPHONE (OUTPATIENT)
Dept: ORTHOPEDIC SURGERY | Facility: CLINIC | Age: 60
End: 2025-05-27
Payer: COMMERCIAL

## 2025-05-27 ENCOUNTER — OFFICE VISIT (OUTPATIENT)
Dept: FAMILY MEDICINE CLINIC | Facility: CLINIC | Age: 60
End: 2025-05-27
Payer: COMMERCIAL

## 2025-05-27 VITALS
WEIGHT: 204.56 LBS | SYSTOLIC BLOOD PRESSURE: 138 MMHG | OXYGEN SATURATION: 97 % | DIASTOLIC BLOOD PRESSURE: 84 MMHG | HEART RATE: 65 BPM | BODY MASS INDEX: 29.35 KG/M2

## 2025-05-27 DIAGNOSIS — G89.29 CHRONIC PAIN OF RIGHT KNEE: ICD-10-CM

## 2025-05-27 DIAGNOSIS — R35.1 BENIGN PROSTATIC HYPERPLASIA WITH NOCTURIA: ICD-10-CM

## 2025-05-27 DIAGNOSIS — E11.65 TYPE 2 DIABETES MELLITUS WITH HYPERGLYCEMIA, WITHOUT LONG-TERM CURRENT USE OF INSULIN: Primary | ICD-10-CM

## 2025-05-27 DIAGNOSIS — I25.10 ATHEROSCLEROSIS OF CORONARY ARTERY OF NATIVE HEART WITHOUT ANGINA PECTORIS, UNSPECIFIED VESSEL OR LESION TYPE: ICD-10-CM

## 2025-05-27 DIAGNOSIS — I10 PRIMARY HYPERTENSION: ICD-10-CM

## 2025-05-27 DIAGNOSIS — M54.16 LUMBAR RADICULOPATHY: ICD-10-CM

## 2025-05-27 DIAGNOSIS — M25.561 CHRONIC PAIN OF RIGHT KNEE: ICD-10-CM

## 2025-05-27 DIAGNOSIS — N40.1 BENIGN PROSTATIC HYPERPLASIA WITH NOCTURIA: ICD-10-CM

## 2025-05-27 DIAGNOSIS — R60.9 EDEMA, UNSPECIFIED TYPE: ICD-10-CM

## 2025-05-27 PROCEDURE — 99214 OFFICE O/P EST MOD 30 MIN: CPT | Performed by: FAMILY MEDICINE

## 2025-05-27 PROCEDURE — 3075F SYST BP GE 130 - 139MM HG: CPT | Performed by: FAMILY MEDICINE

## 2025-05-27 PROCEDURE — 3079F DIAST BP 80-89 MM HG: CPT | Performed by: FAMILY MEDICINE

## 2025-05-27 PROCEDURE — 3046F HEMOGLOBIN A1C LEVEL >9.0%: CPT | Performed by: FAMILY MEDICINE

## 2025-05-27 RX ORDER — AMLODIPINE BESYLATE 10 MG/1
10 TABLET ORAL DAILY
Qty: 90 TABLET | Refills: 1 | Status: SHIPPED | OUTPATIENT
Start: 2025-05-27

## 2025-05-27 RX ORDER — ATORVASTATIN CALCIUM 80 MG/1
80 TABLET, FILM COATED ORAL DAILY
Qty: 90 TABLET | Refills: 1 | Status: SHIPPED | OUTPATIENT
Start: 2025-05-27

## 2025-05-27 RX ORDER — CLOPIDOGREL BISULFATE 75 MG/1
75 TABLET ORAL DAILY
Qty: 90 TABLET | Refills: 1 | Status: SHIPPED | OUTPATIENT
Start: 2025-05-27

## 2025-05-27 RX ORDER — METOPROLOL TARTRATE 50 MG
50 TABLET ORAL 2 TIMES DAILY
Qty: 180 TABLET | Refills: 1 | Status: SHIPPED | OUTPATIENT
Start: 2025-05-27

## 2025-05-27 NOTE — TELEPHONE ENCOUNTER
Caller: Ibrahima Martinez    Relationship to patient: Self    Best call back number: [phone] (home)     Patient is needing: PATIENT WOULD LIKE A CALL WITH AN UPDATE ON GETTING HIS INJECTION   REFERRAL WAS LOCKED   PLEASE ADVISE

## 2025-05-28 ENCOUNTER — CLINICAL SUPPORT (OUTPATIENT)
Age: 60
End: 2025-05-28
Payer: COMMERCIAL

## 2025-05-28 DIAGNOSIS — M17.11 PRIMARY OSTEOARTHRITIS OF RIGHT KNEE: ICD-10-CM

## 2025-05-28 DIAGNOSIS — M16.11 PRIMARY OSTEOARTHRITIS OF RIGHT HIP: Primary | ICD-10-CM

## 2025-05-28 LAB
ALBUMIN SERPL-MCNC: 4.6 G/DL (ref 3.9–4.9)
ALP SERPL-CCNC: 127 IU/L (ref 44–121)
ALT SERPL-CCNC: 14 IU/L (ref 0–44)
AST SERPL-CCNC: 21 IU/L (ref 0–40)
BILIRUB SERPL-MCNC: 0.6 MG/DL (ref 0–1.2)
BUN SERPL-MCNC: 24 MG/DL (ref 8–27)
BUN/CREAT SERPL: 14 (ref 10–24)
CALCIUM SERPL-MCNC: 9.5 MG/DL (ref 8.6–10.2)
CHLORIDE SERPL-SCNC: 103 MMOL/L (ref 96–106)
CO2 SERPL-SCNC: 25 MMOL/L (ref 20–29)
CREAT SERPL-MCNC: 1.69 MG/DL (ref 0.76–1.27)
EGFRCR SERPLBLD CKD-EPI 2021: 46 ML/MIN/1.73
GLOBULIN SER CALC-MCNC: 2.5 G/DL (ref 1.5–4.5)
GLUCOSE SERPL-MCNC: 99 MG/DL (ref 70–99)
HBA1C MFR BLD: 7 % (ref 4.8–5.6)
POTASSIUM SERPL-SCNC: 4.7 MMOL/L (ref 3.5–5.2)
PROT SERPL-MCNC: 7.1 G/DL (ref 6–8.5)
SODIUM SERPL-SCNC: 142 MMOL/L (ref 134–144)

## 2025-05-28 RX ORDER — LIDOCAINE HYDROCHLORIDE 10 MG/ML
2 INJECTION, SOLUTION EPIDURAL; INFILTRATION; INTRACAUDAL; PERINEURAL
Status: COMPLETED | OUTPATIENT
Start: 2025-05-28 | End: 2025-05-28

## 2025-05-28 RX ADMIN — LIDOCAINE HYDROCHLORIDE 2 ML: 10 INJECTION, SOLUTION EPIDURAL; INFILTRATION; INTRACAUDAL; PERINEURAL at 13:28

## 2025-05-28 NOTE — PROGRESS NOTES
Procedure   - Large Joint Arthrocentesis: R hip joint on 5/28/2025 1:35 PM  Indications: pain  Details: 21 G needle, ultrasound-guided anterior approach  Medications: 80 mg triamcinolone acetonide 40 MG/ML; 2 mL lidocaine PF 1% 1 %; 2 mL bupivacaine (PF) 0.5 %  Outcome: tolerated well, no immediate complications  Procedure, treatment alternatives, risks and benefits explained, specific risks discussed. Consent was given by the patient. Immediately prior to procedure a time out was called to verify the correct patient, procedure, equipment, support staff and site/side marked as required. Patient was prepped and draped in the usual sterile fashion.

## 2025-05-28 NOTE — PROGRESS NOTES
Follow Up Office Visit      Date of Visit:  2025   Patient Name: Ibrahima Martinez  : 1965   MRN: [id number]     Chief Complaint:    Chief Complaint   Patient presents with    Hypertension    Heartburn    Diabetes    Peripheral Neuropathy       History of Present Illness: Ibrahima Martinez is a 60 y.o. male who is here today for follow up.    History of Present Illness  The patient presents for a medication check.    He has been experiencing shoulder discomfort, which has shown improvement over the past 3 months. He had an appointment with Dr. Taurus Isaacs today to check on his shoulder, but he canceled it. He has an upcoming appointment with Dr. Ag, during which he plans to discuss the possibility of receiving injections in his knee and hip. He has been informed that the insurance company requires preauthorization for the knee injection, which has resulted in a delay of approximately 1.5 months. He has been advised that the injection can be administered tomorrow. He reports that the rooster comb injection has been more effective than previous treatments.    He has experienced unintentional weight loss of approximately 10 to 12 pounds, which he attributes to fluid retention. He has noticed some swelling in his legs, but it is not severe enough to cause him to lose his socks. He has not taken his medication today or yesterday due to being out of town but typically takes it daily. He has not yet received a call from cardiology.    He is uncertain about the need for tamsulosin, as he has recently refilled his prescription but has been advised against concurrent use with another medication. He occasionally uses Viagra in addition to Cialis. He takes ropinirole nightly for restless legs syndrome. He does not frequently use Flexeril. He has observed that his urine is clear and light yellow in color.      Subjective      Review of Systems:   Review of Systems    Past Medical History:   Past Medical  History:   Diagnosis Date    Back problem     Diabetes     Heart disease     Hypertension        Past Surgical History:   Past Surgical History:   Procedure Laterality Date    CARDIAC CATHETERIZATION      CORONARY ARTERY BYPASS GRAFT  03/28/2022    Lima to LAD, reverse saphenous vein graft to 1st diagonal, reverse saphenous vein graft to ramus intermeduiusand reverse saphenous vein graft sequential to PDA then to first right posterior lateral branch artery, then to the second posterolateral artery (Dr Yogi Mcgregor    CORONARY STENT PLACEMENT      EPIDURAL BLOCK  2021    For right-sided sciatica pain.        Family History:   Family History   Problem Relation Age of Onset    Heart attack Father     Hypertension Father     Heart disease Father     Arthritis Mother        Social History:   Social History     Socioeconomic History    Marital status:    Tobacco Use    Smoking status: Some Days     Current packs/day: 1.00     Average packs/day: 1 pack/day for 15.0 years (15.0 ttl pk-yrs)     Types: Cigarettes     Passive exposure: Current    Smokeless tobacco: Never   Vaping Use    Vaping status: Never Used   Substance and Sexual Activity    Alcohol use: No    Drug use: Never    Sexual activity: Defer       Medications:     Current Outpatient Medications:     Acetaminophen Extra Strength 500 MG tablet, , Disp: , Rfl:     albuterol sulfate  (90 Base) MCG/ACT inhaler, INHALE 2 PUFFS BY MOUTH EVERY 4 (FOUR) HOURS AS NEEDED FOR WHEEZING., Disp: 8.5 g, Rfl: 1    amLODIPine (NORVASC) 10 MG tablet, Take 1 tablet by mouth Daily., Disp: 90 tablet, Rfl: 1    aspirin 81 MG tablet, Take  by mouth Daily., Disp: , Rfl:     atorvastatin (LIPITOR) 80 MG tablet, Take 1 tablet by mouth Daily., Disp: 90 tablet, Rfl: 1    benazepril (LOTENSIN) 40 MG tablet, Take 1 tablet by mouth Daily., Disp: 90 tablet, Rfl: 1    Blood Glucose Monitoring Suppl (OneTouch Verio Flex System) w/Device kit, CHECK BLOOD SUGAR ONCE DAILY AS NEEDED,  Disp: 1 kit, Rfl: 0    bumetanide (BUMEX) 1 MG tablet, Take 1 tablet by mouth Daily., Disp: 30 tablet, Rfl: 5    clopidogrel (PLAVIX) 75 MG tablet, Take 1 tablet by mouth Daily., Disp: 90 tablet, Rfl: 1    cyclobenzaprine (FLEXERIL) 10 MG tablet, Take 1 tablet by mouth 3 (Three) Times a Day As Needed for Muscle Spasms., Disp: 30 tablet, Rfl: 0    gabapentin (NEURONTIN) 600 MG tablet, Take 1 tablet by mouth 4 (Four) Times a Day., Disp: 120 tablet, Rfl: 3    glimepiride (Amaryl) 1 MG tablet, Take 1 tablet by mouth Every Morning Before Breakfast., Disp: 90 tablet, Rfl: 1    HYDROcodone-acetaminophen (NORCO) 7.5-325 MG per tablet, TAKE 1 TABLET BY MOUTH EVERY SIX HOURS AS NEEDED FOR MODERATE PAIN, Disp: 60 tablet, Rfl: 0    ibuprofen (ADVIL,MOTRIN) 800 MG tablet, , Disp: , Rfl:     Lancets misc, Use 1 Lancet Daily., Disp: 100 each, Rfl: 5    metoprolol tartrate (LOPRESSOR) 50 MG tablet, Take 1 tablet by mouth 2 (Two) Times a Day., Disp: 180 tablet, Rfl: 1    mometasone-formoterol (DULERA 100) 100-5 MCG/ACT inhaler, Inhale 2 puffs 2 (Two) Times a Day., Disp: 1 each, Rfl: 5    nitroglycerin (NITROSTAT) 0.4 MG SL tablet, , Disp: , Rfl:     ONETOUCH DELICA LANCETS 33G misc, , Disp: , Rfl:     OneTouch Verio test strip, CHECK BLOOD SUGAR ONCE DAILY AS DIRECTED, Disp: 100 each, Rfl: 4    Oxymetazoline HCl (NASAL SPRAY NA), into the nostril(s) as directed by provider., Disp: , Rfl:     potassium chloride 10 MEQ CR tablet, Take 1 tablet by mouth Daily., Disp: 30 tablet, Rfl: 5    rOPINIRole (REQUIP) 2 MG tablet, Take 1 tablet by mouth Every Night. Take 1 hour before bedtime., Disp: 90 tablet, Rfl: 1    sildenafil (Viagra) 100 MG tablet, Take 1 tablet by mouth Daily As Needed for Erectile Dysfunction., Disp: 30 tablet, Rfl: 1    tadalafil (Cialis) 20 MG tablet, Take 1 tablet by mouth Daily As Needed for Erectile Dysfunction., Disp: 30 tablet, Rfl: 5    tamsulosin (FLOMAX) 0.4 MG capsule 24 hr capsule, Take 1 capsule by mouth  Daily., Disp: 30 capsule, Rfl: 2    Testosterone Cypionate (DEPOTESTOTERONE CYPIONATE) 200 MG/ML injection, INJECT 1ML INTRAMUSCULAR EVERY 10 DAYS, Disp: 10 mL, Rfl: 0    Allergies:   No Known Allergies    Objective     Physical Exam:  Vital Signs:   Vitals:    05/27/25 1517   BP: 138/84   Pulse: 65   SpO2: 97%   Weight: 92.8 kg (204 lb 9 oz)     Body mass index is 29.35 kg/m².     Physical Exam  Vitals and nursing note reviewed.   Constitutional:       General: He is not in acute distress.     Appearance: Normal appearance. He is not ill-appearing.   HENT:      Head: Normocephalic and atraumatic.      Right Ear: Tympanic membrane and ear canal normal.      Left Ear: Tympanic membrane and ear canal normal.      Nose: Nose normal.   Cardiovascular:      Rate and Rhythm: Normal rate and regular rhythm.      Heart sounds: Normal heart sounds.   Pulmonary:      Effort: Pulmonary effort is normal.      Breath sounds: Normal breath sounds.   Neurological:      Mental Status: He is alert and oriented to person, place, and time. Mental status is at baseline.   Psychiatric:         Mood and Affect: Mood normal.       Physical Exam  Extremities: No edema, no cyanosis    Procedures    Results    Assessment / Plan      Assessment/Plan:   Diagnoses and all orders for this visit:    1. Type 2 diabetes mellitus with hyperglycemia, without long-term current use of insulin (Primary)  -     Hemoglobin A1c    2. Primary hypertension  -     metoprolol tartrate (LOPRESSOR) 50 MG tablet; Take 1 tablet by mouth 2 (Two) Times a Day.  Dispense: 180 tablet; Refill: 1  -     clopidogrel (PLAVIX) 75 MG tablet; Take 1 tablet by mouth Daily.  Dispense: 90 tablet; Refill: 1  -     atorvastatin (LIPITOR) 80 MG tablet; Take 1 tablet by mouth Daily.  Dispense: 90 tablet; Refill: 1  -     Comprehensive Metabolic Panel    3. Atherosclerosis of coronary artery of native heart without angina pectoris, unspecified vessel or lesion type  -     metoprolol  tartrate (LOPRESSOR) 50 MG tablet; Take 1 tablet by mouth 2 (Two) Times a Day.  Dispense: 180 tablet; Refill: 1  -     clopidogrel (PLAVIX) 75 MG tablet; Take 1 tablet by mouth Daily.  Dispense: 90 tablet; Refill: 1  -     atorvastatin (LIPITOR) 80 MG tablet; Take 1 tablet by mouth Daily.  Dispense: 90 tablet; Refill: 1    4. Edema, unspecified type    5. Chronic pain of right knee    6. Benign prostatic hyperplasia with nocturia    7. Lumbar radiculopathy    Other orders  -     amLODIPine (NORVASC) 10 MG tablet; Take 1 tablet by mouth Daily.  Dispense: 90 tablet; Refill: 1       Assessment & Plan  1. Medication management.  - Blood pressure and oxygen levels are within normal range, and there is a significant improvement in edema.  - Kidney function, previously suboptimal in 09/2024, has shown improvement. A1c levels were elevated in 02/2025.  - Advised to continue current regimen of Cialis, potassium, Dulera, bumetanide, gabapentin, tamsulosin, Viagra, ropinirole, metoprolol, glimepiride, Plavix, benazepril, Lipitor, amlodipine, hydrocodone, and Flexeril. Potassium supplement to be taken concurrently with the diuretic.  - Diuretic to be taken daily initially, then on an as-needed basis once swelling subsides. Tamsulosin to be taken concurrently with the diuretic. Kidney function and A1c levels to be rechecked today.        Follow Up:   No follow-ups on file.    Chavo Duenas  [facility]     Patient or patient representative verbalized consent for the use of Ambient Listening during the visit with  Chavo Duenas MD for chart documentation. 5/27/2025  21:05 EDT

## 2025-05-28 NOTE — PROGRESS NOTES
Procedure   - Large Joint Arthrocentesis: R knee on 5/28/2025 1:28 PM  Indications: joint swelling  Details: 21 G needle, ultrasound-guided anterolateral approach  Medications: 30 mg Cross-Linked Hyaluronate 30 MG/3ML; 2 mL lidocaine PF 1% 1 %  Outcome: tolerated well, no immediate complications  Procedure, treatment alternatives, risks and benefits explained, specific risks discussed. Consent was given by the patient. Immediately prior to procedure a time out was called to verify the correct patient, procedure, equipment, support staff and site/side marked as required. Patient was prepped and draped in the usual sterile fashion.            60-year-old male with right knee pain from right knee osteoarthritis.  Here today for ultrasound-guided Gel one injection.  Ultrasound guidance used for proper needle placement.  Procedure was explained in detail prior to starting.  All questions answered to the best of my ability.  Procedure was completed without complication.  See procedure note above.  He will follow up as symptoms dictate.

## 2025-05-29 DIAGNOSIS — G56.30 RADIAL TUNNEL SYNDROME: ICD-10-CM

## 2025-05-29 DIAGNOSIS — G56.01 CARPAL TUNNEL SYNDROME OF RIGHT WRIST: ICD-10-CM

## 2025-06-03 ENCOUNTER — CLINICAL SUPPORT (OUTPATIENT)
Dept: ORTHOPEDIC SURGERY | Facility: CLINIC | Age: 60
End: 2025-06-03
Payer: COMMERCIAL

## 2025-06-03 DIAGNOSIS — M16.11 PRIMARY OSTEOARTHRITIS OF RIGHT HIP: Primary | ICD-10-CM

## 2025-06-03 RX ORDER — LIDOCAINE HYDROCHLORIDE 10 MG/ML
2 INJECTION, SOLUTION EPIDURAL; INFILTRATION; INTRACAUDAL; PERINEURAL
Status: COMPLETED | OUTPATIENT
Start: 2025-06-03 | End: 2025-06-03

## 2025-06-03 RX ORDER — BUPIVACAINE HYDROCHLORIDE 2.5 MG/ML
2 INJECTION, SOLUTION EPIDURAL; INFILTRATION; INTRACAUDAL; PERINEURAL
Status: COMPLETED | OUTPATIENT
Start: 2025-06-03 | End: 2025-06-03

## 2025-06-03 RX ORDER — TRIAMCINOLONE ACETONIDE 40 MG/ML
80 INJECTION, SUSPENSION INTRA-ARTICULAR; INTRAMUSCULAR
Status: COMPLETED | OUTPATIENT
Start: 2025-06-03 | End: 2025-06-03

## 2025-06-03 RX ADMIN — TRIAMCINOLONE ACETONIDE 80 MG: 40 INJECTION, SUSPENSION INTRA-ARTICULAR; INTRAMUSCULAR at 13:10

## 2025-06-03 RX ADMIN — LIDOCAINE HYDROCHLORIDE 2 ML: 10 INJECTION, SOLUTION EPIDURAL; INFILTRATION; INTRACAUDAL; PERINEURAL at 13:10

## 2025-06-03 RX ADMIN — BUPIVACAINE HYDROCHLORIDE 2 ML: 2.5 INJECTION, SOLUTION EPIDURAL; INFILTRATION; INTRACAUDAL; PERINEURAL at 13:10

## 2025-06-03 NOTE — PROGRESS NOTES
Procedure   - Large Joint Arthrocentesis: R hip joint on 6/3/2025 1:10 PM  Indications: pain  Details: 21 G needle, ultrasound-guided anterior approach  Medications: 2 mL bupivacaine (PF) 0.25 %; 2 mL lidocaine PF 1% 1 %; 80 mg triamcinolone acetonide 40 MG/ML  Outcome: tolerated well, no immediate complications  Procedure, treatment alternatives, risks and benefits explained, specific risks discussed. Consent was given by the patient. Immediately prior to procedure a time out was called to verify the correct patient, procedure, equipment, support staff and site/side marked as required. Patient was prepped and draped in the usual sterile fashion.        60-year-old male presents with right hip pain from right hip osteoarthritis.  Patient is here for ultrasound-guided right hip corticosteroid injection.  Previous office documentation and images were personally reviewed prior to the visit.  We discussed them in detail how they correlate to experienced symptoms.  I explained the procedure in detail.  I answered all questions to the best my ability.  Risks and benefits as well as post procedure instructions were provided.  Patient elected to proceed and tolerated this procedure well.  See procedure note.  He also would like to try viscosupplementation injections under ultrasound guidance into the right hip joint.  Order placed today.  Follow-up with me will be for injection.

## 2025-06-11 DIAGNOSIS — E29.1 MALE HYPOGONADISM: ICD-10-CM

## 2025-06-11 RX ORDER — TESTOSTERONE CYPIONATE 200 MG/ML
INJECTION, SOLUTION INTRAMUSCULAR
Qty: 10 ML | Refills: 0 | Status: SHIPPED | OUTPATIENT
Start: 2025-06-11

## 2025-06-16 DIAGNOSIS — M25.561 CHRONIC PAIN OF RIGHT KNEE: ICD-10-CM

## 2025-06-16 DIAGNOSIS — G89.29 CHRONIC PAIN OF RIGHT KNEE: ICD-10-CM

## 2025-06-16 RX ORDER — HYDROCODONE BITARTRATE AND ACETAMINOPHEN 7.5; 325 MG/1; MG/1
1 TABLET ORAL
Qty: 60 TABLET | Refills: 0 | Status: SHIPPED | OUTPATIENT
Start: 2025-06-16

## 2025-06-16 NOTE — TELEPHONE ENCOUNTER
NEEDS THIS CALLED IN TODAY DUE TO GOING OUT OF TOWN TOMORROW. IT WOULD BE ONE DAY EARLY CAN WE PLEASE DO THIS CALL PATIENT TO LET HIM KNOW EITHER WAY       Caller: Ibrahima Martinez    Relationship: Self    Best call back number: [phone]     Requested Prescriptions:     NEEDS THIS CALLED IN TODAY DUE TO GOING OUT OF TOWN TOMORROW. IT WOULD BE ONE DAY EARLY CAN WE PLEASE DO THIS CALL PATIENT TO LET HIM KNOW EITHER WAY       Requested Prescriptions     Pending Prescriptions Disp Refills    HYDROcodone-acetaminophen (NORCO) 7.5-325 MG per tablet 60 tablet 0     Sig: Take 1 tablet by mouth every 6 (six) to 8 (eight) hours as needed for Moderate Pain.        Pharmacy where request should be sent: [facility] [address] [phone] [facility] [phone]      Last office visit with prescribing clinician: 5/27/2025   Last telemedicine visit with prescribing clinician: Visit date not found   Next office visit with prescribing clinician: 6/25/2025     Additional details provided by patient: NEEDS THIS CALLED IN TODAY DUE TO GOING OUT OF TOWN TOMORROW. IT WOULD BE ONE DAY EARLY CAN WE PLEASE DO THIS CALL PATIENT TO LET HIM KNOW EITHER WAY     Does the patient have less than a 3 day supply:  [x] Yes  [] No    Would you like a call back once the refill request has been completed: [] Yes [x] No    If the office needs to give you a call back, can they leave a voicemail: [] Yes [x] No    King Damon   06/16/25 12:43 EDT

## 2025-06-25 ENCOUNTER — OFFICE VISIT (OUTPATIENT)
Dept: FAMILY MEDICINE CLINIC | Facility: CLINIC | Age: 60
End: 2025-06-25
Payer: COMMERCIAL

## 2025-06-25 VITALS
SYSTOLIC BLOOD PRESSURE: 160 MMHG | BODY MASS INDEX: 29.35 KG/M2 | DIASTOLIC BLOOD PRESSURE: 72 MMHG | WEIGHT: 205 LBS | OXYGEN SATURATION: 98 % | HEART RATE: 80 BPM | HEIGHT: 70 IN

## 2025-06-25 DIAGNOSIS — M54.16 LUMBAR RADICULOPATHY: ICD-10-CM

## 2025-06-25 DIAGNOSIS — M25.561 CHRONIC PAIN OF BOTH KNEES: ICD-10-CM

## 2025-06-25 DIAGNOSIS — E11.65 TYPE 2 DIABETES MELLITUS WITH HYPERGLYCEMIA, WITHOUT LONG-TERM CURRENT USE OF INSULIN: ICD-10-CM

## 2025-06-25 DIAGNOSIS — G89.29 CHRONIC PAIN OF BOTH KNEES: ICD-10-CM

## 2025-06-25 DIAGNOSIS — M25.562 CHRONIC PAIN OF BOTH KNEES: ICD-10-CM

## 2025-06-25 PROCEDURE — 99214 OFFICE O/P EST MOD 30 MIN: CPT | Performed by: FAMILY MEDICINE

## 2025-06-25 PROCEDURE — 3077F SYST BP >= 140 MM HG: CPT | Performed by: FAMILY MEDICINE

## 2025-06-25 PROCEDURE — 3051F HG A1C>EQUAL 7.0%<8.0%: CPT | Performed by: FAMILY MEDICINE

## 2025-06-25 PROCEDURE — 3078F DIAST BP <80 MM HG: CPT | Performed by: FAMILY MEDICINE

## 2025-06-25 RX ORDER — GABAPENTIN 600 MG/1
600 TABLET ORAL 4 TIMES DAILY
Qty: 120 TABLET | Refills: 3 | Status: SHIPPED | OUTPATIENT
Start: 2025-06-25

## 2025-06-26 NOTE — PROGRESS NOTES
Follow Up Office Visit      Date of Visit:  2025   Patient Name: Ibrahima Martinez  : 1965   MRN: [id number]     Chief Complaint:    Chief Complaint   Patient presents with    Diabetes       History of Present Illness: Ibrahima Martinez is a 60 y.o. male who is here today for follow up.    History of Present Illness  The patient presents for evaluation of diabetes, kidney function, and medication management.    He has been experiencing edema in the past few days, which he managed by taking his diuretic medication. However, he did not take the medication today as the swelling was not severe.    His current medication regimen includes glimepiride for blood glucose control. He has made dietary modifications, including reducing his intake of sweet tea and soda, which has resulted in a significant improvement in his blood glucose levels. He is also on metoprolol and Plavix.    He is seeking a prescription for syringes and needles for his testosterone therapy. He uses a 3 mL syringe with a 21 or 22-gauge needle for drawing the medication and a 25-gauge needle for administration. The length of the needle is approximately 1.5 inches for the 21 or 22-gauge and 1 inch for the 25-gauge.    He has been informed by his insurance provider that they will no longer cover the cost of OneTouch glucometer at the end of this month, necessitating a switch to Freestyle or another brand.    He is currently not utilizing tamsulosin due to its impact on his blood glucose levels.      Subjective      Review of Systems:   Review of Systems    Past Medical History:   Past Medical History:   Diagnosis Date    Back problem     Diabetes     Heart disease     Hypertension        Past Surgical History:   Past Surgical History:   Procedure Laterality Date    CARDIAC CATHETERIZATION      CORONARY ARTERY BYPASS GRAFT  2022    [place] to LAD, reverse saphenous vein graft to 1st diagonal, reverse saphenous vein graft to ramus  intermeduiusand reverse saphenous vein graft sequential to PDA then to first right posterior lateral branch artery, then to the second posterolateral artery (Dr Yogi Mcgregor    CORONARY STENT PLACEMENT      EPIDURAL BLOCK  2021    For right-sided sciatica pain.        Family History:   Family History   Problem Relation Age of Onset    Heart attack Father     Hypertension Father     Heart disease Father     Arthritis Mother        Social History:   Social History     Socioeconomic History    Marital status:    Tobacco Use    Smoking status: Some Days     Current packs/day: 1.00     Average packs/day: 1 pack/day for 15.0 years (15.0 ttl pk-yrs)     Types: Cigarettes     Passive exposure: Current    Smokeless tobacco: Never   Vaping Use    Vaping status: Never Used   Substance and Sexual Activity    Alcohol use: No    Drug use: Never    Sexual activity: Defer       Medications:     Current Outpatient Medications:     gabapentin (NEURONTIN) 600 MG tablet, Take 1 tablet by mouth 4 (Four) Times a Day., Disp: 120 tablet, Rfl: 3    Acetaminophen Extra Strength 500 MG tablet, , Disp: , Rfl:     albuterol sulfate  (90 Base) MCG/ACT inhaler, INHALE 2 PUFFS BY MOUTH EVERY 4 (FOUR) HOURS AS NEEDED FOR WHEEZING., Disp: 8.5 g, Rfl: 1    amLODIPine (NORVASC) 10 MG tablet, Take 1 tablet by mouth Daily., Disp: 90 tablet, Rfl: 1    aspirin 81 MG tablet, Take  by mouth Daily., Disp: , Rfl:     atorvastatin (LIPITOR) 80 MG tablet, Take 1 tablet by mouth Daily., Disp: 90 tablet, Rfl: 1    benazepril (LOTENSIN) 40 MG tablet, Take 1 tablet by mouth Daily., Disp: 90 tablet, Rfl: 1    Blood Glucose Monitoring Suppl (OneTouch Verio Flex System) w/Device kit, CHECK BLOOD SUGAR ONCE DAILY AS NEEDED, Disp: 1 kit, Rfl: 0    bumetanide (BUMEX) 1 MG tablet, Take 1 tablet by mouth Daily., Disp: 30 tablet, Rfl: 5    clopidogrel (PLAVIX) 75 MG tablet, Take 1 tablet by mouth Daily., Disp: 90 tablet, Rfl: 1    cyclobenzaprine (FLEXERIL) 10  MG tablet, Take 1 tablet by mouth 3 (Three) Times a Day As Needed for Muscle Spasms., Disp: 30 tablet, Rfl: 0    glimepiride (Amaryl) 1 MG tablet, Take 1 tablet by mouth Every Morning Before Breakfast., Disp: 90 tablet, Rfl: 1    HYDROcodone-acetaminophen (NORCO) 7.5-325 MG per tablet, Take 1 tablet by mouth every 6 (six) to 8 (eight) hours as needed for Moderate Pain., Disp: 60 tablet, Rfl: 0    ibuprofen (ADVIL,MOTRIN) 800 MG tablet, , Disp: , Rfl:     Lancets misc, Use 1 Lancet Daily., Disp: 100 each, Rfl: 5    metoprolol tartrate (LOPRESSOR) 50 MG tablet, Take 1 tablet by mouth 2 (Two) Times a Day., Disp: 180 tablet, Rfl: 1    mometasone-formoterol (DULERA 100) 100-5 MCG/ACT inhaler, Inhale 2 puffs 2 (Two) Times a Day., Disp: 1 each, Rfl: 5    nitroglycerin (NITROSTAT) 0.4 MG SL tablet, , Disp: , Rfl:     ONETOUCH DELICA LANCETS 33G misc, , Disp: , Rfl:     OneTouch Verio test strip, CHECK BLOOD SUGAR ONCE DAILY AS DIRECTED, Disp: 100 each, Rfl: 4    Oxymetazoline HCl (NASAL SPRAY NA), into the nostril(s) as directed by provider., Disp: , Rfl:     potassium chloride 10 MEQ CR tablet, Take 1 tablet by mouth Daily., Disp: 30 tablet, Rfl: 5    rOPINIRole (REQUIP) 2 MG tablet, Take 1 tablet by mouth Every Night. Take 1 hour before bedtime., Disp: 90 tablet, Rfl: 1    sildenafil (Viagra) 100 MG tablet, Take 1 tablet by mouth Daily As Needed for Erectile Dysfunction., Disp: 30 tablet, Rfl: 1    tadalafil (Cialis) 20 MG tablet, Take 1 tablet by mouth Daily As Needed for Erectile Dysfunction., Disp: 30 tablet, Rfl: 5    tamsulosin (FLOMAX) 0.4 MG capsule 24 hr capsule, Take 1 capsule by mouth Daily., Disp: 30 capsule, Rfl: 2    Testosterone Cypionate (DEPOTESTOTERONE CYPIONATE) 200 MG/ML injection, INJECT 1ML BY INTRAMUSCULAR ROUTE EVERY 10 DAYS, Disp: 10 mL, Rfl: 0    Allergies:   No Known Allergies    Objective     Physical Exam:  Vital Signs:   Vitals:    06/25/25 1348   BP: 160/72   Pulse: 80   SpO2: 98%   Weight:  "93 kg (205 lb)   Height: 177.8 cm (70\")     Body mass index is 29.41 kg/m².     Physical Exam  Vitals and nursing note reviewed.   Constitutional:       General: He is not in acute distress.     Appearance: Normal appearance. He is not ill-appearing.   HENT:      Head: Normocephalic and atraumatic.      Right Ear: Tympanic membrane and ear canal normal.      Left Ear: Tympanic membrane and ear canal normal.      Nose: Nose normal.   Cardiovascular:      Rate and Rhythm: Normal rate and regular rhythm.      Heart sounds: Normal heart sounds.   Pulmonary:      Effort: Pulmonary effort is normal.      Breath sounds: Normal breath sounds.   Neurological:      Mental Status: He is alert and oriented to person, place, and time. Mental status is at baseline.   Psychiatric:         Mood and Affect: Mood normal.       Physical Exam  Extremities: No swelling noted today.    Procedures    Results  Labs   - Electrolytes (Sodium, Potassium, Chloride, Calcium): Normal   - Kidney Function Tests: Improved but not yet optimal   - Liver Function Tests: Normal   - Blood Glucose Test: Average blood sugar around 150-160 mg/dL  Assessment / Plan      Assessment/Plan:   Diagnoses and all orders for this visit:    1. Type 2 diabetes mellitus with hyperglycemia, without long-term current use of insulin  -     gabapentin (NEURONTIN) 600 MG tablet; Take 1 tablet by mouth 4 (Four) Times a Day.  Dispense: 120 tablet; Refill: 3    2. Chronic pain of both knees  -     gabapentin (NEURONTIN) 600 MG tablet; Take 1 tablet by mouth 4 (Four) Times a Day.  Dispense: 120 tablet; Refill: 3    3. Lumbar radiculopathy  -     gabapentin (NEURONTIN) 600 MG tablet; Take 1 tablet by mouth 4 (Four) Times a Day.  Dispense: 120 tablet; Refill: 3       Assessment & Plan  1. Renal function.  - Renal function has shown improvement since hospitalization but remains suboptimal.  - Electrolytes are within normal limits; kidney function tests indicate some " fluctuations.  - Advised to maintain adequate hydration and continue diuretic medication as needed for edema.  - Overuse of diuretics could potentially exacerbate renal function.    2. Diabetes Mellitus.  - Average blood glucose level has significantly improved from approximately 350 to around 150-160.  - Improvement attributed to dietary modifications and adherence to glimepiride regimen.  - Liver function tests are normal.  - Advised to continue current medication regimen without any changes.    3. Medication Management.  - Advised to continue current regimen of Dulera and gabapentin.  - Prescription for glucometer, test strips, and lancets will be provided.  - Prescription for syringes and needles for testosterone therapy will be issued.  - Gabapentin prescription will be renewed.    4. Prostate issues.  - Currently not utilizing tamsulosin due to its impact on blood glucose levels.  - Metoprolol and Plavix prescriptions are adequate for the next few months.  - Follow-up in 2 months for pain medication management and other prescriptions.        Follow Up:   No follow-ups on file.    Chavo Duenas  [facility] [place]     Patient or patient representative verbalized consent for the use of Ambient Listening during the visit with  Chavo Duenas MD for chart documentation. 6/26/2025  12:44 EDT

## 2025-08-19 ENCOUNTER — OFFICE VISIT (OUTPATIENT)
Dept: ORTHOPEDIC SURGERY | Facility: CLINIC | Age: 60
End: 2025-08-19
Payer: COMMERCIAL

## 2025-08-19 VITALS
SYSTOLIC BLOOD PRESSURE: 140 MMHG | WEIGHT: 205 LBS | DIASTOLIC BLOOD PRESSURE: 80 MMHG | BODY MASS INDEX: 29.35 KG/M2 | HEIGHT: 70 IN

## 2025-08-19 DIAGNOSIS — M25.462 EFFUSION OF LEFT KNEE: ICD-10-CM

## 2025-08-19 DIAGNOSIS — M17.0 BILATERAL PRIMARY OSTEOARTHRITIS OF KNEE: Primary | ICD-10-CM

## 2025-08-19 RX ORDER — TRIAMCINOLONE ACETONIDE 40 MG/ML
80 INJECTION, SUSPENSION INTRA-ARTICULAR; INTRAMUSCULAR
Status: COMPLETED | OUTPATIENT
Start: 2025-08-19 | End: 2025-08-19

## 2025-08-19 RX ORDER — BUPIVACAINE HYDROCHLORIDE 2.5 MG/ML
4 INJECTION, SOLUTION EPIDURAL; INFILTRATION; INTRACAUDAL; PERINEURAL
Status: COMPLETED | OUTPATIENT
Start: 2025-08-19 | End: 2025-08-19

## 2025-08-19 RX ORDER — LIDOCAINE HYDROCHLORIDE 10 MG/ML
4 INJECTION, SOLUTION EPIDURAL; INFILTRATION; INTRACAUDAL; PERINEURAL
Status: COMPLETED | OUTPATIENT
Start: 2025-08-19 | End: 2025-08-19

## 2025-08-19 RX ADMIN — TRIAMCINOLONE ACETONIDE 80 MG: 40 INJECTION, SUSPENSION INTRA-ARTICULAR; INTRAMUSCULAR at 14:17

## 2025-08-19 RX ADMIN — LIDOCAINE HYDROCHLORIDE 4 ML: 10 INJECTION, SOLUTION EPIDURAL; INFILTRATION; INTRACAUDAL; PERINEURAL at 14:17

## 2025-08-19 RX ADMIN — BUPIVACAINE HYDROCHLORIDE 4 ML: 2.5 INJECTION, SOLUTION EPIDURAL; INFILTRATION; INTRACAUDAL; PERINEURAL at 14:17
